# Patient Record
Sex: FEMALE | Race: WHITE
[De-identification: names, ages, dates, MRNs, and addresses within clinical notes are randomized per-mention and may not be internally consistent; named-entity substitution may affect disease eponyms.]

---

## 2017-02-27 ENCOUNTER — HOSPITAL ENCOUNTER (INPATIENT)
Dept: HOSPITAL 36 - ER | Age: 72
LOS: 3 days | Discharge: NURSING, REHAB OR LONG-TERM CARE | DRG: 872 | End: 2017-03-02
Attending: INTERNAL MEDICINE | Admitting: INTERNAL MEDICINE
Payer: MEDICARE

## 2017-02-27 DIAGNOSIS — E86.0: ICD-10-CM

## 2017-02-27 DIAGNOSIS — F03.90: ICD-10-CM

## 2017-02-27 DIAGNOSIS — F29: ICD-10-CM

## 2017-02-27 DIAGNOSIS — A41.9: Primary | ICD-10-CM

## 2017-02-27 DIAGNOSIS — N39.0: ICD-10-CM

## 2017-02-27 DIAGNOSIS — R65.20: ICD-10-CM

## 2017-02-27 DIAGNOSIS — F20.9: ICD-10-CM

## 2017-02-27 DIAGNOSIS — R31.9: ICD-10-CM

## 2017-02-27 LAB
ALBUMIN/GLOB SERPL: 1 {RATIO} (ref 1–1.8)
ALP SERPL-CCNC: 64 U/L (ref 34–104)
ALT SERPL-CCNC: 5 U/L (ref 7–52)
ANION GAP SERPL CALC-SCNC: 6.5 MMOL/L (ref 7–16)
AST SERPL-CCNC: 16 U/L (ref 13–39)
BACTERIA #/AREA URNS HPF: (no result) /HPF
BASOPHILS NFR BLD AUTO: 0.2 % (ref 0–2)
BILIRUB SERPL-MCNC: 0.2 MG/DL (ref 0.3–1)
BILIRUB UR-MCNC: NEGATIVE MG/DL
BUN SERPL-MCNC: 26 MG/DL (ref 7–25)
BUN/CREAT SERPL: 28.9
CALCIUM SERPL-MCNC: 9.9 MG/DL (ref 8.6–10.3)
CHLORIDE SERPL-SCNC: 105 MEQ/L (ref 98–107)
CO2 SERPL-SCNC: 30.8 MEQ/L (ref 21–31)
COLOR UR: YELLOW
CREAT SERPL-MCNC: 0.9 MG/DL (ref 0.6–1.2)
EOSINOPHIL NFR BLD AUTO: 1.5 % (ref 0–5)
EPI CELLS URNS QL MICRO: (no result) /LPF
ERYTHROCYTE [DISTWIDTH] IN BLOOD BY AUTOMATED COUNT: 14 % (ref 11.5–20)
GLOBULIN SER-MCNC: 3.6 GM/DL
GLUCOSE SERPL-MCNC: 103 MG/DL (ref 70–105)
GLUCOSE UR STRIP-MCNC: NEGATIVE MG/DL
HCT VFR BLD CALC: 39.4 % (ref 35–45)
HGB BLD-MCNC: 13.1 GM/DL (ref 11.7–16.1)
INR PPP: 1.08 (ref 0.5–1.4)
KETONES UR STRIP-MCNC: NEGATIVE MG/DL
LYMPHOCYTES NFR BLD AUTO: 14.5 % (ref 20–50)
MCH RBC QN AUTO: 29.9 PG (ref 27–31)
MCHC RBC AUTO-ENTMCNC: 33.3 PG (ref 28–36)
MCV RBC AUTO: 89.8 FL (ref 81–100)
MONOCYTES NFR BLD AUTO: 8.9 % (ref 2–10)
NEUTROPHILS # BLD: 6.4 TH/CMM (ref 1.8–8)
NEUTROPHILS NFR BLD AUTO: 74.9 % (ref 40–80)
PH UR STRIP: 6 [PH]
PLATELET # BLD: 205 TH/CMM (ref 150–400)
PMV BLD AUTO: 10.2 FL
POTASSIUM SERPL-SCNC: 4.3 MEQ/L (ref 3.5–5.1)
PROT UR STRIP-MCNC: (no result) MG/DL
PROTHROMBIN TIME: 10.7 SECONDS (ref 9.5–11.5)
RBC # BLD AUTO: 4.38 MIL/CMM (ref 3.8–5.2)
RBC # UR STRIP: (no result) /UL
RBC #/AREA URNS HPF: (no result) /HPF (ref 0–5)
SODIUM SERPL-SCNC: 138 MEQ/L (ref 136–145)
UROBILINOGEN UR STRIP-ACNC: 0.2 E.U./DL (ref 0.2–1)
WBC # BLD AUTO: 8.5 TH/CMM (ref 4.8–10.8)
WBC #/AREA URNS HPF: (no result) /HPF (ref 0–5)

## 2017-02-27 PROCEDURE — Z7610: HCPCS

## 2017-02-27 RX ADMIN — DEXTROSE AND SODIUM CHLORIDE SCH MLS/HR: 5; .45 INJECTION, SOLUTION INTRAVENOUS at 21:57

## 2017-02-27 NOTE — ADMIT CRITERIA FORM
Admit Criteria Forms





- Admit Criteria


Diagnosis: 





                                  URINARY COMPLICATIONS





Clinical Indications for Inpatient Care 


                                                                    (Place 'X' 

for any and all applicable criteria):





Ongoing inpatient care may be indicated for urinary complications with ANY ONE 

of the following: 


[X]I.    Urinary tract infection requiring inpatient care as indicated by ANY 

ONE of the following(8)(19)(20):


         [X]a)  Severe symptoms (eg, high fever, severe pain)


         [ ]b)  Vomiting or dehydration requiring ongoing inpatient care


         [X]c)  IV antibiotic needs that cannot be managed at lower level of 

care 


         [ ]d)  Hemodynamic instability


         [ ]e)  Obstruction of collecting system by stone or tumor


[ ]II.   Urinary retention requiring drainage or surgery (3)(4)(5)(17)(18)


[ ]III.  Renal failure (Use Renal Failure  Criteria  for further information.)


[ ]IV. Oliguria(30)


[ ]V.  Post obstructive diuresis requiring close monitoring of urine output and 

intravenous compensation for excessive fluid losses(33)











Extended stay beyond goal length of stay for primary condition may be needed 

until ALL of the following are present(3)(4)(5)(8):


[ ]a)   Renal function (creatinine) at baseline, or daily decreases in 

creatinine consistent with renal function return


[ ]b)   Voiding adequately or with urinary catheter or percutaneous suprapubic 

tube and management regimen in place 


         that is performable at lower level of care.


[ ]c)   Urine output adequate


[ ]d)   Fever absent or resolving


[ ]e)   Infection absent or treatable at next level of care











The original [facility] content created by [employer] has been revised. 


The portions of the content which have been revised are identified through the 

use of italic text or in bold, and [facility] 


has neither reviewed nor approved the modified material. All other unmodified 

content is copyright  [facility]


     


Please see references footnoted in the original [facility] edition 

2016


Admit Criteria Met?: Yes

## 2017-02-28 VITALS — SYSTOLIC BLOOD PRESSURE: 161 MMHG | DIASTOLIC BLOOD PRESSURE: 73 MMHG

## 2017-02-28 LAB
ALBUMIN/GLOB SERPL: 1 {RATIO} (ref 1–1.8)
ALP SERPL-CCNC: 53 U/L (ref 34–104)
ALT SERPL-CCNC: 4 U/L (ref 7–52)
ANION GAP SERPL CALC-SCNC: 8.8 MMOL/L (ref 7–16)
AST SERPL-CCNC: 14 U/L (ref 13–39)
BILIRUB SERPL-MCNC: 0.3 MG/DL (ref 0.3–1)
BUN SERPL-MCNC: 23 MG/DL (ref 7–25)
BUN/CREAT SERPL: 28.8
CALCIUM SERPL-MCNC: 9 MG/DL (ref 8.6–10.3)
CHLORIDE SERPL-SCNC: 105 MEQ/L (ref 98–107)
CO2 SERPL-SCNC: 30.1 MEQ/L (ref 21–31)
CREAT SERPL-MCNC: 0.8 MG/DL (ref 0.6–1.2)
EOSINOPHIL NFR BLD: 3 % (ref 0–5)
ERYTHROCYTE [DISTWIDTH] IN BLOOD BY AUTOMATED COUNT: 13.5 % (ref 11.5–20)
GLOBULIN SER-MCNC: 3.2 GM/DL
GLUCOSE SERPL-MCNC: 92 MG/DL (ref 70–105)
HCT VFR BLD CALC: 35.8 % (ref 35–45)
HGB BLD-MCNC: 12.3 GM/DL (ref 11.7–16.1)
MCH RBC QN AUTO: 30.2 PG (ref 27–31)
MCHC RBC AUTO-ENTMCNC: 34.4 PG (ref 28–36)
MCV RBC AUTO: 87.7 FL (ref 81–100)
NEUTROPHILS NFR BLD AUTO: 72 % (ref 40–80)
NEUTS BAND NFR BLD: 2 % (ref 0–10)
PLAT MORPH BLD: (no result)
PLATELET # BLD EST: ADEQUATE 10*3/UL
PLATELET # BLD: 186 TH/CMM (ref 150–400)
PMV BLD AUTO: 10.7 FL
POTASSIUM SERPL-SCNC: 3.9 MEQ/L (ref 3.5–5.1)
RBC # BLD AUTO: 4.08 MIL/CMM (ref 3.8–5.2)
RBC MORPH BLD: NORMAL
SODIUM SERPL-SCNC: 140 MEQ/L (ref 136–145)
TOTAL CELLS COUNTED BLD: 100
WBC # BLD AUTO: 7.4 TH/CMM (ref 4.8–10.8)

## 2017-02-28 RX ADMIN — SODIUM CHLORIDE SCH MLS/HR: 9 INJECTION, SOLUTION INTRAVENOUS at 17:05

## 2017-02-28 RX ADMIN — SULFAMETHOXAZOLE AND TRIMETHOPRIM SCH TAB: 800; 160 TABLET ORAL at 17:01

## 2017-02-28 RX ADMIN — DEXTROSE AND SODIUM CHLORIDE SCH MLS/HR: 5; .45 INJECTION, SOLUTION INTRAVENOUS at 11:09

## 2017-02-28 RX ADMIN — SULFAMETHOXAZOLE AND TRIMETHOPRIM SCH: 800; 160 TABLET ORAL at 14:07

## 2017-03-01 RX ADMIN — ANORECTAL OINTMENT SCH APPL: 15.7; .44; 24; 20.6 OINTMENT TOPICAL at 16:33

## 2017-03-01 RX ADMIN — SULFAMETHOXAZOLE AND TRIMETHOPRIM SCH TAB: 800; 160 TABLET ORAL at 16:33

## 2017-03-01 RX ADMIN — SULFAMETHOXAZOLE AND TRIMETHOPRIM SCH TAB: 800; 160 TABLET ORAL at 09:20

## 2017-03-01 RX ADMIN — SODIUM CHLORIDE SCH MLS/HR: 9 INJECTION, SOLUTION INTRAVENOUS at 16:39

## 2017-03-01 RX ADMIN — ANORECTAL OINTMENT SCH APPL: 15.7; .44; 24; 20.6 OINTMENT TOPICAL at 09:21

## 2017-03-01 RX ADMIN — DEXTROSE AND SODIUM CHLORIDE SCH MLS/HR: 5; .45 INJECTION, SOLUTION INTRAVENOUS at 19:51

## 2017-03-02 ENCOUNTER — HOSPITAL ENCOUNTER (INPATIENT)
Dept: HOSPITAL 36 - GERO | Age: 72
LOS: 8 days | Discharge: NURSING, REHAB OR LONG-TERM CARE | DRG: 885 | End: 2017-03-10
Attending: PSYCHIATRY & NEUROLOGY | Admitting: PSYCHIATRY & NEUROLOGY
Payer: MEDICARE

## 2017-03-02 VITALS — SYSTOLIC BLOOD PRESSURE: 128 MMHG | DIASTOLIC BLOOD PRESSURE: 70 MMHG

## 2017-03-02 DIAGNOSIS — F03.90: ICD-10-CM

## 2017-03-02 DIAGNOSIS — E44.1: ICD-10-CM

## 2017-03-02 DIAGNOSIS — I10: ICD-10-CM

## 2017-03-02 DIAGNOSIS — Z91.14: ICD-10-CM

## 2017-03-02 DIAGNOSIS — J45.909: ICD-10-CM

## 2017-03-02 DIAGNOSIS — N39.0: ICD-10-CM

## 2017-03-02 DIAGNOSIS — I25.10: ICD-10-CM

## 2017-03-02 DIAGNOSIS — M19.90: ICD-10-CM

## 2017-03-02 DIAGNOSIS — E66.9: ICD-10-CM

## 2017-03-02 DIAGNOSIS — G82.20: ICD-10-CM

## 2017-03-02 DIAGNOSIS — F29: Primary | ICD-10-CM

## 2017-03-02 DIAGNOSIS — E78.5: ICD-10-CM

## 2017-03-02 DIAGNOSIS — E03.9: ICD-10-CM

## 2017-03-02 DIAGNOSIS — F31.9: ICD-10-CM

## 2017-03-02 DIAGNOSIS — F20.9: ICD-10-CM

## 2017-03-02 LAB
ALBUMIN/GLOB SERPL: 0.9 {RATIO} (ref 1–1.8)
ALP SERPL-CCNC: 60 U/L (ref 34–104)
ALT SERPL-CCNC: 6 U/L (ref 7–52)
ANION GAP SERPL CALC-SCNC: 8.5 MMOL/L (ref 7–16)
AST SERPL-CCNC: 17 U/L (ref 13–39)
BILIRUB SERPL-MCNC: 0.3 MG/DL (ref 0.3–1)
BUN SERPL-MCNC: 14 MG/DL (ref 7–25)
BUN/CREAT SERPL: 12.7
CALCIUM SERPL-MCNC: 9.6 MG/DL (ref 8.6–10.3)
CHLORIDE SERPL-SCNC: 106 MEQ/L (ref 98–107)
CO2 SERPL-SCNC: 30.7 MEQ/L (ref 21–31)
CREAT SERPL-MCNC: 1.1 MG/DL (ref 0.6–1.2)
EOSINOPHIL NFR BLD: 3 % (ref 0–5)
ERYTHROCYTE [DISTWIDTH] IN BLOOD BY AUTOMATED COUNT: 13.3 % (ref 11.5–20)
GLOBULIN SER-MCNC: 3.7 GM/DL
GLUCOSE SERPL-MCNC: 95 MG/DL (ref 70–105)
HCT VFR BLD CALC: 38.4 % (ref 35–45)
HGB BLD-MCNC: 12.8 GM/DL (ref 11.7–16.1)
MCH RBC QN AUTO: 29.7 PG (ref 27–31)
MCHC RBC AUTO-ENTMCNC: 33.4 PG (ref 28–36)
MCV RBC AUTO: 89.1 FL (ref 81–100)
NEUTROPHILS NFR BLD AUTO: 70 % (ref 40–80)
NEUTS BAND NFR BLD: 2 % (ref 0–10)
PLAT MORPH BLD: NORMAL
PLATELET # BLD EST: ADEQUATE 10*3/UL
PLATELET # BLD: 209 TH/CMM (ref 150–400)
PMV BLD AUTO: 10.5 FL
POTASSIUM SERPL-SCNC: 4.2 MEQ/L (ref 3.5–5.1)
RBC # BLD AUTO: 4.31 MIL/CMM (ref 3.8–5.2)
RBC MORPH BLD: NORMAL
SODIUM SERPL-SCNC: 141 MEQ/L (ref 136–145)
TOTAL CELLS COUNTED BLD: 100
WBC # BLD AUTO: 7.5 TH/CMM (ref 4.8–10.8)

## 2017-03-02 PROCEDURE — Z7610: HCPCS

## 2017-03-02 PROCEDURE — X3401: HCPCS

## 2017-03-02 RX ADMIN — ALBUTEROL SULFATE SCH MG: 2 SYRUP ORAL at 18:00

## 2017-03-02 RX ADMIN — ANORECTAL OINTMENT SCH APPL: 15.7; .44; 24; 20.6 OINTMENT TOPICAL at 18:09

## 2017-03-02 RX ADMIN — SULFAMETHOXAZOLE AND TRIMETHOPRIM SCH TAB: 800; 160 TABLET ORAL at 18:01

## 2017-03-02 RX ADMIN — SULFAMETHOXAZOLE AND TRIMETHOPRIM SCH TAB: 800; 160 TABLET ORAL at 08:26

## 2017-03-02 RX ADMIN — ANORECTAL OINTMENT SCH APPL: 15.7; .44; 24; 20.6 OINTMENT TOPICAL at 08:26

## 2017-03-02 NOTE — PROGRESS NOTES
SUBJECTIVE:  Chart was reviewed and the patient interviewed.  Also, discussed

the patient's condition with the staff and reviewed records and labs.  The

patient is still anxious, but mood is not depressed ____.  The patient also is

cooperative with her treatment and compliant with taking care of medications. 

The patient also denies any intention to harm herself or others.  She still

seems to be responding to the stimuli, but no behavioral issues.



ASSESSMENT:  The patient is still psychotic, but no major behavioral issues.



TREATMENT PLAN:  Continue to monitor her behavior and her condition closely. 

Also, continue Haldol on a p.r.n. basis.  Also, continue Depakote and we will

get a Depakote blood level.  Also, Risperdal 1 mg twice a day to continue. 

Also, Depakote blood level that was done upon admission came back to be 78.8

which is within therapeutic level and we will continue same dose.  Also,

continue supportive therapy.





DD: 03/01/2017 09:44

DT: 03/02/2017 00:03

JOB# [id number]  [id number]

## 2017-03-03 RX ADMIN — ANORECTAL OINTMENT SCH APPL: 15.7; .44; 24; 20.6 OINTMENT TOPICAL at 09:22

## 2017-03-03 RX ADMIN — ALBUTEROL SULFATE SCH MG: 2 SYRUP ORAL at 09:23

## 2017-03-03 RX ADMIN — SULFAMETHOXAZOLE AND TRIMETHOPRIM SCH TAB: 800; 160 TABLET ORAL at 17:33

## 2017-03-03 RX ADMIN — ALBUTEROL SULFATE SCH MG: 2 SYRUP ORAL at 17:33

## 2017-03-03 RX ADMIN — SULFAMETHOXAZOLE AND TRIMETHOPRIM SCH TAB: 800; 160 TABLET ORAL at 09:22

## 2017-03-03 RX ADMIN — ANORECTAL OINTMENT SCH APPL: 15.7; .44; 24; 20.6 OINTMENT TOPICAL at 17:33

## 2017-03-03 NOTE — HISTORY & PHYSICAL
This is a cross coverage for Dr. Torres.  I did see the patient __3-2-17__ was 
waiting

for Dr. Torres to dictate the H and P, but apparently it was not done.



CHIEF COMPLAINT:  Increasing agitation, ALOC.



HISTORY OF PRESENT ILLNESS:  This is a 71 years old female with history of

dementia, hypothyroidism, obesity, asthma and DJD, was admitted from nursing

facility secondary to change in mental status.  The patient was evaluated by Dr. Cardoza in the ER and was diagnosed with urinary tract infection.



PAST MEDICAL HISTORY:  As mentioned in history of present illness.



PAST SURGICAL HISTORY:  No recent surgeries.



ALLERGIES:  No known drug allergies.



MEDICATIONS:  Albuterol, ascorbic acid, Plavix, digoxin, Haldol, midodrine,

____, Bactrim and ____.



FAMILY HISTORY:  Noncontributory.



SOCIAL HISTORY:  The patient lives in a nursing home.  The patient requiring

24-hour total care.



REVIEW OF SYSTEMS:  This is limited secondary to the patient's current mental

state.  We will try to obtain a more detailed review of systems ____ family

members.  There is a brother ____ Gregoria, number is [phone].  The

patient's regular physician is Dr. Royal Torres, [phone].  We also got

some information from Dr. Lopez who has followed the patient at ____ Nursing

Facility.



PHYSICAL EXAMINATION:

VITAL SIGNS:  Blood pressure 93/50, respirations 18, pulse 89 and temperature is

97.5.

GENERAL:  Elderly female, appears her stated age.

NECK:  Supple.  No mass.

LUNGS:  Equal breath sounds, few rhonchi.

HEART:  Regular rate and rhythm without appreciable murmurs.

ABDOMEN:  Soft, nontender and globular.

EXTREMITIES:  Positive excoriation.

NEUROLOGIC:  limited.



LABORATORY DATA:  WBC 7.5, hemoglobin 12.8 and platelets 209.  Sodium 141 and

potassium 4.2.  BUN 14 and creatinine 1.1.  Albumin 3.4.   wbc's.



ASSESSMENT AND PLAN:  UTI, obesity, dementia, hypothyroidism and mild

protein-calorie malnutrition.  We will continue the patient on IV antibiotic. 

Psychiatry has been consulted.  Continue IV hydration.  We will review the

patient's medication.  We will continue and follow the patient closely.





DD: 03/03/2017 16:30

DT: 03/03/2017 19:32

[facility]# [id number]  [id number]

MTDRY

## 2017-03-03 NOTE — PSYCHOSOCIAL EVALUATION
PATIENT'S AGE:  71.



SEX:  Female.



CHIEF COMPLAINT:  Agitation and irritability.



HISTORY OF PRESENT ILLNESS:  The patient is a 71-year-old female who was

transferred from Med/Surg Unit because of agitation and irritability.  The

patient has been extremely agitated and irritable and pulling IVs and tried to

hit staff with yelling and screaming episodes and has been uncooperative with

her treatment.  The patient also has been confused and has not been able to make

any decisions in regard to her treatment.  The patient was transferred from

Med/Surg Unit in order to adjust her psychotropic medications.



The patient is still agitated and irritable.  The patient also still has

episodes of yelling and screaming and she is unable to follow any directions and

she seems to be confused.  She also needs complete help with her ADLs.



PAST PSYCHIATRIC HISTORY:  The patient has history of dementia and history of

psychosis.



PAST MEDICAL HISTORY:  As per Dr. Martínez.



SOCIAL HISTORY:  No known alcohol or street drug use.



MENTAL STATUS EXAMINATION:  The patient appears older than her stated age. 

Irritable mood.  Disheveled.  Gets angry and agitated easily.  Thought processes

are with poverty of speech and episodes of yelling during interview.  The

patient did not answer questions regarding hallucinations or delusions, but

actively responding to internal stimuli.  The patient did not answer questions

regarding suicide or homicide.  The patient is alert, but seems to be

disoriented to time, place, person and situation.  Impaired immediate, recent

and remote memories and she did not know even her birth date.  Poor insight

because of her confusion.  Poor judgment and she is yelling and trying to hit

staff.  Poor attention and concentration.



ASSESSMENT:

PRIMARY DIAGNOSIS:  Unspecified psychosis.



SECONDARY DIAGNOSIS:  Dementia, moderate-to-severe, with psychotic features.



TREATMENT PLAN:  We will monitor her behavior and her condition closely.  We

will start individual as well as milieu psychotherapy.  Also, we will monitor

and adjust the psychotropic medications.



ESTIMATED LENGTH OF STAY:  7-10 days.



PATIENT's STRENGTHS AND WEAKNESSES:  The patient's strengths are not clear at

this time except that she might have supportive family.  Weaknesses are her poor

impulse control and confusion.



AFTER DISCHARGE PLAN:  The patient might need placement and outpatient treatment

and followup will continue as an outpatient.



CRITERIA FOR DISCHARGE:  The patient will not be psychotic and will stabilize on

psychotropic medications and will establish treatment plans.





DD: 03/03/2017 06:16

DT: 03/03/2017 09:03

[facility]# [id number]  [id number]

## 2017-03-03 NOTE — INTERNAL MEDICINE PROG NOTE
Internal Medicine Subjective





- Subjective


Service Date: 03/03/17 (awake, agitated nad)


Patient seen and examined:: with staff


Patient is:: awake


Per staff patient is:: no adverse event





Internal Medicine Objective





- Physical Exam


Vitals and I&O: 


 Vital Signs











Temp  97.5 F   03/03/17 06:57


 


Pulse  89   03/03/17 09:24


 


Resp  18   03/03/17 06:57


 


BP  93/53   03/03/17 06:57


 


Pulse Ox  97   03/03/17 06:57








 Intake & Output











 03/02/17 03/03/17 03/03/17





 18:59 06:59 18:59


 


Intake Total 300  


 


Output Total 100  


 


Balance 200  


 


Intake:   


 


  Oral 300  


 


Output:   


 


  Urine 100  


 


Other:   


 


  # Voids  2 











Active Medications: 


Current Medications





Albuterol Sulfate (Ventolin)  2 mg PO BID NOREEN


   Stop: 05/01/17 16:59


   Last Admin: 03/03/17 09:23 Dose:  2 mg


Ascorbic Acid (Vitamin C)  500 mg PO DAILY NOREEN


   Stop: 05/02/17 08:59


   Last Admin: 03/03/17 09:21 Dose:  500 mg


Betamethasone Dipropionate (Betamethasone Dip 0.05% Cream)  1 appl TP DAILY NOREEN


   Stop: 05/02/17 08:59


   Last Admin: 03/03/17 09:23 Dose:  1 appl


Calamine/Phenol (Calmoseptine)  1 appl TP BID NOREEN


   Stop: 05/01/17 16:59


   Last Admin: 03/03/17 09:22 Dose:  1 appl


Ciprofloxacin (Cipro)  500 mg PO BID NOREEN


   Stop: 05/01/17 16:59


   Last Admin: 03/03/17 09:23 Dose:  500 mg


Ciprofloxacin (Cipro 0.3% Ophth Soln)  1 drop EACH EYE BID NOREEN


   Stop: 05/01/17 16:59


   Last Admin: 03/03/17 09:19 Dose:  1 drop


Clopidogrel Bisulfate (Plavix)  75 mg PO DAILY NOREEN


   Stop: 05/02/17 08:59


   Last Admin: 03/03/17 09:21 Dose:  75 mg


Clotrimazole (Lotrimin 1% Cream)  1 appl TP DAILY NOREEN


   Stop: 05/02/17 08:59


   Last Admin: 03/03/17 09:23 Dose:  1 appl


Digoxin (Lanoxin)  0.125 mg PO DAILY NOREEN


   Stop: 05/02/17 08:59


   Last Admin: 03/03/17 09:24 Dose:  Not Given


Divalproex Sodium (Depakote Er)  500 mg PO BID@0900,2100 NOREEN


   PRN Reason: Protocol


   Stop: 05/01/17 20:59


   Last Admin: 03/03/17 09:22 Dose:  500 mg


Donepezil HCl (Aricept)  5 mg PO HS NOREEN


   Stop: 05/01/17 20:59


   Last Admin: 03/02/17 21:06 Dose:  5 mg


Haloperidol (Haldol)  1 mg PO BID PRN; Protocol


   PRN Reason: Agitation


   Stop: 05/01/17 15:48


   Last Admin: 03/03/17 14:47 Dose:  1 mg


Lorazepam (Ativan)  0.5 mg PO Q4H PRN


   PRN Reason: Anxiety


   Stop: 05/02/17 15:11


   Last Admin: 03/03/17 15:25 Dose:  0.5 mg


Midodrine (Proamatine)  5 mg PO Q12HR NOREEN


   Stop: 05/01/17 20:59


   Last Admin: 03/03/17 09:19 Dose:  5 mg


Multivitamins/Vitamin C (Theragran)  1 tab PO DAILY NOREEN


   Stop: 05/02/17 08:59


   Last Admin: 03/03/17 09:22 Dose:  1 tab


Promethazine HCl/Dextromethorphan (Phenergan Dm 6.25/15mg-5 Ml)  5 ml PO TID PRN


   PRN Reason: CONGESTION/COUGH


   Stop: 05/01/17 15:53


Quetiapine Fumarate (Seroquel)  12.5 mg PO TID NOREEN


   PRN Reason: Protocol


   Stop: 05/01/17 20:59


   Last Admin: 03/03/17 14:48 Dose:  12.5 mg


Quetiapine Fumarate (Seroquel)  50 mg PO HS NOREEN


   PRN Reason: Protocol


   Stop: 05/01/17 20:59


   Last Admin: 03/02/17 21:05 Dose:  50 mg


Trimethoprim/Sulfamethoxazole (Bactrim Ds)  1 tab PO BID NOREEN


   Stop: 05/01/17 16:59


   Last Admin: 03/03/17 09:22 Dose:  1 tab


Zinc Sulfate (Zinc Sulfate)  220 mg PO DAILY NOREEN


   Stop: 05/02/17 08:59


   Last Admin: 03/03/17 09:23 Dose:  220 mg








General: alert


HEENT: NC/AT, PERRLA


Neck: Supple


Lungs: CTAB


Cardiovascular: RRR, Normal S1, Normal S2, without murmur


Abdomen: soft non-tender, non-distended


Extremities: clear





- Procedures


Procedures: 


 Procedures











Procedure Code Date


 


CARDIOPULM RESUSCITA NOS 99.60 10/19/07


 


CL REDUC DISLOC-HAND/FNG 79.74 08/29/07


 


CLOSURE SKIN & SUBCUTANEOUS NEC 86.59 09/10/12


 


CONTINUOUS INVASIVE MECHANICAL VENTILATION <96 CONSEC HRS 96.71 10/19/07


 


DESTRUCT EXT EAR LES NEC 18.29 06/27/01


 


DIPHTHERIA TOXOID ADMIN 99.36 02/03/12


 


EGD BIOPSY SINGLE/MULTIPLE 33809 06/16/10


 


EGD CONTROL BLEEDING ANY 42556 09/03/06


 


ELECTROCARDIOGRAM 89.52 06/27/01


 


ELECTROCARDIOGRAM COMPLETE 94077 06/27/01


 


EMERGENCY DEPT VISIT 48815 02/23/12


 


EMERGENCY DEPT VISIT 65305 02/03/12


 


ENDOSCOP EXC OR DEST OF LESION OR TISSUE OF ESOPHA 42.33 09/03/06


 


ESOPHAGOGASTRODUODENOSCOPY [EGD] W/CLOSED BIOPSY 45.16 06/16/10


 


EXC FACE-MM B9+ALBERTO 2.1-3 CM 38405 06/27/01


 


FREEING OF BOWEL ADHESION 96804 06/19/08


 


HEART/LUNG RESUSCITATION CPR 51455 10/19/07


 


HERNIA REPAIR W/MESH 95038 02/23/12


 


IMMUNIZATION ADMIN 70126 02/03/12


 


INJECT/INFUSE NEC 99.29 04/18/13


 


INSERT EMERGENCY AIRWAY 17909 10/19/07


 


INSERT ENDOTRACHEAL TUBE 96.04 10/19/07


 


INSERT PICC CATH 05664 05/28/16


 


INSERTION OF INFUSION DEV INTO R SUBCLAV VEIN, PERC APPROACH [id number] 05/28/16


 


OP RED-INT FIX TIB/FIBUL 79.36 11/24/98


 


OTH LYSIS-PERITONEAL ADHES 54.59 02/23/12


 


OTHER C.A.T. SCAN 88.38 11/03/98


 


OTHER OPEN INCISIONAL HERNIA REPAIR WITH GRAFT OR PROSTHESIS 53.61 02/23/12


 


PART SM BOWEL RESECT NEC 45.62 06/19/08


 


PERMANENT ILEOSTOMY NEC 46.23 10/19/07


 


RADIOGRAPHIC PROCEDURE 14245 11/03/98


 


REMOVAL OF COLON 76189 10/19/07


 


REMOVAL OF SPLEEN TOTAL 80770 10/19/07


 


REPAIR BOWEL OPENING 80376 06/19/08


 


RPR S/N/AX/GEN/TRNK 2.5CM/< 46584 02/23/12


 


RPR S/N/AX/GEN/TRNK2.6-7.5CM 08991 09/10/12


 


RPR VENTRAL ALLISON INIT REDUC 03624 02/23/12


 


SM BOWEL STOMA CLOSURE 46.51 06/19/08


 


TD VACCINE > 7 IM 35680 02/03/12


 


TETANUS TOXOID ADMINIST 99.38 02/03/12


 


THER/PROPH/DIAG INJ IV PUSH 24312 04/18/13


 


TOT INTRA-ABD COLECTOMY 45.8 10/19/07


 


TOTAL SPLENECTOMY 41.5 10/19/07


 


TREAT FINGER DISLOCATION 82344 08/29/07


 


TREATMENT OF ANKLE FRACTURE 78477 11/24/98


 


ULTRASONOGRAPHY OF RIGHT SUBCLAVIAN VEIN, GUIDANCE [id number] 05/28/16


 


VENOUS CATHETERIZATION NEC 38.93 09/03/06














Internal Medicine Assmt/Plan





- Assessment


Assessment: 





paraplegia


bipolar with schizophrenia


artherosclerotic heart disease


htn


hyperlipidemia








- Plan


Plan: 





swallow eval to be done


aspiration precautions


continue snf meds


cpm

## 2017-03-04 RX ADMIN — ALBUTEROL SULFATE SCH MG: 2 SYRUP ORAL at 17:19

## 2017-03-04 RX ADMIN — SULFAMETHOXAZOLE AND TRIMETHOPRIM SCH TAB: 800; 160 TABLET ORAL at 17:19

## 2017-03-04 RX ADMIN — ALBUTEROL SULFATE SCH MG: 2 SYRUP ORAL at 10:00

## 2017-03-04 RX ADMIN — ANORECTAL OINTMENT SCH APPL: 15.7; .44; 24; 20.6 OINTMENT TOPICAL at 10:00

## 2017-03-04 RX ADMIN — SULFAMETHOXAZOLE AND TRIMETHOPRIM SCH TAB: 800; 160 TABLET ORAL at 10:00

## 2017-03-04 RX ADMIN — ANORECTAL OINTMENT SCH APPL: 15.7; .44; 24; 20.6 OINTMENT TOPICAL at 17:19

## 2017-03-04 NOTE — INTERNAL MEDICINE PROG NOTE
Internal Medicine Subjective





- Subjective


Patient seen and examined:: with staff, chart reviewed


Patient is:: awake, interactive


Per staff patient is:: no adverse event, agitated, combative, confused





Internal Medicine Objective





- Physical Exam


Vitals and I&O: 


 Vital Signs











Temp  97.9 F   03/04/17 15:51


 


Pulse  101   03/04/17 15:51


 


Resp  22   03/04/17 15:51


 


BP  113/67   03/04/17 15:51


 


Pulse Ox  98   03/04/17 15:51








 Intake & Output











 03/03/17 03/04/17 03/04/17





 18:59 06:59 18:59


 


Intake Total 1000  


 


Output Total 460  


 


Balance 540  


 


Intake:   


 


  Oral 1000  


 


Output:   


 


  Urine 460  


 


Other:   


 


  # Voids   2


 


  # Bowel Movements 2  











Active Medications: 


Current Medications





Albuterol Sulfate (Ventolin)  2 mg PO BID NOREEN


   Stop: 05/01/17 16:59


   Last Admin: 03/04/17 10:00 Dose:  2 mg


Ascorbic Acid (Vitamin C)  500 mg PO DAILY NOREEN


   Stop: 05/02/17 08:59


   Last Admin: 03/04/17 10:00 Dose:  500 mg


Betamethasone Dipropionate (Betamethasone Dip 0.05% Cream)  1 appl TP DAILY NOREEN


   Stop: 05/02/17 08:59


   Last Admin: 03/04/17 10:00 Dose:  1 appl


Calamine/Phenol (Calmoseptine)  1 appl TP BID NOREEN


   Stop: 05/01/17 16:59


   Last Admin: 03/04/17 10:00 Dose:  1 appl


Ciprofloxacin (Cipro 0.3% Ophth Soln)  1 drop EACH EYE BID NOREEN


   Stop: 05/01/17 16:59


   Last Admin: 03/04/17 10:00 Dose:  1 drop


Clopidogrel Bisulfate (Plavix)  75 mg PO DAILY NOREEN


   Stop: 05/02/17 08:59


   Last Admin: 03/04/17 10:00 Dose:  75 mg


Clotrimazole (Lotrimin 1% Cream)  1 appl TP DAILY NOREEN


   Stop: 05/02/17 08:59


   Last Admin: 03/04/17 10:00 Dose:  1 appl


Digoxin (Lanoxin)  0.125 mg PO DAILY NOREEN


   Stop: 05/02/17 08:59


   Last Admin: 03/04/17 10:13 Dose:  Not Given


Divalproex Sodium (Depakote Er)  500 mg PO BID@0900,2100 NOREEN


   PRN Reason: Protocol


   Stop: 05/01/17 20:59


   Last Admin: 03/04/17 10:00 Dose:  500 mg


Donepezil HCl (Aricept)  5 mg PO HS NOREEN


   Stop: 05/01/17 20:59


   Last Admin: 03/03/17 21:10 Dose:  5 mg


Haloperidol (Haldol)  1 mg PO BID PRN; Protocol


   PRN Reason: Agitation


   Stop: 05/01/17 15:48


   Last Admin: 03/03/17 14:47 Dose:  1 mg


Lorazepam (Ativan)  0.5 mg PO Q4H PRN


   PRN Reason: Anxiety


   Stop: 05/02/17 15:11


   Last Admin: 03/04/17 10:21 Dose:  0.5 mg


Midodrine (Proamatine)  5 mg PO Q12HR NOREEN


   Stop: 05/01/17 20:59


   Last Admin: 03/04/17 10:00 Dose:  5 mg


Multivitamins/Vitamin C (Theragran)  1 tab PO DAILY NOREEN


   Stop: 05/02/17 08:59


   Last Admin: 03/04/17 10:00 Dose:  1 tab


Promethazine HCl/Dextromethorphan (Phenergan Dm 6.25/15mg-5 Ml)  5 ml PO TID PRN


   PRN Reason: CONGESTION/COUGH


   Stop: 05/01/17 15:53


Quetiapine Fumarate (Seroquel)  12.5 mg PO TID NOREEN


   PRN Reason: Protocol


   Stop: 05/01/17 20:59


   Last Admin: 03/04/17 13:20 Dose:  12.5 mg


Quetiapine Fumarate (Seroquel)  50 mg PO HS [facility]


   PRN Reason: Protocol


   Stop: 05/01/17 20:59


   Last Admin: 03/03/17 21:11 Dose:  50 mg


Trimethoprim/Sulfamethoxazole (Bactrim Ds)  1 tab PO BID [facility]


   Stop: 05/01/17 16:59


   Last Admin: 03/04/17 10:00 Dose:  1 tab


Zinc Sulfate (Zinc Sulfate)  220 mg PO DAILY [facility]


   Stop: 05/02/17 08:59


   Last Admin: 03/04/17 10:00 Dose:  220 mg








General: lethargic, demented


HEENT: NC/AT, PERRLA


Neck: Supple, No JVD


Lungs: CTAB


Cardiovascular: RRR, Normal S1, Normal S2


Abdomen: soft non-tender


Extremities: excoriation


Neurological: no change, disorganized, unsteady, unable to follow command





- Procedures


Procedures: 


 Procedures











Procedure Code Date


 


CARDIOPULM RESUSCITA NOS 99.60 10/19/07


 


CL REDUC DISLOC-HAND/FNG 79.74 08/29/07


 


CLOSURE SKIN & SUBCUTANEOUS NEC 86.59 09/10/12


 


CONTINUOUS INVASIVE MECHANICAL VENTILATION <96 CONSEC HRS 96.71 10/19/07


 


DESTRUCT EXT EAR LES NEC 18.29 06/27/01


 


DIPHTHERIA TOXOID ADMIN 99.36 02/03/12


 


EGD BIOPSY SINGLE/MULTIPLE 82954 06/16/10


 


EGD CONTROL BLEEDING ANY 71862 09/03/06


 


ELECTROCARDIOGRAM 89.52 06/27/01


 


ELECTROCARDIOGRAM COMPLETE 65626 06/27/01


 


EMERGENCY DEPT VISIT 96881 02/23/12


 


EMERGENCY DEPT VISIT 01634 02/03/12


 


ENDOSCOP EXC OR DEST OF LESION OR TISSUE OF ESOPHA 42.33 09/03/06


 


ESOPHAGOGASTRODUODENOSCOPY [EGD] W/CLOSED BIOPSY 45.16 06/16/10


 


EXC FACE-MM B9+ALBERTO 2.1-3 CM 74748 06/27/01


 


FREEING OF BOWEL ADHESION 61340 06/19/08


 


HEART/LUNG RESUSCITATION CPR 13760 10/19/07


 


HERNIA REPAIR W/MESH 89646 02/23/12


 


IMMUNIZATION ADMIN 75979 02/03/12


 


INJECT/INFUSE NEC 99.29 04/18/13


 


INSERT EMERGENCY AIRWAY 34740 10/19/07


 


INSERT ENDOTRACHEAL TUBE 96.04 10/19/07


 


INSERT PICC CATH 65727 05/28/16


 


INSERTION OF INFUSION DEV INTO R SUBCLAV VEIN, PERC APPROACH [id number] 05/28/16


 


OP RED-INT FIX TIB/FIBUL 79.36 11/24/98


 


OTH LYSIS-PERITONEAL ADHES 54.59 02/23/12


 


OTHER C.A.T. SCAN 88.38 11/03/98


 


OTHER OPEN INCISIONAL HERNIA REPAIR WITH GRAFT OR PROSTHESIS 53.61 02/23/12


 


PART SM BOWEL RESECT NEC 45.62 06/19/08


 


PERMANENT ILEOSTOMY NEC 46.23 10/19/07


 


RADIOGRAPHIC PROCEDURE 14062 11/03/98


 


REMOVAL OF COLON 41329 10/19/07


 


REMOVAL OF SPLEEN TOTAL 42718 10/19/07


 


REPAIR BOWEL OPENING 15578 06/19/08


 


RPR S/N/AX/GEN/TRNK 2.5CM/< 98268 02/23/12


 


RPR S/N/AX/GEN/TRNK2.6-7.5CM 66052 09/10/12


 


RPR VENTRAL ALLISON INIT REDUC 55046 02/23/12


 


SM BOWEL STOMA CLOSURE 46.51 06/19/08


 


TD VACCINE > 7 IM 44446 02/03/12


 


TETANUS TOXOID ADMINIST 99.38 02/03/12


 


THER/PROPH/DIAG INJ IV PUSH 84392 04/18/13


 


TOT INTRA-ABD COLECTOMY 45.8 10/19/07


 


TOTAL SPLENECTOMY 41.5 10/19/07


 


TREAT FINGER DISLOCATION 85493 08/29/07


 


TREATMENT OF ANKLE FRACTURE 19883 11/24/98


 


ULTRASONOGRAPHY OF RIGHT SUBCLAVIAN VEIN, GUIDANCE [id number] 05/28/16


 


VENOUS CATHETERIZATION NEC 38.93 09/03/06














Internal Medicine Assmt/Plan





- Assessment


Assessment: 





paraplegia


bipolar with schizophrenia


artherosclerotic heart disease


htn


hyperlipidemia


uti





- Plan


Plan: 





cont on bp meds 


cont on po abx


aspiration precaution 


cont on low na diet 


kellie rn

## 2017-03-04 NOTE — PROGRESS NOTES
SUBJECTIVE:  The patient was seen, chart reviewed, and discussed with staff. 

The patient is currently in the hospital due to agitation and irritability. 

Trying to hit staff, uncooperative.  When I ask the patient any question, she

tells me "shut up."  She is refusing interview.  She is angry, irritable, and

appears confused, flat affect.



ASSESSMENT:  The patient is refusing interview, noted to be agitated, irritable,

poor impulse control, seems to be mumbling, history of dementia, and appears

confused and disoriented.



PLAN:  We will try to reestablish ____ at a later time.  We will continue to

monitor closely and make appropriate medication adjustments.  Given the current

symptoms and the severity of the events that brought her to the hospital, the

patient is not safe for a lower level of care.





DD: 03/04/2017 06:36

DT: 03/04/2017 20:30

JOB# [id number]  [id number]

## 2017-03-05 RX ADMIN — ANORECTAL OINTMENT SCH APPL: 15.7; .44; 24; 20.6 OINTMENT TOPICAL at 09:55

## 2017-03-05 RX ADMIN — ANORECTAL OINTMENT SCH APPL: 15.7; .44; 24; 20.6 OINTMENT TOPICAL at 17:02

## 2017-03-05 RX ADMIN — SULFAMETHOXAZOLE AND TRIMETHOPRIM SCH TAB: 800; 160 TABLET ORAL at 09:55

## 2017-03-05 RX ADMIN — SULFAMETHOXAZOLE AND TRIMETHOPRIM SCH TAB: 800; 160 TABLET ORAL at 17:02

## 2017-03-05 RX ADMIN — ALBUTEROL SULFATE SCH MG: 2 SYRUP ORAL at 09:55

## 2017-03-05 RX ADMIN — ALBUTEROL SULFATE SCH MG: 2 SYRUP ORAL at 17:02

## 2017-03-05 NOTE — PROGRESS NOTES
SUBJECTIVE:  The patient seen, chart reviewed, discussed with staff.  The

patient is currently in the hospital with agitation, irritability.  The patient

is refusing interview, not talking to me at all, anger, irritable, confused,

with flat affect, still aggressive, trying to hit staff, not safe for a lower

level of care.



ASSESSMENT:  The patient refusing interview, agitated, irritable, poor impulse

control, mumbling to herself, still aggressive.



PLAN:  We will continue to monitor and titrate medications.  The patient is not

safe for a lower level of care at this time.





DD: 03/05/2017 08:26

DT: 03/05/2017 18:42

JOB# [id number]  [id number]

## 2017-03-05 NOTE — INTERNAL MEDICINE PROG NOTE
Internal Medicine Subjective





- Subjective


Patient seen and examined:: with staff, chart reviewed


Patient is:: verbal, non-interactive


Per staff patient is:: agitated, noncompliant, confused





Internal Medicine Objective





- Physical Exam


Vitals and I&O: 


 Vital Signs











Temp  97.9 F   03/04/17 15:51


 


Pulse  81   03/05/17 09:55


 


Resp  22   03/04/17 15:51


 


BP  113/67   03/04/17 15:51


 


Pulse Ox  98   03/04/17 15:51








 Intake & Output











 03/04/17 03/05/17 03/05/17





 18:59 06:59 18:59


 


Intake Total 800  


 


Output Total 600  


 


Balance 200  


 


Intake:   


 


  Oral 800  


 


Output:   


 


  Urine 600  


 


Other:   


 


  # Voids 2  


 


  # Bowel Movements 2  


 


  Stool Characteristics  Soft 





  Liquid 





  Brown 











Active Medications: 


Current Medications





Albuterol Sulfate (Ventolin)  2 mg PO BID NOREEN


   Stop: 05/01/17 16:59


   Last Admin: 03/05/17 09:55 Dose:  2 mg


Ascorbic Acid (Vitamin C)  500 mg PO DAILY NOREEN


   Stop: 05/02/17 08:59


   Last Admin: 03/05/17 09:55 Dose:  500 mg


Betamethasone Dipropionate (Betamethasone Dip 0.05% Cream)  1 appl TP DAILY NOREEN


   Stop: 05/02/17 08:59


   Last Admin: 03/05/17 09:55 Dose:  1 appl


Calamine/Phenol (Calmoseptine)  1 appl TP BID NOREEN


   Stop: 05/01/17 16:59


   Last Admin: 03/05/17 09:55 Dose:  1 appl


Ciprofloxacin (Cipro 0.3% Ophth Soln)  1 drop EACH EYE BID NOREEN


   Stop: 05/01/17 16:59


   Last Admin: 03/05/17 09:55 Dose:  1 drop


Clopidogrel Bisulfate (Plavix)  75 mg PO DAILY NOREEN


   Stop: 05/02/17 08:59


   Last Admin: 03/05/17 09:55 Dose:  75 mg


Clotrimazole (Lotrimin 1% Cream)  1 appl TP DAILY NOREEN


   Stop: 05/02/17 08:59


   Last Admin: 03/05/17 09:55 Dose:  1 appl


Digoxin (Lanoxin)  0.125 mg PO DAILY NOREEN


   Stop: 05/02/17 08:59


   Last Admin: 03/05/17 09:55 Dose:  0.125 mg


Divalproex Sodium (Depakote Er)  500 mg PO BID@0900,2100 NOREEN


   PRN Reason: Protocol


   Stop: 05/01/17 20:59


   Last Admin: 03/05/17 09:55 Dose:  500 mg


Donepezil HCl (Aricept)  5 mg PO HS NOREEN


   Stop: 05/01/17 20:59


   Last Admin: 03/04/17 21:11 Dose:  5 mg


Haloperidol (Haldol)  1 mg PO BID PRN; Protocol


   PRN Reason: Agitation


   Stop: 05/01/17 15:48


   Last Admin: 03/03/17 14:47 Dose:  1 mg


Lorazepam (Ativan)  0.5 mg PO Q4H PRN


   PRN Reason: Anxiety


   Stop: 05/02/17 15:11


   Last Admin: 03/05/17 09:55 Dose:  0.5 mg


Midodrine (Proamatine)  5 mg PO Q12HR NOREEN


   Stop: 05/01/17 20:59


   Last Admin: 03/05/17 09:55 Dose:  5 mg


Multivitamins/Vitamin C (Theragran)  1 tab PO DAILY NOREEN


   Stop: 05/02/17 08:59


   Last Admin: 03/05/17 09:55 Dose:  1 tab


Promethazine HCl/Dextromethorphan (Phenergan Dm 6.25/15mg-5 Ml)  5 ml PO TID PRN


   PRN Reason: CONGESTION/COUGH


   Stop: 05/01/17 15:53


Quetiapine Fumarate (Seroquel)  12.5 mg PO TID NOREEN


   PRN Reason: Protocol


   Stop: 05/01/17 20:59


   Last Admin: 03/05/17 09:55 Dose:  12.5 mg


Quetiapine Fumarate (Seroquel)  50 mg PO HS NOREEN


   PRN Reason: Protocol


   Stop: 05/01/17 20:59


   Last Admin: 03/04/17 21:11 Dose:  50 mg


Trimethoprim/Sulfamethoxazole (Bactrim Ds)  1 tab PO BID [facility]


   Stop: 05/01/17 16:59


   Last Admin: 03/05/17 09:55 Dose:  1 tab


Zinc Sulfate (Zinc Sulfate)  220 mg PO DAILY NOREEN


   Stop: 05/02/17 08:59


   Last Admin: 03/05/17 09:55 Dose:  220 mg








General: demented


HEENT: NC/AT, PERRLA


Neck: Supple, No JVD


Lungs: CTAB


Cardiovascular: RRR, Normal S1, Normal S2


Abdomen: soft non-tender, globular, positive bowel sound


Extremities: excoriation


Neurological: no change, disorganized, unsteady





- Procedures


Procedures: 


 Procedures











Procedure Code Date


 


CARDIOPULM RESUSCITA NOS 99.60 10/19/07


 


CL REDUC DISLOC-HAND/FNG 79.74 08/29/07


 


CLOSURE SKIN & SUBCUTANEOUS NEC 86.59 09/10/12


 


CONTINUOUS INVASIVE MECHANICAL VENTILATION <96 CONSEC HRS 96.71 10/19/07


 


DESTRUCT EXT EAR LES NEC 18.29 06/27/01


 


DIPHTHERIA TOXOID ADMIN 99.36 02/03/12


 


EGD BIOPSY SINGLE/MULTIPLE 34046 06/16/10


 


EGD CONTROL BLEEDING ANY 19250 09/03/06


 


ELECTROCARDIOGRAM 89.52 06/27/01


 


ELECTROCARDIOGRAM COMPLETE 38879 06/27/01


 


EMERGENCY DEPT VISIT 10414 02/23/12


 


EMERGENCY DEPT VISIT 15559 02/03/12


 


ENDOSCOP EXC OR DEST OF LESION OR TISSUE OF ESOPHA 42.33 09/03/06


 


ESOPHAGOGASTRODUODENOSCOPY [EGD] W/CLOSED BIOPSY 45.16 06/16/10


 


EXC FACE-MM B9+ALBERTO 2.1-3 CM 48851 06/27/01


 


FREEING OF BOWEL ADHESION 61968 06/19/08


 


HEART/LUNG RESUSCITATION CPR 05287 10/19/07


 


HERNIA REPAIR W/MESH 67479 02/23/12


 


IMMUNIZATION ADMIN 19364 02/03/12


 


INJECT/INFUSE NEC 99.29 04/18/13


 


INSERT EMERGENCY AIRWAY 29745 10/19/07


 


INSERT ENDOTRACHEAL TUBE 96.04 10/19/07


 


INSERT PICC CATH 43071 05/28/16


 


INSERTION OF INFUSION DEV INTO R SUBCLAV VEIN, PERC APPROACH [id number] 05/28/16


 


OP RED-INT FIX TIB/FIBUL 79.36 11/24/98


 


OTH LYSIS-PERITONEAL ADHES 54.59 02/23/12


 


OTHER C.A.T. SCAN 88.38 11/03/98


 


OTHER OPEN INCISIONAL HERNIA REPAIR WITH GRAFT OR PROSTHESIS 53.61 02/23/12


 


PART SM BOWEL RESECT NEC 45.62 06/19/08


 


PERMANENT ILEOSTOMY NEC 46.23 10/19/07


 


RADIOGRAPHIC PROCEDURE 92974 11/03/98


 


REMOVAL OF COLON 39825 10/19/07


 


REMOVAL OF SPLEEN TOTAL 57604 10/19/07


 


REPAIR BOWEL OPENING 79768 06/19/08


 


RPR S/N/AX/GEN/TRNK 2.5CM/< 24123 02/23/12


 


RPR S/N/AX/GEN/TRNK2.6-7.5CM 82707 09/10/12


 


RPR VENTRAL ALLISON INIT REDUC 79813 02/23/12


 


SM BOWEL STOMA CLOSURE 46.51 06/19/08


 


TD VACCINE > 7 IM 06217 02/03/12


 


TETANUS TOXOID ADMINIST 99.38 02/03/12


 


THER/PROPH/DIAG INJ IV PUSH 91404 04/18/13


 


TOT INTRA-ABD COLECTOMY 45.8 10/19/07


 


TOTAL SPLENECTOMY 41.5 10/19/07


 


TREAT FINGER DISLOCATION 09121 08/29/07


 


TREATMENT OF ANKLE FRACTURE 37972 11/24/98


 


ULTRASONOGRAPHY OF RIGHT SUBCLAVIAN VEIN, GUIDANCE [id number] 05/28/16


 


VENOUS CATHETERIZATION NEC 38.93 09/03/06














Internal Medicine Assmt/Plan





- Assessment


Assessment: 





paraplegia


bipolar with schizophrenia


artherosclerotic heart disease


htn


hyperlipidemia


uti





- Plan


Plan: 





cont on bp meds 


cont on po abx


aspiration precaution 


cont on low na diet 


kellie rn

## 2017-03-06 RX ADMIN — ALBUTEROL SULFATE SCH MG: 2 SYRUP ORAL at 17:57

## 2017-03-06 RX ADMIN — ANORECTAL OINTMENT SCH APPL: 15.7; .44; 24; 20.6 OINTMENT TOPICAL at 08:35

## 2017-03-06 RX ADMIN — ALBUTEROL SULFATE SCH: 2 SYRUP ORAL at 13:43

## 2017-03-06 RX ADMIN — ANORECTAL OINTMENT SCH APPL: 15.7; .44; 24; 20.6 OINTMENT TOPICAL at 17:57

## 2017-03-06 RX ADMIN — SULFAMETHOXAZOLE AND TRIMETHOPRIM SCH TAB: 800; 160 TABLET ORAL at 08:35

## 2017-03-06 RX ADMIN — SULFAMETHOXAZOLE AND TRIMETHOPRIM SCH TAB: 800; 160 TABLET ORAL at 17:57

## 2017-03-06 NOTE — DISCHARGE SUMMARY
ADMITTING DIAGNOSES:  Urosepsis with possible early pneumonia.



HISTORY OF PRESENT ILLNESS:  This 71-year-old female of [place] extraction was

admitted from a [facility] facility because of her respiratory distress,

frequency and fever.  Evaluation at the Emergency Room at the [facility] revealed that she was early uroseptic and possible early

congestive heart failure, Cardiopulmonary and Infectious Disease consults had

been obtained.



PAST MEDICAL HISTORY:

1. Advanced dementia with bipolar and schizophrenia.

2. Atherosclerotic and hypertensive cardiovascular heart disease.

3. Chronic obstructive pulmonary disease.

4. Generalized degenerative joint disease with osteoarthritis and osteoporosis. 

The patient is wheelchair bound. 



FAMILY HISTORY:  ____ diseases.  She has had the usual childhood diseases, has

had her immunizations at the time they were needed.



ALLERGIES:  No known allergies.



SOCIAL HISTORY:  No history of smoking, alcohol or drug habituation.  No

medication list available.



REVIEW OF SYSTEMS:  Some confusion, disorientation, febrile, quite combative. 

Needs to be restrained.  She is alert, but not in touch with reality.



PHYSICAL EXAMINATION:

GENERAL:  The patient appears well nourished for her age.

VITAL SIGNS:  She is about 5 feet 8 inches, weighs about 185 pounds, pulse 80

and irregular, respirations 20-24, temperature 100, blood pressure is 150/90.

NEUROLOGIC:  Skull symmetrical.  No alopecia, no mass, no pulsations or bruit. 

SKIN:  With no dermatosis, no petechiae, ecchymosis, no jaundice or

discoloration.

EYES, EARS, NOSE AND THROAT:  The sclerae are all normal with atherosclerotic

change of the fundi.

NECK:  Supple.  Thyroid is not enlarged.  Trache is midline.  Bilateral palpable

carotids, no bruits.

CHEST:  Symmetrical.  Bilateral breasts are atrophic, but no mass, discoloration

or any areolar discharge.

LUNGS:  Show inspiratory and expiratory rhonchi and some wheezing and a few

basal rales.

HEART:  Regular heart rate and rhythm.  Cardiac ____ area of dullness, not

enlarged.  No murmur, gallops or arrhythmias.

ABDOMEN:  Benign, physiologic.  No organomegaly.  No rebound, guarding or

tenderness.  Very difficult to do an external genitalia, pelvic and rectal exam.

EXTREMITIES:  Shows 2+ pedal edema.  Diminished pulses.  Negative Homans sign.

NEUROLOGIC:  Essentially intact.  The patient is wheelchair bound.



PLAN:  Admit the patient with Cardiopulmonary and Infectious Disease consults

and I will follow them accordingly.





DD: 03/05/2017 23:53

DT: 03/06/2017 03:09

JOB# [id number]  [id number]

## 2017-03-06 NOTE — PROGRESS NOTES
SUBJECTIVE:  Chart reviewed and the patient interviewed.  Also discussed the

patient's condition with the staff and reviewed records and labs.  The patient

is still confused and she is still withdrawn.  The patient also is still

suspicious and paranoid.  The patient also still has episodes of anger and

irritability and easily agitated.  She is also resisting care and tries to hit

staff when they try to help her with her ADLs.  The patient also is having

episodes of yelling and screaming.  Otherwise, the patient is compliant with

taking her medications with no side effects of medications.



ASSESSMENT:  The patient is still psychotic and can be dangerous to self and

others.



TREATMENT PLAN:  We will continue monitoring her behavior and her condition

closely.  Also, continue to work on her poor impulse control and adjusting

psychotropic medications.  Also, we will monitor any side effects of medications

especially that the patient has coarse tremors in her hands.





DD: 03/06/2017 11:11

DT: 03/06/2017 23:42

JOB# [id number]  [id number]

## 2017-03-06 NOTE — HISTORY & PHYSICAL
ADMITTING DIAGNOSIS:  Confusion, disorientation, and fever.



HISTORY OF PRESENT ILLNESS:  This is a female of [place] origin who was

admitted through the Emergency Room at the [facility] on 02/27/2017 with fever, chills, and apparent urosepsis with confusion

and some respiratory distress.  The patient was admitted in a monitored unit and

followed by cardiopulmonary consultation.  Initial IV antibiotics with pharmacy

protocol were followed.  Blood, urine, and sputum cultures were ordered before

initiating the IV antibiotics.



PAST MEDICAL HISTORY:  The patient has had the following conditions in the past:

1. Advanced mental deterioration with advanced dementia with bipolar and

schizophrenia with aggressive behavior.

2. Urosepsis.

3. Chronic bronchitis with early asthma.

4. Hypertension.

5. Generalized degenerative joint disease with osteoarthritis and osteoporosis.



FAMILY HISTORY:  Devoid of any hereditary diseases.  She has had her usual

childhood diseases. _____



IMMUNIZATIONS:  Has had her immunization at the time they were needed.



ALLERGIES:  No known allergies.



SOCIAL HISTORY:  No history of smoking, alcohol or drug habituation. 



_____, no list of medication at the present time.



REVIEW OF SYSTEMS:  Confusion, disorientation, fever, weakness, debility, some

respiratory distress, and cough.  No abdominal discomfort.  She is both bowel

and urine incontinent.  Alert and not in touch with reality.



PHYSICAL EXAMINATION:

GENERAL:  She appears well developed and nourished for her age.  She is about 5

feet 6 inches, [place] in origin.  Pulse is 80 and respirations about 26.

VITAL SIGNS:  Temperature 100, blood pressure is ____/90.

HEENT:  Skull symmetrical.  No alopecia.  No mass.  No abnormal pulsations or

bruits.

SKIN:  Dry with no dermatosis.  No particular ecchymosis, no jaundice or

discoloration, just warm to touch because of her fever.  The eyes, ears, nose,

and throat basically normal except for atherosclerotic changes to the fundi.

NECK:  Supple.  Thyroid is not enlarged.  Trachea is midline.  Bilateral

palpable carotids.  No bruits.

CHEST:  Symmetrical.  Bilateral breasts are atrophic with no mass,

discoloration, or any areolar discharge.

LUNGS:  Showed inspiratory and expiratory rhonchi and a few basilar rales.

CARDIOVASCULAR:  Regular rate and rhythm.  Cardiac ____ is not enlarged.  No

murmurs, gallops, or arrhythmias.

ABDOMEN:  Benign, physiologic.  No organomegaly.  No rebound, guarding, or

tenderness.  No ascites or shifting dullness.  Peristalsis audible on

auscultation.  Very difficult because of extreme poor cooperation for erectile

or an external genitalia examination.

EXTREMITIES:  Shows weak, but palpable pulses with 1+ pedal edema.  Diminished

Homans sign, but there is no restriction of any active or passive range of

motion of the upper and lower extremities with a basically normal neurological

examination.



ADMITTING DIAGNOSES:

1.  Urosepsis.

2.  Atherosclerotic and hypertensive cardiovascular heart disease with early

congestive heart failure.

3.  Dementia with behavioral disorder.



PLAN:  To admit the patient in the hospital.  Cardiopulmonary and Infectious

Disease consults have been obtained.  She has been started on Rocephin 1 g IV

q.24h. and a psych consult with Dr. Watson.  I will follow her accordingly.





DD: 03/05/2017 23:49

DT: 03/06/2017 01:03

JOB# [id number]  [id number]

## 2017-03-06 NOTE — DISCHARGE SUMMARY
ADMITTING DIAGNOSES:  Urosepsis, resolving, atherosclerotic and hypertensive

cardiovascular heart disease with early congestive heart failure, resolved;

dementia, stable; schizophrenia, bipolar with aggressive behavior, out of

control.



CONDITION ON DISCHARGE:  Stable.



HOSPITAL COURSE:  The patient's behavior was quite alarming and combative.  The

patient was evaluated by Psychiatry [facility] Lopez for control of the behavior.  In the meantime cardiopulmonary wise she

is fine.  She will continue to have Levaquin 100 mg p.o. daily on an outpatient

basis [facility].  We will monitor the CBC

and urinalysis.  Also, in the meantime, I will follow her with Dr. Watson.





DD: 03/05/2017 23:55

DT: 03/06/2017 03:24

JOB# [id number]  [id number]

## 2017-03-07 RX ADMIN — ALBUTEROL SULFATE SCH MG: 2 SYRUP ORAL at 10:25

## 2017-03-07 RX ADMIN — SULFAMETHOXAZOLE AND TRIMETHOPRIM SCH TAB: 800; 160 TABLET ORAL at 17:07

## 2017-03-07 RX ADMIN — ANORECTAL OINTMENT SCH APPL: 15.7; .44; 24; 20.6 OINTMENT TOPICAL at 17:08

## 2017-03-07 RX ADMIN — ANORECTAL OINTMENT SCH APPL: 15.7; .44; 24; 20.6 OINTMENT TOPICAL at 10:27

## 2017-03-07 RX ADMIN — SULFAMETHOXAZOLE AND TRIMETHOPRIM SCH TAB: 800; 160 TABLET ORAL at 10:23

## 2017-03-07 RX ADMIN — ALBUTEROL SULFATE SCH MG: 2 SYRUP ORAL at 17:07

## 2017-03-07 NOTE — INTERNAL MEDICINE PROG NOTE
Internal Medicine Subjective





- Subjective


Service Date: 03/07/17


Patient seen and examined:: with staff


Patient is:: awake


Per staff patient is:: no adverse event





Internal Medicine Objective





- Physical Exam


Vitals and I&O: 


 Vital Signs











Temp  97.9 F   03/06/17 20:47


 


Pulse  61   03/07/17 10:31


 


Resp  30   03/06/17 20:47


 


BP  103/61   03/06/17 20:47


 


Pulse Ox  98   03/06/17 20:47








 Intake & Output











 03/06/17 03/07/17 03/07/17





 18:59 06:59 18:59


 


Intake Total 2400 120 


 


Output Total 800 850 


 


Balance 1600 -730 


 


Weight (lbs)   149 lb 14.4 oz


 


Intake:   


 


  Oral 2400 120 


 


Output:   


 


  Urine 800 850 


 


Other:   


 


  # Voids 3  


 


  # Bowel Movements 1  











Active Medications: 


Current Medications





Albuterol Sulfate (Ventolin)  2 mg PO BID NOREEN


   Stop: 05/01/17 16:59


   Last Admin: 03/07/17 10:25 Dose:  2 mg


Ascorbic Acid (Vitamin C)  500 mg PO DAILY NOREEN


   Stop: 05/02/17 08:59


   Last Admin: 03/07/17 10:27 Dose:  500 mg


Betamethasone Dipropionate (Betamethasone Dip 0.05% Cream)  1 appl TP DAILY NOREEN


   Stop: 05/02/17 08:59


   Last Admin: 03/07/17 10:28 Dose:  1 appl


Calamine/Phenol (Calmoseptine)  1 appl TP BID NOREEN


   Stop: 05/01/17 16:59


   Last Admin: 03/07/17 10:27 Dose:  1 appl


Ciprofloxacin (Cipro 0.3% Ophth Soln)  1 drop EACH EYE BID NOREEN


   Stop: 05/01/17 16:59


   Last Admin: 03/07/17 10:28 Dose:  1 drop


Clopidogrel Bisulfate (Plavix)  75 mg PO DAILY NOREEN


   Stop: 05/02/17 08:59


   Last Admin: 03/07/17 10:29 Dose:  75 mg


Clotrimazole (Lotrimin 1% Cream)  1 appl TP DAILY NOREEN


   Stop: 05/02/17 08:59


   Last Admin: 03/07/17 10:30 Dose:  1 appl


Digoxin (Lanoxin)  0.125 mg PO DAILY NOREEN


   Stop: 05/02/17 08:59


   Last Admin: 03/07/17 10:31 Dose:  Not Given


Divalproex Sodium (Depakote Er)  500 mg PO BID@0900,2100 [facility], [facility]


   PRN Reason: Protocol


   Stop: 05/01/17 20:59


   Last Admin: 03/07/17 10:23 Dose:  500 mg


Donepezil HCl (Aricept)  5 mg PO HS NOREEN


   Stop: 05/01/17 20:59


   Last Admin: 03/06/17 20:47 Dose:  5 mg


Haloperidol (Haldol)  1 mg PO BID PRN; Protocol


   PRN Reason: Agitation


   Stop: 05/01/17 15:48


   Last Admin: 03/03/17 14:47 Dose:  1 mg


Lorazepam (Ativan)  0.5 mg PO Q4H PRN


   PRN Reason: Anxiety


   Stop: 05/02/17 15:11


   Last Admin: 03/05/17 15:35 Dose:  0.5 mg


Midodrine (Proamatine)  5 mg PO Q12HR NOREEN


   Stop: 05/01/17 20:59


   Last Admin: 03/07/17 10:23 Dose:  5 mg


Multivitamins/Vitamin C (Theragran)  1 tab PO DAILY [facility]


   Stop: 05/02/17 08:59


   Last Admin: 03/07/17 10:24 Dose:  1 tab


Promethazine HCl/Dextromethorphan (Phenergan Dm 6.25/15mg-5 Ml)  5 ml PO TID PRN


   PRN Reason: CONGESTION/COUGH


   Stop: 05/01/17 15:53


Quetiapine Fumarate (Seroquel)  50 mg PO [facility]


   PRN Reason: Protocol


   Stop: 05/01/17 20:59


   Last Admin: 03/06/17 20:48 Dose:  50 mg


Quetiapine Fumarate (Seroquel)  12.5 mg PO BID NOREEN


   PRN Reason: Protocol


   Stop: 05/06/17 16:59


Trimethoprim/Sulfamethoxazole (Bactrim Ds)  1 tab PO BID NOREEN


   Stop: 05/01/17 16:59


   Last Admin: 03/07/17 10:23 Dose:  1 tab


Zinc Sulfate (Zinc Sulfate)  220 mg PO DAILY [facility]


   Stop: 05/02/17 08:59


   Last Admin: 03/07/17 10:22 Dose:  220 mg








General: alert


HEENT: NC/AT, PERRLA


Neck: Supple


Lungs: CTAB


Cardiovascular: RRR, Normal S1, Normal S2, without murmur


Abdomen: soft non-tender, non-distended


Extremities: clear





- Procedures


Procedures: 


 Procedures











Procedure Code Date


 


CARDIOPULM RESUSCITA NOS 99.60 10/19/07


 


CL REDUC DISLOC-HAND/FNG 79.74 08/29/07


 


CLOSURE SKIN & SUBCUTANEOUS NEC 86.59 09/10/12


 


CONTINUOUS INVASIVE MECHANICAL VENTILATION <96 CONSEC HRS 96.71 10/19/07


 


DESTRUCT EXT EAR LES NEC 18.29 06/27/01


 


DIPHTHERIA TOXOID ADMIN 99.36 02/03/12


 


EGD BIOPSY SINGLE/MULTIPLE 34315 06/16/10


 


EGD CONTROL BLEEDING ANY 75285 09/03/06


 


ELECTROCARDIOGRAM 89.52 06/27/01


 


ELECTROCARDIOGRAM COMPLETE 25966 06/27/01


 


EMERGENCY DEPT VISIT 36273 02/23/12


 


EMERGENCY DEPT VISIT 37730 02/03/12


 


ENDOSCOP EXC OR DEST OF LESION OR TISSUE OF ESOPHA 42.33 09/03/06


 


ESOPHAGOGASTRODUODENOSCOPY [EGD] W/CLOSED BIOPSY 45.16 06/16/10


 


EXC FACE-MM B9+ALBERTO 2.1-3 CM 41037 06/27/01


 


FREEING OF BOWEL ADHESION 51916 06/19/08


 


HEART/LUNG RESUSCITATION CPR 30227 10/19/07


 


HERNIA REPAIR W/MESH 33133 02/23/12


 


IMMUNIZATION ADMIN 94131 02/03/12


 


INJECT/INFUSE NEC 99.29 04/18/13


 


INSERT EMERGENCY AIRWAY 72012 10/19/07


 


INSERT ENDOTRACHEAL TUBE 96.04 10/19/07


 


INSERT PICC CATH 09194 05/28/16


 


INSERTION OF INFUSION DEV INTO R SUBCLAV VEIN, PERC APPROACH [id number] 05/28/16


 


OP RED-INT FIX TIB/FIBUL 79.36 11/24/98


 


OTH LYSIS-PERITONEAL ADHES 54.59 02/23/12


 


OTHER C.A.T. SCAN 88.38 11/03/98


 


OTHER OPEN INCISIONAL HERNIA REPAIR WITH GRAFT OR PROSTHESIS 53.61 02/23/12


 


PART SM BOWEL RESECT NEC 45.62 06/19/08


 


PERMANENT ILEOSTOMY NEC 46.23 10/19/07


 


RADIOGRAPHIC PROCEDURE 23584 11/03/98


 


REMOVAL OF COLON 57866 10/19/07


 


REMOVAL OF SPLEEN TOTAL 12877 10/19/07


 


REPAIR BOWEL OPENING 02315 06/19/08


 


RPR S/N/AX/GEN/TRNK 2.5CM/< 37594 02/23/12


 


RPR S/N/AX/GEN/TRNK2.6-7.5CM 18076 09/10/12


 


RPR VENTRAL ALLISON INIT REDUC 90525 02/23/12


 


SM BOWEL STOMA CLOSURE 46.51 06/19/08


 


TD VACCINE > 7 IM 75160 02/03/12


 


TETANUS TOXOID ADMINIST 99.38 02/03/12


 


THER/PROPH/DIAG INJ IV PUSH 93686 04/18/13


 


TOT INTRA-ABD COLECTOMY 45.8 10/19/07


 


TOTAL SPLENECTOMY 41.5 10/19/07


 


TREAT FINGER DISLOCATION 96499 08/29/07


 


TREATMENT OF ANKLE FRACTURE 95893 11/24/98


 


ULTRASONOGRAPHY OF RIGHT SUBCLAVIAN VEIN, GUIDANCE [id number] 05/28/16


 


VENOUS CATHETERIZATION NEC 38.93 09/03/06














Internal Medicine Assmt/Plan





- Assessment


Assessment: 





paraplegia


bipolar with schizophrenia


artherosclerotic heart disease


htn


hyperlipidemia








- Plan


Plan: 





swallow eval to be done


aspiration precautions


continue snf meds


cpm








Nutritional Asmnt/Malnutr-PDOC





- Dietary Evaluation


Malnutrition Findings (Please click <Entered> for more info): 








Nutritional Asmnt/Malnutrition                             Start:  03/07/17 11:

03


Text:                                                      Status: Complete    

  


Freq:                                                                          

  


 Document     03/07/17 11:19  GSUN  (Rec: 03/07/17 11:21  GSUN  [id number])


 Nutritional Asmnt/Malnutrition


     Patient General Information


      Nutritional Screening                      Moderate Risk Screening


      Diagnosis                                  Psychosis


      Pertinent Medical Hx/Surgical Hx           Dementia, hypothyroidism,


                                                 obesity, asthma, DJD


      Subjective Information                     71 year old female from SNF,


                                                 transfered from [facility]. Seen


                                                 pt in bed today, pt made eye


                                                 contact with RD, but remained


                                                 silent, did not answer to any


                                                 RD's questions. Pt is


                                                 edentulous. 3/3 dinner RN


                                                 ordered pureed diet. 3/4 pt


                                                 passed swallow eval for mech


                                                 ground with thin liquids. 3/5


                                                 charge nurse ordered nectar


                                                 thickened. Per nursing staff,


                                                 pt yells nonsensically at


                                                 times, otherwise tolerating


                                                 meals well. Avg PO intake 92%


                                                 x past 4 days, meeting


                                                 nutritional needs.


      Current Diet Order/ Nutrition Support      Pureed with nectar thickened


      Pertinent Medications                      Vitamin C, Haldol, Seroquel,


                                                 Zinc Sulfate


      Pertinent Labs                             Reviewed.


     Nutritional Hx/Data


      Height                                     5 ft 7 in


      Height (Calculated Centimeters)            170.2


      Current Weight (lbs)                       149 lb 14.4 oz


      Weight (Calculated Kilograms)              68.0


      Weight (Calculated Grams)                  91302.5


      Ideal Body Weight                          135lb


      Weight Status                              Approriate


     GI Symptoms


      Difficult in:                              Chewing


      Food Allergies                             No


      Cultural/Ethnic/Pentecostalism Belief           Unknown.


      Usual diet at home                         Unknown.


      Skin Integrity/Comment:                    Jourdan 17. Skin intact.


      Current %PO                                Good (%)


     Estimated Nutritional Goals


      Calories/Kcals/Kg                          IBW 61kg, 25-30kcal/kg


      Kcals Calculated                           1525-1830kcal


      Protein g/kg:                              IBW 1g/kg


      Protein Calculated                         61g


      Fluid: ml                                  1525-1830ml (1ml/kcal)


     Nutritional Problem


      1. Problem


       Problem                                   Chewing difficulty related to


       Etiology                                  edentulous, possibly cognition


                                                 aeb


       Signs/Symptoms:                           on pureed diet


     Intervention/Recommendation


      Comments                                   1. Continue with pureed nectar


                                                 thickened diet as pt


                                                 tolerates. PO intake is


                                                 adequate.


                                                 2. Monitor weight.


                                                 Questionable 15lb weight


                                                 difference between 3/1/17


                                                 [facility] bedscale and 3/7/17


                                                 [facility] bedscale.


     Expected Outcomes/Goals


      Expected Outcomes/Goals                    1. PO intake continue to meet


                                                 at least 75% of estimated


                                                 nutritional needs with


                                                 tolerance.

## 2017-03-07 NOTE — PROGRESS NOTES
SUBJECTIVE:  Chart reviewed and the patient interviewed.  Also discussed the

patient's condition with the staff and reviewed records and labs.  The patient

is still having episodes of yelling and screaming for no apparent reason.  The

patient also is still restless and she is still in a confused state.  The

patient also is still rambling and she is still unable to provide any safe plan

for self-care.  On the other hand, the patient at times seems to be sleepy.



ASSESSMENT:  The patient is still considered to be gravely disabled and

psychotic.



TREATMENT PLAN:  We will decrease Seroquel to 12.5 mg twice a day and 50 mg at

bedtime because the patient is slightly sedated.  Also, we will check Depakote

blood level tomorrow.  Depakote blood level that was done on 02/27/2017 came

back to be 78.8.  Also, continue to work on behavioral modifications and

continue to follow up.





DD: 03/07/2017 10:27

DT: 03/07/2017 21:26

JOB# [id number]  [id number]

## 2017-03-08 RX ADMIN — ALBUTEROL SULFATE SCH: 2 SYRUP ORAL at 18:36

## 2017-03-08 RX ADMIN — ANORECTAL OINTMENT SCH: 15.7; .44; 24; 20.6 OINTMENT TOPICAL at 17:43

## 2017-03-08 RX ADMIN — SULFAMETHOXAZOLE AND TRIMETHOPRIM SCH TAB: 800; 160 TABLET ORAL at 18:36

## 2017-03-08 RX ADMIN — ALBUTEROL SULFATE SCH MG: 2 SYRUP ORAL at 18:53

## 2017-03-08 RX ADMIN — SULFAMETHOXAZOLE AND TRIMETHOPRIM SCH: 800; 160 TABLET ORAL at 10:34

## 2017-03-08 RX ADMIN — ANORECTAL OINTMENT SCH: 15.7; .44; 24; 20.6 OINTMENT TOPICAL at 10:34

## 2017-03-08 RX ADMIN — ALBUTEROL SULFATE SCH: 2 SYRUP ORAL at 10:33

## 2017-03-08 NOTE — INTERNAL MEDICINE PROG NOTE
Internal Medicine Subjective





- Subjective


Service Date: 03/08/17


Patient seen and examined:: with staff


Patient is:: awake


Per staff patient is:: no adverse event





Internal Medicine Objective





- Results


Recent Labs: 


 Laboratory Last Values











Valproic Acid  46.5 ug/mL (50.0-100.0)  L  03/08/17  07:50    














- Physical Exam


Vitals and I&O: 


 Vital Signs











Temp  97.6 F   03/07/17 14:00


 


Pulse  71   03/07/17 14:00


 


Resp  20   03/07/17 20:00


 


BP  104/77   03/07/17 14:00


 


Pulse Ox  96   03/07/17 14:00








 Intake & Output











 03/07/17 03/08/17 03/08/17





 18:59 06:59 18:59


 


Intake Total 2800  


 


Output Total 1200 500 


 


Balance 1600 -500 


 


Weight (lbs) 149 lb 14.4 oz  


 


Intake:   


 


  Oral 2800  


 


Output:   


 


  Urine 1200 500 


 


Other:   


 


  # Voids  1 


 


  # Bowel Movements 1 1 


 


  Stool Characteristics Formed  











Active Medications: 


Current Medications





Albuterol Sulfate (Ventolin)  2 mg PO BID NOREEN


   Stop: 05/01/17 16:59


   Last Admin: 03/08/17 10:33 Dose:  Not Given


Ascorbic Acid (Vitamin C)  500 mg PO DAILY NOREEN


   Stop: 05/02/17 08:59


   Last Admin: 03/08/17 10:33 Dose:  Not Given


Betamethasone Dipropionate (Betamethasone Dip 0.05% Cream)  1 appl TP DAILY NOREEN


   Stop: 05/02/17 08:59


   Last Admin: 03/08/17 10:33 Dose:  Not Given


Calamine/Phenol (Calmoseptine)  1 appl TP BID NOREEN


   Stop: 05/01/17 16:59


   Last Admin: 03/08/17 10:34 Dose:  Not Given


Ciprofloxacin (Cipro 0.3% Ophth Soln)  1 drop EACH EYE BID NOREEN


   Stop: 05/01/17 16:59


   Last Admin: 03/08/17 10:33 Dose:  Not Given


Clopidogrel Bisulfate (Plavix)  75 mg PO DAILY NOREEN


   Stop: 05/02/17 08:59


   Last Admin: 03/08/17 10:33 Dose:  Not Given


Clotrimazole (Lotrimin 1% Cream)  1 appl TP DAILY NOREEN


   Stop: 05/02/17 08:59


   Last Admin: 03/08/17 10:33 Dose:  Not Given


Digoxin (Lanoxin)  0.125 mg PO DAILY NOREEN


   Stop: 05/02/17 08:59


   Last Admin: 03/08/17 10:34 Dose:  Not Given


Divalproex Sodium (Depakote Er)  500 mg PO BID@0900,2100 NOREEN


   PRN Reason: Protocol


   Stop: 05/01/17 20:59


   Last Admin: 03/08/17 10:34 Dose:  Not Given


Donepezil HCl (Aricept)  5 mg PO HS ONREEN


   Stop: 05/01/17 20:59


   Last Admin: 03/07/17 20:35 Dose:  5 mg


Haloperidol (Haldol)  1 mg PO BID PRN; Protocol


   PRN Reason: Agitation


   Stop: 05/01/17 15:48


   Last Admin: 03/07/17 13:10 Dose:  1 mg


Lorazepam (Ativan)  0.5 mg PO Q4H PRN


   PRN Reason: Anxiety


   Stop: 05/02/17 15:11


   Last Admin: 03/07/17 13:10 Dose:  0.5 mg


Midodrine (Proamatine)  5 mg PO Q12HR NOREEN


   Stop: 05/01/17 20:59


   Last Admin: 03/08/17 10:34 Dose:  Not Given


Multivitamins/Vitamin C (Theragran)  1 tab PO DAILY NOREEN


   Stop: 05/02/17 08:59


   Last Admin: 03/08/17 10:34 Dose:  Not Given


Promethazine HCl/Dextromethorphan (Phenergan Dm 6.25/15mg-5 Ml)  5 ml PO TID PRN


   PRN Reason: CONGESTION/COUGH


   Stop: 05/01/17 15:53


Quetiapine Fumarate (Seroquel)  50 mg PO HS NOREEN


   PRN Reason: Protocol


   Stop: 05/01/17 20:59


   Last Admin: 03/07/17 20:35 Dose:  50 mg


Quetiapine Fumarate (Seroquel)  12.5 mg PO BID NOREEN


   PRN Reason: Protocol


   Stop: 05/06/17 16:59


   Last Admin: 03/08/17 10:34 Dose:  Not Given


Trimethoprim/Sulfamethoxazole (Bactrim Ds)  1 tab PO BID NOREEN


   Stop: 05/01/17 16:59


   Last Admin: 03/08/17 10:34 Dose:  Not Given


Zinc Sulfate (Zinc Sulfate)  220 mg PO DAILY NOREEN


   Stop: 05/02/17 08:59


   Last Admin: 03/08/17 10:35 Dose:  Not Given








General: alert


HEENT: NC/AT, PERRLA


Neck: Supple


Lungs: CTAB


Cardiovascular: RRR, Normal S1, Normal S2, without murmur


Abdomen: soft non-tender, non-distended, positive bowel sound


Extremities: clear


Neurological: no change





- Procedures


Procedures: 


 Procedures











Procedure Code Date


 


CARDIOPULM RESUSCITA NOS 99.60 10/19/07


 


CL REDUC DISLOC-HAND/FNG 79.74 08/29/07


 


CLOSURE SKIN & SUBCUTANEOUS NEC 86.59 09/10/12


 


CONTINUOUS INVASIVE MECHANICAL VENTILATION <96 CONSEC HRS 96.71 10/19/07


 


DESTRUCT EXT EAR LES NEC 18.29 06/27/01


 


DIPHTHERIA TOXOID ADMIN 99.36 02/03/12


 


EGD BIOPSY SINGLE/MULTIPLE 03733 06/16/10


 


EGD CONTROL BLEEDING ANY 41169 09/03/06


 


ELECTROCARDIOGRAM 89.52 06/27/01


 


ELECTROCARDIOGRAM COMPLETE 68094 06/27/01


 


EMERGENCY DEPT VISIT 29848 02/23/12


 


EMERGENCY DEPT VISIT 59367 02/03/12


 


ENDOSCOP EXC OR DEST OF LESION OR TISSUE OF ESOPHA 42.33 09/03/06


 


ESOPHAGOGASTRODUODENOSCOPY [EGD] W/CLOSED BIOPSY 45.16 06/16/10


 


EXC FACE-MM B9+ALBERTO 2.1-3 CM 59917 06/27/01


 


FREEING OF BOWEL ADHESION 50372 06/19/08


 


HEART/LUNG RESUSCITATION CPR 79345 10/19/07


 


HERNIA REPAIR W/MESH 35630 02/23/12


 


IMMUNIZATION ADMIN 72121 02/03/12


 


INJECT/INFUSE NEC 99.29 04/18/13


 


INSERT EMERGENCY AIRWAY 99536 10/19/07


 


INSERT ENDOTRACHEAL TUBE 96.04 10/19/07


 


INSERT PICC CATH 61695 05/28/16


 


INSERTION OF INFUSION DEV INTO R SUBCLAV VEIN, PERC APPROACH [id number] 05/28/16


 


OP RED-INT FIX TIB/FIBUL 79.36 11/24/98


 


OTH LYSIS-PERITONEAL ADHES 54.59 02/23/12


 


OTHER C.A.T. SCAN 88.38 11/03/98


 


OTHER OPEN INCISIONAL HERNIA REPAIR WITH GRAFT OR PROSTHESIS 53.61 02/23/12


 


PART SM BOWEL RESECT NEC 45.62 06/19/08


 


PERMANENT ILEOSTOMY NEC 46.23 10/19/07


 


RADIOGRAPHIC PROCEDURE 38260 11/03/98


 


REMOVAL OF COLON 46271 10/19/07


 


REMOVAL OF SPLEEN TOTAL 06124 10/19/07


 


REPAIR BOWEL OPENING 21675 06/19/08


 


RPR S/N/AX/GEN/TRNK 2.5CM/< 43436 02/23/12


 


RPR S/N/AX/GEN/TRNK2.6-7.5CM 68832 09/10/12


 


RPR VENTRAL ALLISON INIT REDUC 97287 02/23/12


 


SM BOWEL STOMA CLOSURE 46.51 06/19/08


 


TD VACCINE > 7 IM 37830 02/03/12


 


TETANUS TOXOID ADMINIST 99.38 02/03/12


 


THER/PROPH/DIAG INJ IV PUSH 90587 04/18/13


 


TOT INTRA-ABD COLECTOMY 45.8 10/19/07


 


TOTAL SPLENECTOMY 41.5 10/19/07


 


TREAT FINGER DISLOCATION 07373 08/29/07


 


TREATMENT OF ANKLE FRACTURE 80668 11/24/98


 


ULTRASONOGRAPHY OF RIGHT SUBCLAVIAN VEIN, GUIDANCE [id number] 05/28/16


 


VENOUS CATHETERIZATION NEC 38.93 09/03/06














Internal Medicine Assmt/Plan





- Assessment


Assessment: 





paraplegia


bipolar with schizophrenia


artherosclerotic heart disease


htn


hyperlipidemia








- Plan


Plan: 





aspiration precautions


continue snf meds


cpm








Nutritional Asmnt/Malnutr-PDOC





- Dietary Evaluation


Malnutrition Findings (Please click <Entered> for more info): 








Nutritional Asmnt/Malnutrition                             Start:  03/07/17 11:

03


Text:                                                      Status: Complete    

  


Freq:                                                                          

  


 Document     03/07/17 11:19  GSUN  (Rec: 03/07/17 11:21  [facility]


 Nutritional Asmnt/Malnutrition


     Patient General Information


      Nutritional Screening                      Moderate Risk Screening


      Diagnosis                                  Psychosis


      Pertinent Medical Hx/Surgical Hx           Dementia, hypothyroidism,


                                                 obesity, asthma, DJD


      Subjective Information                     71 year old female from SNF,


                                                 transfered from [facility]. Seen


                                                 pt in bed today, pt made eye


                                                 contact with RD, but remained


                                                 silent, did not answer to any


                                                 RD's questions. Pt is


                                                 edentulous. 3/3 dinner RN


                                                 ordered pureed diet. 3/4 pt


                                                 passed swallow eval for mech


                                                 ground with thin liquids. 3/5


                                                 charge nurse ordered nectar


                                                 thickened. Per nursing staff,


                                                 pt yells nonsensically at


                                                 times, otherwise tolerating


                                                 meals well. Avg PO intake 92%


                                                 x past 4 days, meeting


                                                 nutritional needs.


      Current Diet Order/ Nutrition Support      Pureed with nectar thickened


      Pertinent Medications                      Vitamin C, Haldol, Seroquel,


                                                 Zinc Sulfate


      Pertinent Labs                             Reviewed.


     Nutritional Hx/Data


      Height                                     5 ft 7 in


      Height (Calculated Centimeters)            170.2


      Current Weight (lbs)                       149 lb 14.4 oz


      Weight (Calculated Kilograms)              68.0


      Weight (Calculated Grams)                  39162.5


      Ideal Body Weight                          135lb


      Weight Status                              Approriate


     GI Symptoms


      Difficult in:                              Chewing


      Food Allergies                             No


      Cultural/Ethnic/Christianity Belief           Unknown.


      Usual diet at home                         Unknown.


      Skin Integrity/Comment:                    Jourdan 17. Skin intact.


      Current %PO                                Good (%)


     Estimated Nutritional Goals


      Calories/Kcals/Kg                          IBW 61kg, 25-30kcal/kg


      Kcals Calculated                           1525-1830kcal


      Protein g/kg:                              IBW 1g/kg


      Protein Calculated                         61g


      Fluid: ml                                  1525-1830ml (1ml/kcal)


     Nutritional Problem


      1. Problem


       Problem                                   Chewing difficulty related to


       Etiology                                  edentulous, possibly cognition


                                                 aeb


       Signs/Symptoms:                           on pureed diet


     Intervention/Recommendation


      Comments                                   1. Continue with pureed nectar


                                                 thickened diet as pt


                                                 tolerates. PO intake is


                                                 adequate.


                                                 2. Monitor weight.


                                                 Questionable 15lb weight


                                                 difference between 3/1/17


                                                 [facility] bedscale and 3/7/17


                                                 [facility] bedscale.


     Expected Outcomes/Goals


      Expected Outcomes/Goals                    1. PO intake continue to meet


                                                 at least 75% of estimated


                                                 nutritional needs with


                                                 tolerance.

## 2017-03-08 NOTE — PROGRESS NOTES
SUBJECTIVE:  Chart reviewed and the patient interviewed.  Also discussed the

patient's condition with the staff and reviewed records and labs.  The patient

still has periods of irritability and agitation with yelling and screaming.  The

patient also still seems to be suspicious and paranoid.  The patient also still

wants to be left alone and interacting minimally with others.  I decreased

Seroquel yesterday to 12.5 mg twice a day and the patient seems to be less

sedated, but at the same time, she is more agitated with yelling and screaming

episodes.



ASSESSMENT:  The patient is still psychotic.



TREATMENT PLAN:  Continue monitoring her behavior and her condition closely. 

Also, continue adjusting psychotropic medications and followup.





DD: 03/08/2017 10:16

DT: 03/08/2017 20:21

[facility]# [id number]  [id number]

## 2017-03-09 RX ADMIN — ANORECTAL OINTMENT SCH APPL: 15.7; .44; 24; 20.6 OINTMENT TOPICAL at 09:51

## 2017-03-09 RX ADMIN — SULFAMETHOXAZOLE AND TRIMETHOPRIM SCH TAB: 800; 160 TABLET ORAL at 09:50

## 2017-03-09 RX ADMIN — ALBUTEROL SULFATE SCH MG: 2 SYRUP ORAL at 09:49

## 2017-03-09 RX ADMIN — ANORECTAL OINTMENT SCH APPL: 15.7; .44; 24; 20.6 OINTMENT TOPICAL at 17:48

## 2017-03-09 RX ADMIN — ALBUTEROL SULFATE SCH MG: 2 SYRUP ORAL at 17:47

## 2017-03-09 NOTE — INTERNAL MEDICINE PROG NOTE
Internal Medicine Subjective





- Subjective


Service Date: 03/09/17


Patient seen and examined:: with staff


Patient is:: awake


Per staff patient is:: no adverse event





Internal Medicine Objective





- Results


Recent Labs: 


 Laboratory Last Values











Valproic Acid  46.5 ug/mL (50.0-100.0)  L  03/08/17  07:50    














- Physical Exam


Vitals and I&O: 


 Vital Signs











Temp  97.6 F   03/07/17 14:00


 


Pulse  71   03/07/17 14:00


 


Resp  20   03/08/17 20:00


 


BP  104/77   03/07/17 14:00


 


Pulse Ox  96   03/07/17 14:00








 Intake & Output











 03/08/17 03/09/17 03/09/17





 18:59 06:59 18:59


 


Intake Total 1000  


 


Output Total 500  


 


Balance 500  


 


Intake:   


 


  Oral 1000  


 


Output:   


 


  Urine 500  


 


Other:   


 


  # Bowel Movements 0  











Active Medications: 


Current Medications





Albuterol Sulfate (Ventolin)  2 mg PO BID NOREEN


   Stop: 05/01/17 16:59


   Last Admin: 03/09/17 09:49 Dose:  2 mg


Ascorbic Acid (Vitamin C)  500 mg PO DAILY NOREEN


   Stop: 05/02/17 08:59


   Last Admin: 03/09/17 09:50 Dose:  500 mg


Betamethasone Dipropionate (Betamethasone Dip 0.05% Cream)  1 appl TP DAILY NOREEN


   Stop: 05/02/17 08:59


   Last Admin: 03/09/17 09:51 Dose:  1 appl


Calamine/Phenol (Calmoseptine)  1 appl TP BID NOREEN


   Stop: 05/01/17 16:59


   Last Admin: 03/09/17 09:51 Dose:  1 appl


Ciprofloxacin (Cipro 0.3% Ophth Soln)  1 drop EACH EYE BID NOREEN


   Stop: 05/01/17 16:59


   Last Admin: 03/09/17 09:52 Dose:  1 drop


Clopidogrel Bisulfate (Plavix)  75 mg PO DAILY NOREEN


   Stop: 05/02/17 08:59


   Last Admin: 03/09/17 09:49 Dose:  75 mg


Clotrimazole (Lotrimin 1% Cream)  1 appl TP DAILY NOREEN


   Stop: 05/02/17 08:59


   Last Admin: 03/09/17 09:51 Dose:  1 appl


Digoxin (Lanoxin)  0.125 mg PO DAILY NOREEN


   Stop: 05/02/17 08:59


   Last Admin: 03/08/17 10:34 Dose:  Not Given


Divalproex Sodium (Depakote Er)  500 mg PO BID@0900,2100 NOREEN


   PRN Reason: Protocol


   Stop: 05/01/17 20:59


   Last Admin: 03/09/17 09:50 Dose:  500 mg


Donepezil HCl (Aricept)  5 mg PO HS NOREEN


   Stop: 05/01/17 20:59


   Last Admin: 03/08/17 20:49 Dose:  5 mg


Haloperidol (Haldol)  1 mg PO BID PRN; Protocol


   PRN Reason: Agitation


   Stop: 05/01/17 15:48


   Last Admin: 03/08/17 15:13 Dose:  1 mg


Lorazepam (Ativan)  0.5 mg PO Q4H PRN


   PRN Reason: Anxiety


   Stop: 05/02/17 15:11


   Last Admin: 03/08/17 15:05 Dose:  0.5 mg


Midodrine (Proamatine)  5 mg PO Q12HR NOREEN


   Stop: 05/01/17 20:59


   Last Admin: 03/09/17 09:51 Dose:  5 mg


Multivitamins/Vitamin C (Theragran)  1 tab PO DAILY NOREEN


   Stop: 05/02/17 08:59


   Last Admin: 03/09/17 09:50 Dose:  1 tab


Promethazine HCl/Dextromethorphan (Phenergan Dm 6.25/15mg-5 Ml)  5 ml PO TID PRN


   PRN Reason: CONGESTION/COUGH


   Stop: 05/01/17 15:53


Quetiapine Fumarate (Seroquel)  50 mg PO HS NOREEN


   PRN Reason: Protocol


   Stop: 05/01/17 20:59


   Last Admin: 03/08/17 20:49 Dose:  50 mg


Quetiapine Fumarate (Seroquel)  12.5 mg PO TID NOREEN


   PRN Reason: Protocol


   Stop: 05/08/17 13:59


Trimethoprim/Sulfamethoxazole (Bactrim Ds)  1 tab PO BID NOREEN


   Stop: 05/01/17 16:59


   Last Admin: 03/09/17 09:50 Dose:  1 tab


Zinc Sulfate (Zinc Sulfate)  220 mg PO DAILY NOREEN


   Stop: 05/02/17 08:59


   Last Admin: 03/09/17 09:49 Dose:  220 mg








General: alert


HEENT: NC/AT, PERRLA


Neck: Supple


Lungs: CTAB


Cardiovascular: RRR, Normal S1, Normal S2, without murmur


Abdomen: soft non-tender, non-distended, positive bowel sound


Neurological: no change





- Procedures


Procedures: 


 Procedures











Procedure Code Date


 


CARDIOPULM RESUSCITA NOS 99.60 10/19/07


 


CL REDUC DISLOC-HAND/FNG 79.74 08/29/07


 


CLOSURE SKIN & SUBCUTANEOUS NEC 86.59 09/10/12


 


CONTINUOUS INVASIVE MECHANICAL VENTILATION <96 CONSEC HRS 96.71 10/19/07


 


DESTRUCT EXT EAR LES NEC 18.29 06/27/01


 


DIPHTHERIA TOXOID ADMIN 99.36 02/03/12


 


EGD BIOPSY SINGLE/MULTIPLE 88525 06/16/10


 


EGD CONTROL BLEEDING ANY 63907 09/03/06


 


ELECTROCARDIOGRAM 89.52 06/27/01


 


ELECTROCARDIOGRAM COMPLETE 33304 06/27/01


 


EMERGENCY DEPT VISIT 49048 02/23/12


 


EMERGENCY DEPT VISIT 16586 02/03/12


 


ENDOSCOP EXC OR DEST OF LESION OR TISSUE OF ESOPHA 42.33 09/03/06


 


ESOPHAGOGASTRODUODENOSCOPY [EGD] W/CLOSED BIOPSY 45.16 06/16/10


 


EXC FACE-MM B9+ALBERTO 2.1-3 CM 84085 06/27/01


 


FREEING OF BOWEL ADHESION 38116 06/19/08


 


HEART/LUNG RESUSCITATION CPR 71960 10/19/07


 


HERNIA REPAIR W/MESH 23722 02/23/12


 


IMMUNIZATION ADMIN 73012 02/03/12


 


INJECT/INFUSE NEC 99.29 04/18/13


 


INSERT EMERGENCY AIRWAY 64548 10/19/07


 


INSERT ENDOTRACHEAL TUBE 96.04 10/19/07


 


INSERT PICC CATH 14635 05/28/16


 


INSERTION OF INFUSION DEV INTO R SUBCLAV VEIN, PERC APPROACH [id number] 05/28/16


 


OP RED-INT FIX TIB/FIBUL 79.36 11/24/98


 


OTH LYSIS-PERITONEAL ADHES 54.59 02/23/12


 


OTHER C.A.T. SCAN 88.38 11/03/98


 


OTHER OPEN INCISIONAL HERNIA REPAIR WITH GRAFT OR PROSTHESIS 53.61 02/23/12


 


PART SM BOWEL RESECT NEC 45.62 06/19/08


 


PERMANENT ILEOSTOMY NEC 46.23 10/19/07


 


RADIOGRAPHIC PROCEDURE 93910 11/03/98


 


REMOVAL OF COLON 93876 10/19/07


 


REMOVAL OF SPLEEN TOTAL 96712 10/19/07


 


REPAIR BOWEL OPENING 23363 06/19/08


 


RPR S/N/AX/GEN/TRNK 2.5CM/< 48819 02/23/12


 


RPR S/N/AX/GEN/TRNK2.6-7.5CM 59946 09/10/12


 


RPR VENTRAL ALLISON INIT REDUC 42919 02/23/12


 


SM BOWEL STOMA CLOSURE 46.51 06/19/08


 


TD VACCINE > 7 IM 99760 02/03/12


 


TETANUS TOXOID ADMINIST 99.38 02/03/12


 


THER/PROPH/DIAG INJ IV PUSH 15220 04/18/13


 


TOT INTRA-ABD COLECTOMY 45.8 10/19/07


 


TOTAL SPLENECTOMY 41.5 10/19/07


 


TREAT FINGER DISLOCATION 63080 08/29/07


 


TREATMENT OF ANKLE FRACTURE 92616 11/24/98


 


ULTRASONOGRAPHY OF RIGHT SUBCLAVIAN VEIN, GUIDANCE [id number] 05/28/16


 


VENOUS CATHETERIZATION NEC 38.93 09/03/06














Internal Medicine Assmt/Plan





- Assessment


Assessment: 





paraplegia


bipolar with schizophrenia


artherosclerotic heart disease


htn


hyperlipidemia








- Plan


Plan: 





aspiration precautions


continue snf meds


cpm








Nutritional Asmnt/Malnutr-PDOC





- Dietary Evaluation


Malnutrition Findings (Please click <Entered> for more info): 








Nutritional Asmnt/Malnutrition                             Start:  03/07/17 11:

03


Text:                                                      Status: Complete    

  


Freq:                                                                          

  


 Document     03/07/17 11:19  GSUN  (Rec: 03/07/17 11:21  GSUN  [id number])


 Nutritional Asmnt/Malnutrition


     Patient General Information


      Nutritional Screening                      Moderate Risk Screening


      Diagnosis                                  Psychosis


      Pertinent Medical Hx/Surgical Hx           Dementia, hypothyroidism,


                                                 obesity, asthma, DJD


      Subjective Information                     71 year old female from SNF,


                                                 transfered from [facility]. Seen


                                                 pt in bed today, pt made eye


                                                 contact with RD, but remained


                                                 silent, did not answer to any


                                                 RD's questions. Pt is


                                                 edentulous. 3/3 dinner RN


                                                 ordered pureed diet. 3/4 pt


                                                 passed swallow eval for mech


                                                 ground with thin liquids. 3/5


                                                 charge nurse ordered nectar


                                                 thickened. Per nursing staff,


                                                 pt yells nonsensically at


                                                 times, otherwise tolerating


                                                 meals well. Avg PO intake 92%


                                                 x past 4 days, meeting


                                                 nutritional needs.


      Current Diet Order/ Nutrition Support      Pureed with nectar thickened


      Pertinent Medications                      Vitamin C, Haldol, Seroquel,


                                                 Zinc Sulfate


      Pertinent Labs                             Reviewed.


     Nutritional Hx/Data


      Height                                     5 ft 7 in


      Height (Calculated Centimeters)            170.2


      Current Weight (lbs)                       149 lb 14.4 oz


      Weight (Calculated Kilograms)              68.0


      Weight (Calculated Grams)                  12898.5


      Ideal Body Weight                          135lb


      Weight Status                              Approriate


     GI Symptoms


      Difficult in:                              Chewing


      Food Allergies                             No


      Cultural/Ethnic/M[employer]onism Belief           Unknown.


      Usual diet at home                         Unknown.


      Skin Integrity/Comment:                    Jourdan 17. Skin intact.


      Current %PO                                Good (%)


     Estimated Nutritional Goals


      Calories/Kcals/Kg                          IBW 61kg, 25-30kcal/kg


      Kcals Calculated                           1525-1830kcal


      Protein g/kg:                              IBW 1g/kg


      Protein Calculated                         61g


      Fluid: ml                                  1525-1830ml (1ml/kcal)


     Nutritional Problem


      1. Problem


       Problem                                   Chewing difficulty related to


       Etiology                                  edentulous, possibly cognition


                                                 aeb


       Signs/Symptoms:                           on pureed diet


     Intervention/Recommendation


      Comments                                   1. Continue with pureed nectar


                                                 thickened diet as pt


                                                 tolerates. PO intake is


                                                 adequate.


                                                 2. Monitor weight.


                                                 Questionable 15lb weight


                                                 difference between 3/1/17


                                                 [facility] bedscale and 3/7/17


                                                 [facility] bedscale.


     Expected Outcomes/Goals


      Expected Outcomes/Goals                    1. PO intake continue to meet


                                                 at least 75% of estimated


                                                 nutritional needs with


                                                 tolerance.

## 2017-03-10 RX ADMIN — ALBUTEROL SULFATE SCH: 2 SYRUP ORAL at 13:38

## 2017-03-10 RX ADMIN — ANORECTAL OINTMENT SCH APPL: 15.7; .44; 24; 20.6 OINTMENT TOPICAL at 09:15

## 2017-03-10 NOTE — INTERNAL MEDICINE PROG NOTE
Internal Medicine Subjective





- Subjective


Patient seen and examined:: with staff, chart reviewed


Patient is:: awake, non-interactive


Per staff patient is:: no episodes of fall, poor appetite, confused





Internal Medicine Objective





- Results


Recent Labs: 


 Laboratory Last Values











Valproic Acid  46.5 ug/mL (50.0-100.0)  L  03/08/17  07:50    














- Physical Exam


Vitals and I&O: 


 Vital Signs











Temp  97.6 F   03/09/17 20:53


 


Pulse  62   03/10/17 08:52


 


Resp  18   03/10/17 08:00


 


BP  127/91   03/09/17 20:53


 


Pulse Ox  97   03/09/17 20:53








 Intake & Output











 03/09/17 03/10/17 03/10/17





 18:59 06:59 18:59


 


Intake Total 1000  


 


Output Total 450  


 


Balance 550  


 


Intake:   


 


  Oral 1000  


 


Output:   


 


  Urine 450  


 


Other:   


 


  # Voids  2 


 


  # Bowel Movements 0  











Active Medications: 


Current Medications





Albuterol Sulfate (Ventolin)  2 mg PO BID NOREEN


   Stop: 05/01/17 16:59


   Last Admin: 03/09/17 17:47 Dose:  2 mg


Ascorbic Acid (Vitamin C)  500 mg PO DAILY NOREEN


   Stop: 05/02/17 08:59


   Last Admin: 03/10/17 08:51 Dose:  500 mg


Betamethasone Dipropionate (Betamethasone Dip 0.05% Cream)  1 appl TP DAILY NOREEN


   Stop: 05/02/17 08:59


   Last Admin: 03/10/17 12:28 Dose:  1 appl


Calamine/Phenol (Calmoseptine)  1 appl TP BID NOREEN


   Stop: 05/01/17 16:59


   Last Admin: 03/10/17 09:15 Dose:  1 appl


Ciprofloxacin (Cipro 0.3% Ophth Soln)  1 drop EACH EYE BID NOREEN


   Stop: 05/01/17 16:59


   Last Admin: 03/10/17 08:50 Dose:  1 drop


Clopidogrel Bisulfate (Plavix)  75 mg PO DAILY NOREEN


   Stop: 05/02/17 08:59


   Last Admin: 03/10/17 08:51 Dose:  75 mg


Clotrimazole (Lotrimin 1% Cream)  1 appl TP DAILY NOREEN


   Stop: 05/02/17 08:59


   Last Admin: 03/10/17 12:28 Dose:  1 appl


Digoxin (Lanoxin)  0.125 mg PO DAILY NOREEN


   Stop: 05/02/17 08:59


   Last Admin: 03/10/17 08:52 Dose:  0.125 mg


Divalproex Sodium (Depakote Er)  500 mg PO BID@0900,2100 NOREEN


   PRN Reason: Protocol


   Stop: 05/01/17 20:59


   Last Admin: 03/10/17 08:51 Dose:  500 mg


Donepezil HCl (Aricept)  5 mg PO HS NOREEN


   Stop: 05/01/17 20:59


   Last Admin: 03/09/17 20:38 Dose:  5 mg


Haloperidol (Haldol)  1 mg PO BID PRN; Protocol


   PRN Reason: Agitation


   Stop: 05/01/17 15:48


   Last Admin: 03/08/17 15:13 Dose:  1 mg


Lorazepam (Ativan)  0.5 mg PO Q4H PRN


   PRN Reason: Anxiety


   Stop: 05/02/17 15:11


   Last Admin: 03/08/17 15:05 Dose:  0.5 mg


Midodrine (Proamatine)  5 mg PO Q12HR NOREEN


   Stop: 05/01/17 20:59


   Last Admin: 03/10/17 08:51 Dose:  5 mg


Multivitamins/Vitamin C (Theragran)  1 tab PO DAILY NOREEN


   Stop: 05/02/17 08:59


   Last Admin: 03/10/17 08:52 Dose:  1 tab


Promethazine HCl/Dextromethorphan (Phenergan Dm 6.25/15mg-5 Ml)  5 ml PO TID PRN


   PRN Reason: CONGESTION/COUGH


   Stop: 05/01/17 15:53


   Last Admin: 03/10/17 09:15 Dose:  5 ml


Quetiapine Fumarate (Seroquel)  50 mg PO HS NOREEN


   PRN Reason: Protocol


   Stop: 05/01/17 20:59


   Last Admin: 03/09/17 20:37 Dose:  50 mg


Quetiapine Fumarate (Seroquel)  12.5 mg PO TID NOREEN


   PRN Reason: Protocol


   Stop: 05/08/17 13:59


   Last Admin: 03/10/17 08:51 Dose:  12.5 mg


Zinc Sulfate (Zinc Sulfate)  220 mg PO DAILY NOREEN


   Stop: 05/02/17 08:59


   Last Admin: 03/10/17 08:52 Dose:  220 mg








General: demented


HEENT: NC/AT, PERRLA


Neck: Supple, No JVD


Lungs: CTAB


Cardiovascular: RRR, Normal S1, Normal S2


Abdomen: soft non-tender, globular, positive bowel sound


Extremities: excoriation, contracture


Neurological: disorganized, unable to follow command





- Procedures


Procedures: 


 Procedures











Procedure Code Date


 


CARDIOPULM RESUSCITA NOS 99.60 10/19/07


 


CL REDUC DISLOC-HAND/FNG 79.74 08/29/07


 


CLOSURE SKIN & SUBCUTANEOUS NEC 86.59 09/10/12


 


CONTINUOUS INVASIVE MECHANICAL VENTILATION <96 CONSEC HRS 96.71 10/19/07


 


DESTRUCT EXT EAR LES NEC 18.29 06/27/01


 


DIPHTHERIA TOXOID ADMIN 99.36 02/03/12


 


EGD BIOPSY SINGLE/MULTIPLE 24717 06/16/10


 


EGD CONTROL BLEEDING ANY 89706 09/03/06


 


ELECTROCARDIOGRAM 89.52 06/27/01


 


ELECTROCARDIOGRAM COMPLETE 06921 06/27/01


 


EMERGENCY DEPT VISIT 58463 02/23/12


 


EMERGENCY DEPT VISIT 88230 02/03/12


 


ENDOSCOP EXC OR DEST OF LESION OR TISSUE OF ESOPHA 42.33 09/03/06


 


ESOPHAGOGASTRODUODENOSCOPY [EGD] W/CLOSED BIOPSY 45.16 06/16/10


 


EXC FACE-MM B9+ALBERTO 2.1-3 CM 44810 06/27/01


 


FREEING OF BOWEL ADHESION 01260 06/19/08


 


HEART/LUNG RESUSCITATION CPR 21003 10/19/07


 


HERNIA REPAIR W/MESH 79818 02/23/12


 


IMMUNIZATION ADMIN 00880 02/03/12


 


INJECT/INFUSE NEC 99.29 04/18/13


 


INSERT EMERGENCY AIRWAY 87411 10/19/07


 


INSERT ENDOTRACHEAL TUBE 96.04 10/19/07


 


INSERT PICC CATH 38892 05/28/16


 


INSERTION OF INFUSION DEV INTO R SUBCLAV VEIN, PERC APPROACH [id number] 05/28/16


 


OP RED-INT FIX TIB/FIBUL 79.36 11/24/98


 


OTH LYSIS-PERITONEAL ADHES 54.59 02/23/12


 


OTHER C.A.T. SCAN 88.38 11/03/98


 


OTHER OPEN INCISIONAL HERNIA REPAIR WITH GRAFT OR PROSTHESIS 53.61 02/23/12


 


PART SM BOWEL RESECT NEC 45.62 06/19/08


 


PERMANENT ILEOSTOMY NEC 46.23 10/19/07


 


RADIOGRAPHIC PROCEDURE 97779 11/03/98


 


REMOVAL OF COLON 52161 10/19/07


 


REMOVAL OF SPLEEN TOTAL 17091 10/19/07


 


REPAIR BOWEL OPENING 40445 06/19/08


 


RPR S/N/AX/GEN/TRNK 2.5CM/< 27430 02/23/12


 


RPR S/N/AX/GEN/TRNK2.6-7.5CM 73872 09/10/12


 


RPR VENTRAL ALLISON INIT REDUC 64096 02/23/12


 


SM BOWEL STOMA CLOSURE 46.51 06/19/08


 


TD VACCINE > 7 IM 92743 02/03/12


 


TETANUS TOXOID ADMINIST 99.38 02/03/12


 


THER/PROPH/DIAG INJ IV PUSH 79541 04/18/13


 


TOT INTRA-ABD COLECTOMY 45.8 10/19/07


 


TOTAL SPLENECTOMY 41.5 10/19/07


 


TREAT FINGER DISLOCATION 02659 08/29/07


 


TREATMENT OF ANKLE FRACTURE 83534 11/24/98


 


ULTRASONOGRAPHY OF RIGHT SUBCLAVIAN VEIN, GUIDANCE [id number] 05/28/16


 


VENOUS CATHETERIZATION NEC 38.93 09/03/06














Internal Medicine Assmt/Plan





- Assessment


Assessment: 





paraplegia


bipolar with schizophrenia


artherosclerotic heart disease


htn


hyperlipidemia


uti





- Plan


Plan: 





cont on bp meds 


cont on po abx


aspiration precaution 


cont on low na diet 


dw rn





Nutritional Asmnt/Malnutr-PDOC





- Dietary Evaluation


Malnutrition Findings (Please click <Entered> for more info): 








Nutritional Asmnt/Malnutrition                             Start:  03/07/17 11:

03


Text:                                                      Status: Complete    

  


Freq:                                                                          

  


 Document     03/07/17 11:19  GSUN  (Rec: 03/07/17 11:21  GSUN  [id number])


 Nutritional Asmnt/Malnutrition


     Patient General Information


      Nutritional Screening                      Moderate Risk Screening


      Diagnosis                                  Psychosis


      Pertinent Medical Hx/Surgical Hx           Dementia, hypothyroidism,


                                                 obesity, asthma, DJD


      Subjective Information                     71 year old female from SNF,


                                                 transfered from [facility]. Seen


                                                 pt in bed today, pt made eye


                                                 contact with RD, but remained


                                                 silent, did not answer to any


                                                 RD's questions. Pt is


                                                 edentulous. 3/3 dinner RN


                                                 ordered pureed diet. 3/4 pt


                                                 passed swallow eval for mech


                                                 ground with thin liquids. 3/5


                                                 charge nurse ordered nectar


                                                 thickened. Per nursing staff,


                                                 pt yells nonsensically at


                                                 times, otherwise tolerating


                                                 meals well. Avg PO intake 92%


                                                 x past 4 days, meeting


                                                 nutritional needs.


      Current Diet Order/ Nutrition Support      Pureed with nectar thickened


      Pertinent Medications                      Vitamin C, Haldol, Seroquel,


                                                 Zinc Sulfate


      Pertinent Labs                             Reviewed.


     Nutritional Hx/Data


      Height                                     1.7 m


      Height (Calculated Centimeters)            170.2


      Current Weight (lbs)                       67.993 kg


      Weight (Calculated Kilograms)              68.0


      Weight (Calculated Grams)                  83553.5


      Ideal Body Weight                          135lb


      Weight Status                              Approriate


     GI Symptoms


      Difficult in:                              Chewing


      Food Allergies                             No


      Cultural/Ethnic/Caodaism Belief           Unknown.


      Usual diet at home                         Unknown.


      Skin Integrity/Comment:                    Jourdan 17. Skin intact.


      Current %PO                                Good (%)


     Estimated Nutritional Goals


      Calories/Kcals/Kg                          IBW 61kg, 25-30kcal/kg


      Kcals Calculated                           1525-1830kcal


      Protein g/kg:                              IBW 1g/kg


      Protein Calculated                         61g


      Fluid: ml                                  1525-1830ml (1ml/kcal)


     Nutritional Problem


      1. Problem


       Problem                                   Chewing difficulty related to


       Etiology                                  edentulous, possibly cognition


                                                 aeb


       Signs/Symptoms:                           on pureed diet


     Intervention/Recommendation


      Comments                                   1. Continue with pureed nectar


                                                 thickened diet as pt


                                                 tolerates. PO intake is


                                                 adequate.


                                                 2. Monitor weight.


                                                 Questionable 15lb weight


                                                 difference between 3/1/17


                                                 [facility] bedscale and 3/7/17


                                                 [facility] bedscale.


     Expected Outcomes/Goals


      Expected Outcomes/Goals                    1. PO intake continue to meet


                                                 at least 75% of estimated


                                                 nutritional needs with


                                                 tolerance.

## 2017-03-10 NOTE — PROGRESS NOTES
SUBJECTIVE:  Chart reviewed and the patient interviewed.  Also discussed the

patient's condition with the staff and reviewed records and labs.  The patient

is still yelling and screaming constantly and still needs lots of redirections. 

The patient also still has difficulty following any of staff directions. 

Situation similar to what was happening upon admission.  The patient also seems

to be sensitive to medications and adjusting her medications needs a lot of

caring.  She is compliant with taking her medications with no side effects of

medications.



During interview, patient has flat affect and she is still unable to answer any

of the questions ____, but still having episodes of yelling and screaming and

anger and difficulty following directions.



ASSESSMENT:  The patient is still psychotic and agitated.



TREATMENT PLAN:  We will increase Seroquel to 12.5 mg 3 times a day and 50 mg

____.  Also, we will continue with Depakote same dose.  Also, continue to work

on her agitation and irritability and adjusting psychotropic medications.





DD: 03/09/2017 11:47

DT: 03/10/2017 01:57

JOB# [id number]  [id number]

## 2017-03-10 NOTE — DISCHARGE SUMMARY
PATIENT'S AGE:  71/



SEX:  Female.



FINAL DIAGNOSES/PRIMARY DIAGNOSIS:  Unspecified psychosis.



REASON FOR HOSPITALIZATION:  The patient was admitted to the hospital because of

increased yelling and screaming and agitation and unable to follow any of staff

directions.



HOSPITAL COURSE:  The patient continued to be agitated and in irritable mood. 

The patient was started on Seroquel and the dose adjusted to 12.5 mg 3 times a

day and 50 mg at bedtime.  The patient continued to be confused and sedated and

the dose of Seroquel was decreased and then increased.  Finally, the patient was

calmer and it was easier to redirect her.  Also, decreased agitation and

decreased yelling and screaming and the patient was discharged from the

hospital.



The patient had no major medical problems while in the Psychiatric Unit.



AFTER DISCHARGE PLANS:  The patient discharged from the hospital and returned to

the nursing home with plan to continue her treatment as an outpatient.



EXPECTED OUTCOME AFTER DISCHARGE:  Guarded, unless the patient's medications

continued to be monitored and adjusted as per ____ as well as continue to work

on her behavior modification.





DD: 03/10/2017 05:33

DT: 03/10/2017 06:37

JOB# [id number]  [id number]

## 2019-06-12 ENCOUNTER — HOSPITAL ENCOUNTER (INPATIENT)
Dept: HOSPITAL 36 - ER | Age: 74
LOS: 3 days | Discharge: PSYCHIATRIC OR SUBSTANCE USE FACILITY | DRG: 689 | End: 2019-06-15
Attending: INTERNAL MEDICINE | Admitting: INTERNAL MEDICINE
Payer: MEDICARE

## 2019-06-12 VITALS — SYSTOLIC BLOOD PRESSURE: 116 MMHG | DIASTOLIC BLOOD PRESSURE: 64 MMHG

## 2019-06-12 DIAGNOSIS — J45.909: ICD-10-CM

## 2019-06-12 DIAGNOSIS — K56.41: ICD-10-CM

## 2019-06-12 DIAGNOSIS — F03.90: ICD-10-CM

## 2019-06-12 DIAGNOSIS — F25.9: ICD-10-CM

## 2019-06-12 DIAGNOSIS — D72.829: ICD-10-CM

## 2019-06-12 DIAGNOSIS — M19.90: ICD-10-CM

## 2019-06-12 DIAGNOSIS — N39.0: Primary | ICD-10-CM

## 2019-06-12 DIAGNOSIS — E03.9: ICD-10-CM

## 2019-06-12 DIAGNOSIS — E88.09: ICD-10-CM

## 2019-06-12 DIAGNOSIS — R53.2: ICD-10-CM

## 2019-06-12 DIAGNOSIS — F29: ICD-10-CM

## 2019-06-12 LAB
ALBUMIN SERPL-MCNC: 3.5 GM/DL (ref 3.7–5.3)
ALBUMIN/GLOB SERPL: 1 {RATIO} (ref 1–1.8)
ALP SERPL-CCNC: 54 U/L (ref 34–104)
ALT SERPL-CCNC: 4 U/L (ref 7–52)
AMYLASE SERPL-CCNC: 42 U/L (ref 29–103)
ANION GAP SERPL CALC-SCNC: 15.8 MMOL/L (ref 7–16)
APPEARANCE UR: (no result)
AST SERPL-CCNC: 12 U/L (ref 13–39)
BACTERIA #/AREA URNS HPF: (no result) /HPF
BILIRUB SERPL-MCNC: 0.4 MG/DL (ref 0.3–1)
BILIRUB UR-MCNC: NEGATIVE MG/DL
BUN SERPL-MCNC: 20 MG/DL (ref 7–25)
CALCIUM SERPL-MCNC: 9.7 MG/DL (ref 8.6–10.3)
CHLORIDE SERPL-SCNC: 103 MEQ/L (ref 98–107)
CO2 SERPL-SCNC: 26.8 MEQ/L (ref 21–31)
COLOR UR: YELLOW
CREAT SERPL-MCNC: 0.8 MG/DL (ref 0.6–1.2)
EPI CELLS URNS QL MICRO: (no result) /LPF
ERYTHROCYTE [DISTWIDTH] IN BLOOD BY AUTOMATED COUNT: 14.1 % (ref 11.5–20)
GLOBULIN SER-MCNC: 3.5 GM/DL
GLUCOSE SERPL-MCNC: 117 MG/DL (ref 70–105)
GLUCOSE UR STRIP-MCNC: NEGATIVE MG/DL
HCT VFR BLD CALC: 37.5 % (ref 41–60)
HGB BLD-MCNC: 12.4 GM/DL (ref 12–16)
INR PPP: 1.01 (ref 0.5–1.4)
KETONES UR STRIP-MCNC: NEGATIVE MG/DL
LEUKOCYTE ESTERASE UR-ACNC: (no result)
LIPASE SERPL-CCNC: 10 U/L (ref 11–82)
LYMPHOCYTES # BLD MANUAL: 10 % (ref 20–50)
MCH RBC QN AUTO: 29.2 PG (ref 27–31)
MCHC RBC AUTO-ENTMCNC: 33.1 PG (ref 28–36)
MCV RBC AUTO: 88.3 FL (ref 81–100)
MICRO URNS: YES
MONOCYTES # BLD MANUAL: 8 % (ref 2–10)
NEUTROPHILS NFR BLD AUTO: 80 % (ref 40–80)
NEUTS BAND NFR BLD: 2 % (ref 0–10)
NITRITE UR QL STRIP: NEGATIVE
PH UR STRIP: 6 [PH] (ref 4.6–8)
PLATELET # BLD: 295 TH/CMM (ref 150–400)
POTASSIUM SERPL-SCNC: 4.6 MEQ/L (ref 3.5–5.1)
PROT UR STRIP-MCNC: >300 MG/DL
RBC # BLD AUTO: 4.25 MIL/CMM (ref 3.8–5.2)
RBC # UR STRIP: (no result) /UL
RBC #/AREA URNS HPF: (no result) /HPF (ref 0–5)
REDUCING SUBS UR QL: NEGATIVE MG/DL
SODIUM SERPL-SCNC: 141 MEQ/L (ref 136–145)
URINALYSIS COMPLETE PNL UR: (no result)
UROBILINOGEN UR STRIP-ACNC: 0.2 E.U./DL (ref 0.2–1)
WBC # BLD AUTO: 12 TH/CMM (ref 4.8–10.8)
WBC #/AREA URNS HPF: (no result) /HPF (ref 0–5)

## 2019-06-12 PROCEDURE — Z7610: HCPCS

## 2019-06-12 RX ADMIN — ALBUTEROL SULFATE SCH: 2 SYRUP ORAL at 17:58

## 2019-06-12 RX ADMIN — DEXTROSE AND SODIUM CHLORIDE SCH MLS/HR: 5; .45 INJECTION, SOLUTION INTRAVENOUS at 15:09

## 2019-06-12 NOTE — DIAGNOSTIC IMAGING REPORT
CHEST X-RAY: AP view



INDICATION: Shortness of breath



COMPARISON: 6/1/2019



FINDINGS: There is mild elevation of the right hemidiaphragm.  The prior

NG tube and right PICC line have been removed.  Mild increased right

basal lung markings are noted.  There may be small left effusion. 

Cardiomegaly is noted atherosclerosis.  Deformity and advanced

degenerative changes of left shoulder are noted.



IMPRESSION:



Right basal atelectasis versus less likely infiltrate.



Left basal density, small left effusion cannot be excluded.



Mild cardiomegaly with atherosclerosis.

## 2019-06-12 NOTE — HISTORY & PHYSICAL
ADMIT DATE:  06/12/2019



INTERNAL MEDICINE HISTORY AND PHYSICAL



CHIEF COMPLAINT:  Vomiting.



HISTORY OF PRESENT ILLNESS:  This is a 73-year-old female with history of

dementia, hypothyroidism, asthma, DJD, psych disorder, admitted from a facility

secondary to vomiting ____.  The patient is agitated, screaming, yelling

constantly.  The patient while in the ER with possible UTI and pneumonia.  The

patient is admitted for further management.



PAST MEDICAL HISTORY:  As mentioned in the history of present illness.



PAST SURGICAL HISTORY:  Unable to obtain from the patient.



ALLERGIES:  No known drug allergies.



MEDICATIONS:  The patient is on multiple medications, including Plavix, Cipro,

lorazepam, Bactrim, albuterol, vitamin C, betamethasone, Plavix, digoxin,

Depakote, Haldol, midodrine, Promethazine, Seroquel.



FAMILY HISTORY:  Noncontributory.



SOCIAL HISTORY:  The patient is a nursing home patient, requiring a 24-hour

total care.



REVIEW OF SYSTEMS:  This is limited secondary to the patient's current mental

state.  We will try to obtain more detailed review of systems at a later date by

talking to family members.  There is a brother, ____.  We will also try to get

information from nursing staff at [facility] and [facility] #____ as well as from

Dr. Torres who normally follows the patient.



PHYSICAL EXAMINATION:

VITAL SIGNS:  Blood pressure 178/61, respirations 16, pulse 77, and temperature

98.2.

GENERAL:  Elderly female, appears chronically ill.

NECK:  Supple.  No mass.

LUNGS:  Equal breath sounds with a few rhonchi.

HEART:  Regular rate and rhythm.  Systolic ejection murmur.

ABDOMEN:  Soft, globular.

EXTREMITIES:  Positive excoriation.

NEUROLOGIC:  Limited positive contractures.



LABORATORY DATA:  WBC 12, hemoglobin 12, platelets 295.  INR 1.1.  Sodium 141,

potassium 4.6, BUN 24, creatinine 0.8, blood sugar 117, AST and ALT 12 and 4,

albumin 3.5.  UA - 10 wbc's, positive bacteria.



ASSESSMENT AND PLAN:  Urinary tract infection, possible pneumonia, fecal

impaction, low albumin, schizoaffective disorder, leukocytosis, dementia,

hypothyroidism, asthma, degenerative joint disease.  We will continue the

patient on oxygen and bronchodilator treatments.  We will start the patient on

gentle IV hydration as well as IV antibiotic.  We will review the patient's

chest x-ray and will review the patient's urine culture.  We will refer the

patient to Psychiatry as well, adjust the patient's psychotropic medication.  We

will continue monitoring the patient closely.  We will admit the patient to

med-surg.





DD: 06/12/2019 14:24

DT: 06/12/2019 15:31

[facility]# [id number]  [id number]

## 2019-06-12 NOTE — DIAGNOSTIC IMAGING REPORT
KUB single view



HISTORY: emesis



COMPARISON: None



FINDINGS: There is marked distal fecal impaction.

Gas-filled loops of bowel are noted.  Degenerative changes of the spine

are noted.



IMPRESSION:



Marked distal fecal impaction noted.  There is generalized gaseous

distended loops of bowel may be secondary to ileus or constipation from

patient's distal fecal impaction.  Clinical correlation and follow-up is

needed.

## 2019-06-12 NOTE — ED PHYSICIAN CHART
ED Chief Complaint/HPI





- Patient Information


Date Seen:: 06/12/19


Time Seen:: 09:12


Chief Complaint:: Vomiting


History of Present Illness:: 


72 yo female who is a assisted living resident with history dementia, 

schizophrenia with aggressive paranoid, was brought to ER for evaluation of 

emesis 3 times within 1 hour this morning. Pt appeared to be agitated, 

screaming and yelling constantly. 


 


Allergies:: 


 Allergies











Allergy/AdvReac Type Severity Reaction Status Date / Time


 


No Known Allergies Allergy   Unverified 06/01/16 10:16











Vitals:: 


 Vital Signs - 8 hr











  06/12/19





  09:02


 


Temp 98.2 F


 





 


RR 16


 


/65


 


O2 Sat % 95














ED Review of Systems





- Review of Systems


General/Constitutional: No fever, No chills


Skin: No rash


Head: No headache


Eyes: No pain


ENT: No nasal drainage


Neck: No neck pain


Cardio Vascular: No chest pain


Pulmonary: No SOB


GI: Nausea, Vomiting


G/U: Frequency


Musculoskeletal: No bone or joint pain


Psychiatric: Anxiety


Neurological: No focal symptoms





ED Past Medical History





- Past Medical History


Past Medical History: Other (Dementia)


Social History: Non Smoker, No Alcohol, No Drug Use


Psychiatricy History: Schizophrenia (aggressive paranoid)





Family Medical History





- Family Member


  ** Mother


History Unknown: Yes


Ethnicity: Non-


Living Status: Unknown





ED Physical Exam





- Physical Examination


General/Constitutional: Awake, Alert


Other Gen/Cons comments:: 


Agitated


Head: Atraumatic


Eyes: PERRL, EOMI


Skin: No skin lesions


ENMT: Nasal exam nl


Neck: No nuchal rigidity


Cardio Vascular: RRR, No murmur, gallop, rubs, NL S1 S2


Other Extremities comments:: 


Resting tremor of bilateral upper extremity


Neuro/Psych: Alert/oriented (to name only)





ED Labs/Radiology/EKG Results





- Lab Results


Results: 





 Laboratory Last Values











WBC  12.0 Th/cmm (4.8-10.8)  H  06/12/19  09:40    


 


RBC  4.25 Mil/cmm (3.80-5.20)   06/12/19  09:40    


 


Hgb  12.4 gm/dL (12-16)   06/12/19  09:40    


 


Hct  37.5 % (41.0-60)  L  06/12/19  09:40    


 


MCV  88.3 fl ()   06/12/19  09:40    


 


MCH  29.2 pg (27.0-31.0)   06/12/19  09:40    


 


MCHC Differential  33.1 pg (28.0-36.0)   06/12/19  09:40    


 


RDW  14.1 % (11.5-20.0)   06/12/19  09:40    


 


Plt Count  295 Th/cmm (150-400)   06/12/19  09:40    


 


MPV  10.1 fl  06/12/19  09:40    


 


Add Manual Diff  YES   06/12/19  09:40    


 


Band Neutrophils %  2 % (0-10)   06/12/19  09:40    


 


Neutrophils (Manual)  80 % (40-80)   06/12/19  09:40    


 


Lymphocytes  10 % (20-50)  L  06/12/19  09:40    


 


Monocytes  8 % (2-10)   06/12/19  09:40    


 


PT  10.5 SECONDS (9.5-11.5)   06/12/19  09:40    


 


INR  1.01  (0.5-1.4)   06/12/19  09:40    


 


PTT (Actin FS)  25.3 SECONDS (26.0-38.0)  L  06/12/19  09:40    


 


Sodium  141 mEq/L (136-145)   06/12/19  09:40    


 


Potassium  4.6 mEq/L (3.5-5.1)   06/12/19  09:40    


 


Chloride  103 mEq/L ()   06/12/19  09:40    


 


Carbon Dioxide  26.8 mEq/L (21.0-31.0)   06/12/19  09:40    


 


Anion Gap  15.8  (7.0-16.0)   06/12/19  09:40    


 


BUN  20 mg/dL (7-25)   06/12/19  09:40    


 


Creatinine  0.8 mg/dL (0.6-1.2)   06/12/19  09:40    


 


Est GFR ( Amer)  TNP   06/12/19  09:40    


 


Est GFR (Non-Af Amer)  TNP   06/12/19  09:40    


 


BUN/Creatinine Ratio  25.0   06/12/19  09:40    


 


Glucose  117 mg/dL ()  H  06/12/19  09:40    


 


Calcium  9.7 mg/dL (8.6-10.3)   06/12/19  09:40    


 


Total Bilirubin  0.4 mg/dL (0.3-1.0)   06/12/19  09:40    


 


AST  12 U/L (13-39)  L  06/12/19  09:40    


 


ALT  4 U/L (7-52)  L  06/12/19  09:40    


 


Alkaline Phosphatase  54 U/L ()   06/12/19  09:40    


 


Troponin I  0.01 ng/mL (0.01-0.05)   06/12/19  09:40    


 


B-Natriuretic Peptide  140.0 pg/mL (5.0-100.0)  H  06/12/19  09:40    


 


Total Protein  7.0 gm/dL (6.0-8.3)   06/12/19  09:40    


 


Albumin  3.5 gm/dL (3.7-5.3)  L  06/12/19  09:40    


 


Globulin  3.5 gm/dL  06/12/19  09:40    


 


Albumin/Globulin Ratio  1.0  (1.0-1.8)   06/12/19  09:40    


 


Amylase  42 U/L ()   06/12/19  09:40    


 


Lipase  10 U/L (11-82)  L  06/12/19  09:40    


 


Urine Source  CATH   06/12/19  11:30    


 


Urine Color  YELLOW   06/12/19  11:30    


 


Urine Clarity  SLIGHT HAZY  (CLEAR)   06/12/19  11:30    


 


Urine pH  6.0  (4.6 - 8.0)   06/12/19  11:30    


 


Ur Specific Gravity  1.025  (1.005-1.030)   06/12/19  11:30    


 


Urine Protein  >300 mg/dL (NEGATIVE)  H  06/12/19  11:30    


 


Urine Glucose (UA)  NEGATIVE mg/dL (NEGATIVE)   06/12/19  11:30    


 


Urine Clinitest  NEGATIVE mg/dL (NEGATIVE)   06/12/19  11:30    


 


Urine Ketones  NEGATIVE mg/dL (NEGATIVE)   06/12/19  11:30    


 


Urine Blood  LARGE  (NEGATIVE)  H  06/12/19  11:30    


 


Urine Nitrate  NEGATIVE  (NEGATIVE)   06/12/19  11:30    


 


Urine Bilirubin  NEGATIVE  (NEGATIVE)   06/12/19  11:30    


 


Urine Urobilinogen  0.2 E.U./dL (0.2 - 1.0)   06/12/19  11:30    


 


Ur Leukocyte Esterase  TRACE  (NEGATIVE)  H  06/12/19  11:30    


 


Urine RBC  5-10 /hpf (0-5)  H  06/12/19  11:30    


 


Urine WBC  2-5 /hpf (0-5)   06/12/19  11:30    


 


Ur Epithelial Cells  OCCASIONAL /lpf (FEW)   06/12/19  11:30    


 


Urine Bacteria  FEW /hpf (NONE SEEN)   06/12/19  11:30    














- Radiology Results


Results: 


CXR: right basal atelectasis, left basal small effusion


KUB: marked distal fecal impaction, generalized gaseous distended bowel loops 

secondary to ileus vs constipation.





- EKG Interpretations


EKG Time:: 21:35


Rate & Rhythm: 90 bpm, Sinus rhythm


Intervals: , QTc 469


Comments:: 


Nonspecific ST changes





ED Assessment





- Assessment


General Assessment: 


Leukocytosis


Urinary tract infection


Macroalbuminuria


Dementia


Schizophrenia aggressive paranoid 


Parkinson's disease?





Assessment/Comments:: 


CBC, CMP, UA


CXR, KUB


NS 1L IV


Ceftriaxone IV


Ativan 1mg IV


                                                                               

                                                               





ED Septic Shock





- .


Is Septic Shock (SBP<90, OR Lactate>4 mmol\L) present?: No





- <6hrs of presentation:


Vital Signs: 


 Vital Signs - 8 hr











  06/12/19





  09:02


 


Temp 98.2 F


 





 


RR 16


 


/65


 


O2 Sat % 95














ED Reassessment (Disposition)





- Reassessment


Reassessment Condition:: Improved





- Patient Disposition


Discharge/Transfer:: Acute Care w/in this hosp


Admitting Medical Physician:: Barrett Martínez

## 2019-06-13 LAB — HBA1C BLD-MCNC: 5.2 % (ref 4.8–5.6)

## 2019-06-13 RX ADMIN — ANORECTAL OINTMENT SCH APPL: 15.7; .44; 24; 20.6 OINTMENT TOPICAL at 17:19

## 2019-06-13 RX ADMIN — ANORECTAL OINTMENT SCH APPL: 15.7; .44; 24; 20.6 OINTMENT TOPICAL at 10:36

## 2019-06-13 RX ADMIN — DEXTROSE AND SODIUM CHLORIDE SCH MLS/HR: 5; .45 INJECTION, SOLUTION INTRAVENOUS at 07:36

## 2019-06-13 RX ADMIN — ALBUTEROL SULFATE SCH: 2 SYRUP ORAL at 11:11

## 2019-06-13 RX ADMIN — POLYETHYLENE GLYCOL 3350 SCH GM: 17 POWDER, FOR SOLUTION ORAL at 17:15

## 2019-06-13 RX ADMIN — ANORECTAL OINTMENT SCH APPL: 15.7; .44; 24; 20.6 OINTMENT TOPICAL at 17:20

## 2019-06-13 RX ADMIN — ALBUTEROL SULFATE SCH: 2 SYRUP ORAL at 17:59

## 2019-06-13 NOTE — INTERNAL MEDICINE PROG NOTE
Internal Medicine Subjective





- Subjective


Patient seen and examined:: with staff, chart reviewed


Patient is:: asleep, non-verbal, non-interactive, eyes closed, in bed, agitated

, confused


Patient Complaints of:: constipation


Per staff patient has:: no adverse event, no episodes of fall, poor appetite, 

poor oral intake, agitated





Internal Medicine Objective





- Results


Result Diagrams: 


 06/12/19 09:40





 06/12/19 09:40


Recent Labs: 


 Laboratory Last Values











WBC  12.0 Th/cmm (4.8-10.8)  H  06/12/19  09:40    


 


RBC  4.25 Mil/cmm (3.80-5.20)   06/12/19  09:40    


 


Hgb  12.4 gm/dL (12-16)   06/12/19  09:40    


 


Hct  37.5 % (41.0-60)  L  06/12/19  09:40    


 


MCV  88.3 fl ()   06/12/19  09:40    


 


MCH  29.2 pg (27.0-31.0)   06/12/19  09:40    


 


MCHC Differential  33.1 pg (28.0-36.0)   06/12/19  09:40    


 


RDW  14.1 % (11.5-20.0)   06/12/19  09:40    


 


Plt Count  295 Th/cmm (150-400)   06/12/19  09:40    


 


MPV  10.1 fl  06/12/19  09:40    


 


Add Manual Diff  YES   06/12/19  09:40    


 


Band Neutrophils %  2 % (0-10)   06/12/19  09:40    


 


Neutrophils (Manual)  80 % (40-80)   06/12/19  09:40    


 


Lymphocytes  10 % (20-50)  L  06/12/19  09:40    


 


Monocytes  8 % (2-10)   06/12/19  09:40    


 


PT  10.5 SECONDS (9.5-11.5)   06/12/19  09:40    


 


INR  1.01  (0.5-1.4)   06/12/19  09:40    


 


PTT (Actin FS)  25.3 SECONDS (26.0-38.0)  L  06/12/19  09:40    


 


Sodium  141 mEq/L (136-145)   06/12/19  09:40    


 


Potassium  4.6 mEq/L (3.5-5.1)   06/12/19  09:40    


 


Chloride  103 mEq/L ()   06/12/19  09:40    


 


Carbon Dioxide  26.8 mEq/L (21.0-31.0)   06/12/19  09:40    


 


Anion Gap  15.8  (7.0-16.0)   06/12/19  09:40    


 


BUN  20 mg/dL (7-25)   06/12/19  09:40    


 


Creatinine  0.8 mg/dL (0.6-1.2)   06/12/19  09:40    


 


Est GFR ( Amer)  TNP   06/12/19  09:40    


 


Est GFR (Non-Af Amer)  TNP   06/12/19  09:40    


 


BUN/Creatinine Ratio  25.0   06/12/19  09:40    


 


Glucose  117 mg/dL ()  H  06/12/19  09:40    


 


POC Glucose  101 MG/DL (70 - 105)   06/12/19  14:49    


 


Calcium  9.7 mg/dL (8.6-10.3)   06/12/19  09:40    


 


Total Bilirubin  0.4 mg/dL (0.3-1.0)   06/12/19  09:40    


 


AST  12 U/L (13-39)  L  06/12/19  09:40    


 


ALT  4 U/L (7-52)  L  06/12/19  09:40    


 


Alkaline Phosphatase  54 U/L ()   06/12/19  09:40    


 


Troponin I  0.01 ng/mL (0.01-0.05)   06/12/19  09:40    


 


B-Natriuretic Peptide  140.0 pg/mL (5.0-100.0)  H  06/12/19  09:40    


 


Total Protein  7.0 gm/dL (6.0-8.3)   06/12/19  09:40    


 


Albumin  3.5 gm/dL (3.7-5.3)  L  06/12/19  09:40    


 


Globulin  3.5 gm/dL  06/12/19  09:40    


 


Albumin/Globulin Ratio  1.0  (1.0-1.8)   06/12/19  09:40    


 


Amylase  42 U/L ()   06/12/19  09:40    


 


Lipase  10 U/L (11-82)  L  06/12/19  09:40    


 


Urine Source  CATH   06/12/19  11:30    


 


Urine Color  YELLOW   06/12/19  11:30    


 


Urine Clarity  SLIGHT HAZY  (CLEAR)   06/12/19  11:30    


 


Urine pH  6.0  (4.6 - 8.0)   06/12/19  11:30    


 


Ur Specific Gravity  1.025  (1.005-1.030)   06/12/19  11:30    


 


Urine Protein  >300 mg/dL (NEGATIVE)  H  06/12/19  11:30    


 


Urine Glucose (UA)  NEGATIVE mg/dL (NEGATIVE)   06/12/19  11:30    


 


Urine Clinitest  NEGATIVE mg/dL (NEGATIVE)   06/12/19  11:30    


 


Urine Ketones  NEGATIVE mg/dL (NEGATIVE)   06/12/19  11:30    


 


Urine Blood  LARGE  (NEGATIVE)  H  06/12/19  11:30    


 


Urine Nitrate  NEGATIVE  (NEGATIVE)   06/12/19  11:30    


 


Urine Bilirubin  NEGATIVE  (NEGATIVE)   06/12/19  11:30    


 


Urine Urobilinogen  0.2 E.U./dL (0.2 - 1.0)   06/12/19  11:30    


 


Ur Leukocyte Esterase  TRACE  (NEGATIVE)  H  06/12/19  11:30    


 


Urine RBC  5-10 /hpf (0-5)  H  06/12/19  11:30    


 


Urine WBC  2-5 /hpf (0-5)   06/12/19  11:30    


 


Ur Epithelial Cells  OCCASIONAL /lpf (FEW)   06/12/19  11:30    


 


Urine Bacteria  FEW /hpf (NONE SEEN)   06/12/19  11:30    














- Physical Exam


Vitals and I&O: 


 Vital Signs











Temp  96.7 F   06/13/19 11:47


 


Pulse  61   06/13/19 11:47


 


Resp  18   06/13/19 11:47


 


BP  106/59   06/13/19 11:47


 


Pulse Ox  100   06/13/19 11:47








 Intake & Output











 06/12/19 06/13/19 06/13/19





 18:59 06:59 18:59


 


Intake Total 50 50 987


 


Output Total  3 


 


Balance 50 47 987


 


Weight (lbs) 62.596 kg 62.596 kg 


 


Intake:   


 


  Intake, IV Amount 50 50 987


 


    Cefepime 1 gm In Dextrose 50 50 





    5% 50 ml @ 100 mls/hr IV   





    Q12HR NOREEN Rx#:[id number]   


 


    D5-0.45NS 1,000 ml @ 60   987





    mls/hr IV .[id number] NOREEN Rx   





    #:[id number]   


 


Output:   


 


  Urine  3 


 


Other:   


 


  # Voids 2  


 


  # Bowel Movements 1 0 


 


  Weight Source Bedscale Bedscale 











Active Medications: 


Current Medications





Acetaminophen (Tylenol)  650 mg PO Q4H PRN


   PRN Reason: Pain Or Fever above 101


   Stop: 08/11/19 14:17


Albuterol Sulfate (Ventolin)  2 mg PO BID NOREEN


   Stop: 08/11/19 16:59


   Last Admin: 06/13/19 11:11 Dose:  Not Given


Ascorbic Acid (Vitamin C)  500 mg PO DAILY NOREEN


   Stop: 08/12/19 08:59


   Last Admin: 06/13/19 09:09 Dose:  500 mg


Betamethasone Dipropionate (Betamethasone Dip 0.05% Cream)  1 appl TP DAILY NOREEN


   Stop: 08/12/19 08:59


   Last Admin: 06/13/19 10:36 Dose:  1 appl


Calamine/Phenol (Calmoseptine)  1 appl TP BID NOREEN


   Stop: 08/11/19 16:59


   Last Admin: 06/13/19 10:36 Dose:  1 appl


Clopidogrel Bisulfate (Plavix)  75 mg PO DAILY NOREEN


   Stop: 08/12/19 08:59


   Last Admin: 06/13/19 09:09 Dose:  75 mg


Clotrimazole (Lotrimin 1% Cream)  1 appl TP DAILY NOREEN


   Stop: 08/12/19 08:59


   Last Admin: 06/13/19 10:36 Dose:  1 appl


Digoxin (Lanoxin)  0.125 mg PO DAILY NOREEN


   Stop: 08/12/19 08:59


   Last Admin: 06/13/19 09:07 Dose:  Not Given


Divalproex Sodium (Depakote Er)  500 mg PO BID@0900,2100 [facility]; Protocol


   Stop: 08/11/19 20:59


   Last Admin: 06/13/19 09:07 Dose:  500 mg


Donepezil HCl (Aricept)  5 mg PO HS [facility]


   Stop: 08/11/19 20:59


   Last Admin: 06/12/19 21:59 Dose:  5 mg


Haloperidol (Haldol)  1 mg PO BID PRN; Protocol


   PRN Reason: Agitation


   Stop: 08/11/19 14:14


Cefepime HCl 1 gm/ Dextrose  50 mls @ 100 mls/hr IV Q12HR NOREEN


   Stop: 08/11/19 14:29


   Last Admin: 06/13/19 09:07 Dose:  100 mls/hr


Dextrose/Sodium Chloride (D5-0.45ns)  1,000 mls @ 60 mls/hr IV .[id number] NOREEN


   Stop: 08/11/19 14:29


   Last Admin: 06/13/19 07:36 Dose:  60 mls/hr


Lorazepam (Ativan)  1 mg IVP Q4HR PRN; Protocol


   PRN Reason: Agitation


   Stop: 08/11/19 14:14


Midodrine (Proamatine)  5 mg PO Q12HR NOREEN


   Stop: 08/11/19 20:59


   Last Admin: 06/13/19 09:09 Dose:  5 mg


Multivitamins/Vitamin C (Theragran)  1 tab PO DAILY NOREEN


   Stop: 08/12/19 08:59


   Last Admin: 06/13/19 09:07 Dose:  1 tab


Ondansetron HCl (Zofran)  4 mg IV Q8H PRN


   PRN Reason: Nausea / Vomiting


   Stop: 08/11/19 14:17


Polyethylene Glycol (Miralax)  17 gm PO BID [facility]


   Stop: 08/12/19 16:59


Promethazine HCl/Dextromethorphan (Phenergan Dm 6.25/15mg-5 Ml)  5 ml PO TID PRN


   PRN Reason: CONGESTION/COUGH


   Stop: 08/11/19 14:14


Quetiapine Fumarate (Seroquel)  50 mg PO HS [facility]; Protocol


   Stop: 08/11/19 20:59


   Last Admin: 06/12/19 21:59 Dose:  50 mg


Quetiapine Fumarate (Seroquel)  12.5 mg PO TIDWM [facility]; Protocol


   Stop: 08/11/19 20:59


   Last Admin: 06/13/19 12:25 Dose:  12.5 mg


Zinc Sulfate (Zinc Sulfate)  220 mg PO DAILY NOREEN


   Stop: 08/12/19 08:59


   Last Admin: 06/13/19 09:09 Dose:  220 mg








General: demented


HEENT: NC/AT, PERRLA, EOMI


Neck: Supple, No JVD


Lungs: rales


Cardiovascular: RRR, Normal S1, Normal S2, with murmur


Abdomen: soft, thin, non-distended, positive bowel sound


Extremities: excoriation, contracture


Neurological: no change





- Procedures


Procedures: 


 Procedures











Procedure Code Date


 


CARDIOPULM RESUSCITA NOS 99.60 10/19/07


 


CL REDUC DISLOC-HAND/FNG 79.74 08/29/07


 


CLOSURE SKIN & SUBCUTANEOUS NEC 86.59 09/10/12


 


CONTINUOUS INVASIVE MECHANICAL VENTILATION <96 CONSEC HRS 96.71 10/19/07


 


DESTRUCT EXT EAR LES NEC 18.29 06/27/01


 


DIPHTHERIA TOXOID ADMIN 99.36 02/03/12


 


EGD BIOPSY SINGLE/MULTIPLE 52288 06/16/10


 


EGD CONTROL BLEEDING ANY 66370 09/03/06


 


ELECTROCARDIOGRAM 89.52 06/27/01


 


ELECTROCARDIOGRAM COMPLETE 49393 06/27/01


 


EMERGENCY DEPT VISIT 57881 02/23/12


 


EMERGENCY DEPT VISIT 79151 02/03/12


 


ENDOSCOP EXC OR DEST OF LESION OR TISSUE OF ESOPHA 42.33 09/03/06


 


ESOPHAGOGASTRODUODENOSCOPY [EGD] W/CLOSED BIOPSY 45.16 06/16/10


 


EXC FACE-MM B9+ALBERTO 2.1-3 CM 88141 06/27/01


 


FREEING OF BOWEL ADHESION 96996 06/19/08


 


HEART/LUNG RESUSCITATION CPR 69755 10/19/07


 


HERNIA REPAIR W/MESH 02408 02/23/12


 


IMMUNIZATION ADMIN 87315 02/03/12


 


INJECT/INFUSE NEC 99.29 04/18/13


 


INSERT EMERGENCY AIRWAY 75821 10/19/07


 


INSERT ENDOTRACHEAL TUBE 96.04 10/19/07


 


INSERTION OF INFUSION DEV INTO R SUBCLAV VEIN, PERC APPROACH [id number] 05/28/16


 


INSJ PICC 5 YR+ W/O IMAGING 88933 05/28/16


 


OP RED-INT FIX TIB/FIBUL 79.36 11/24/98


 


OTH LYSIS-PERITONEAL ADHES 54.59 02/23/12


 


OTHER C.A.T. SCAN 88.38 11/03/98


 


OTHER OPEN INCISIONAL HERNIA REPAIR WITH GRAFT OR PROSTHESIS 53.61 02/23/12


 


PART SM BOWEL RESECT NEC 45.62 06/19/08


 


PERMANENT ILEOSTOMY NEC 46.23 10/19/07


 


RADIOGRAPHIC PROCEDURE 30386 11/03/98


 


REMOVAL OF COLON 22340 10/19/07


 


REMOVAL OF SPLEEN TOTAL 71677 10/19/07


 


REPAIR BOWEL OPENING 76611 06/19/08


 


RPR S/N/AX/GEN/TRNK 2.5CM/< 26531 02/23/12


 


RPR S/N/AX/GEN/TRNK2.6-7.5CM 07623 09/10/12


 


RPR VENTRAL ALLISON INIT REDUC 59819 02/23/12


 


SM BOWEL STOMA CLOSURE 46.51 06/19/08


 


TD VACCINE > 7 IM 94501 02/03/12


 


TETANUS TOXOID ADMINIST 99.38 02/03/12


 


THER/PROPH/DIAG INJ IV PUSH 87657 04/18/13


 


TOT INTRA-ABD COLECTOMY 45.8 10/19/07


 


TOTAL SPLENECTOMY 41.5 10/19/07


 


TREAT FINGER DISLOCATION 57433 08/29/07


 


TREATMENT OF ANKLE FRACTURE 77800 11/24/98


 


ULTRASONOGRAPHY OF RIGHT SUBCLAVIAN VEIN, GUIDANCE [id number] 05/28/16


 


VENOUS CATHETERIZATION NEC 38.93 09/03/06














Internal Medicine Assmt/Plan





- Assessment


Assessment: 





ASSESSMENT AND PLAN:  Urinary tract infection, possible pneumonia, fecal


impaction, low albumin, schizoaffective disorder, leukocytosis, dementia,


hypothyroidism, asthma, degenerative joint disease.  





- Plan


Plan: 





PLAN:   We will continue the


patient on oxygen and bronchodilator treatments.  We will start the patient on


gentle IV hydration as well as IV antibiotic.  We will review the patient's


chest x-ray and will review the patient's urine culture.  We will refer the


patient to Psychiatry as well, adjust the patient's psychotropic medication.  We


will continue monitoring the patient closely.  We will admit the patient to


med-surg.

## 2019-06-14 LAB
AMMONIA BLD-SCNC: 60 UMOL/L (ref 16–53)
BASOPHILS # BLD AUTO: 0 TH/CUMM (ref 0–0.2)
BASOPHILS NFR BLD: 0 % (ref 0–3)
EOSINOPHIL # BLD AUTO: 0.3 TH/CMM (ref 0.1–0.4)
EOSINOPHIL NFR BLD: 5 % (ref 0–5)
ERYTHROCYTE [DISTWIDTH] IN BLOOD BY AUTOMATED COUNT: 14.2 % (ref 11.5–20)
HCT VFR BLD CALC: 32.9 % (ref 41–60)
HGB BLD-MCNC: 10.8 GM/DL (ref 12–16)
LYMPHOCYTE AB SER FC-ACNC: 1.3 TH/CMM (ref 1.5–3)
LYMPHOCYTES # BLD MANUAL: 24 % (ref 20–50)
MCH RBC QN AUTO: 28.9 PG (ref 27–31)
MCHC RBC AUTO-ENTMCNC: 32.9 PG (ref 28–36)
MCV RBC AUTO: 88 FL (ref 81–100)
MONOCYTES # BLD AUTO: 1.2 TH/CMM (ref 0.3–1)
MONOCYTES # BLD MANUAL: 16 % (ref 2–10)
NEUTROPHILS # BLD: 2.9 TH/CMM (ref 1.8–8)
NEUTROPHILS NFR BLD AUTO: 55 % (ref 40–80)
NEUTS BAND NFR BLD: 0 % (ref 0–10)
PLATELET # BLD: 246 TH/CMM (ref 150–400)
RBC # BLD AUTO: 3.74 MIL/CMM (ref 3.8–5.2)
WBC # BLD AUTO: 5.7 TH/CMM (ref 4.8–10.8)

## 2019-06-14 RX ADMIN — ALBUTEROL SULFATE SCH MG: 2 SYRUP ORAL at 16:04

## 2019-06-14 RX ADMIN — ANORECTAL OINTMENT SCH APPL: 15.7; .44; 24; 20.6 OINTMENT TOPICAL at 08:41

## 2019-06-14 RX ADMIN — LACTULOSE SCH GM: 20 SOLUTION ORAL at 16:04

## 2019-06-14 RX ADMIN — POLYETHYLENE GLYCOL 3350 SCH GM: 17 POWDER, FOR SOLUTION ORAL at 16:03

## 2019-06-14 RX ADMIN — ALBUTEROL SULFATE SCH MG: 2 SYRUP ORAL at 08:41

## 2019-06-14 RX ADMIN — ANORECTAL OINTMENT SCH APPL: 15.7; .44; 24; 20.6 OINTMENT TOPICAL at 16:03

## 2019-06-14 RX ADMIN — POLYETHYLENE GLYCOL 3350 SCH GM: 17 POWDER, FOR SOLUTION ORAL at 08:30

## 2019-06-14 NOTE — INTERNAL MEDICINE PROG NOTE
Internal Medicine Subjective





- Subjective


Patient seen and examined:: with staff, chart reviewed


Patient is:: asleep, non-verbal, non-interactive, eyes closed, in bed, agitated

, confused


Patient Complaints of:: constipation


Per staff patient has:: no adverse event, no episodes of fall, poor appetite, 

poor oral intake, agitated





Internal Medicine Objective





- Results


Result Diagrams: 


 06/14/19 05:55





 06/12/19 09:40


Recent Labs: 


 Laboratory Last Values











WBC  5.7 Th/cmm (4.8-10.8)   06/14/19  05:55    


 


RBC  3.74 Mil/cmm (3.80-5.20)  L  06/14/19  05:55    


 


Hgb  10.8 gm/dL (12-16)  L  06/14/19  05:55    


 


Hct  32.9 % (41.0-60)  L  06/14/19  05:55    


 


MCV  88.0 fl ()   06/14/19  05:55    


 


MCH  28.9 pg (27.0-31.0)   06/14/19  05:55    


 


MCHC Differential  32.9 pg (28.0-36.0)   06/14/19  05:55    


 


RDW  14.2 % (11.5-20.0)   06/14/19  05:55    


 


Plt Count  246 Th/cmm (150-400)   06/14/19  05:55    


 


MPV  9.9 fl  06/14/19  05:55    


 


Add Manual Diff  YES   06/14/19  05:55    


 


Band Neutrophils %  0 % (0-10)   06/14/19  05:55    


 


Neutrophils (Manual)  55 % (40-80)   06/14/19  05:55    


 


Lymphocytes  24 % (20-50)   06/14/19  05:55    


 


Monocytes  16 % (2-10)  H  06/14/19  05:55    


 


Eosinophils  5 % (0-5)   06/14/19  05:55    


 


Basophils  0 % (0-3)   06/14/19  05:55    


 


PT  10.5 SECONDS (9.5-11.5)   06/12/19  09:40    


 


INR  1.01  (0.5-1.4)   06/12/19  09:40    


 


PTT (Actin FS)  25.3 SECONDS (26.0-38.0)  L  06/12/19  09:40    


 


Sodium  141 mEq/L (136-145)   06/12/19  09:40    


 


Potassium  4.6 mEq/L (3.5-5.1)   06/12/19  09:40    


 


Chloride  103 mEq/L ()   06/12/19  09:40    


 


Carbon Dioxide  26.8 mEq/L (21.0-31.0)   06/12/19  09:40    


 


Anion Gap  15.8  (7.0-16.0)   06/12/19  09:40    


 


BUN  20 mg/dL (7-25)   06/12/19  09:40    


 


Creatinine  0.8 mg/dL (0.6-1.2)   06/12/19  09:40    


 


Est GFR ( Amer)  TNP   06/12/19  09:40    


 


Est GFR (Non-Af Amer)  TNP   06/12/19  09:40    


 


BUN/Creatinine Ratio  25.0   06/12/19  09:40    


 


Glucose  117 mg/dL ()  H  06/12/19  09:40    


 


POC Glucose  101 MG/DL (70 - 105)   06/12/19  14:49    


 


Calcium  9.7 mg/dL (8.6-10.3)   06/12/19  09:40    


 


Total Bilirubin  0.4 mg/dL (0.3-1.0)   06/12/19  09:40    


 


AST  12 U/L (13-39)  L  06/12/19  09:40    


 


ALT  4 U/L (7-52)  L  06/12/19  09:40    


 


Alkaline Phosphatase  54 U/L ()   06/12/19  09:40    


 


Ammonia  60 umol/L (16-53)  H  06/14/19  05:55    


 


Troponin I  0.01 ng/mL (0.01-0.05)   06/12/19  09:40    


 


B-Natriuretic Peptide  165.0 pg/mL (5.0-100.0)  H  06/14/19  05:55    


 


Total Protein  7.0 gm/dL (6.0-8.3)   06/12/19  09:40    


 


Albumin  3.5 gm/dL (3.7-5.3)  L  06/12/19  09:40    


 


Globulin  3.5 gm/dL  06/12/19  09:40    


 


Albumin/Globulin Ratio  1.0  (1.0-1.8)   06/12/19  09:40    


 


Amylase  42 U/L ()   06/12/19  09:40    


 


Lipase  10 U/L (11-82)  L  06/12/19  09:40    


 


TSH  8.71 uIU/ml (0.34-5.60)  H  06/14/19  05:55    


 


Urine Source  CATH   06/12/19  11:30    


 


Urine Color  YELLOW   06/12/19  11:30    


 


Urine Clarity  SLIGHT HAZY  (CLEAR)   06/12/19  11:30    


 


Urine pH  6.0  (4.6 - 8.0)   06/12/19  11:30    


 


Ur Specific Gravity  1.025  (1.005-1.030)   06/12/19  11:30    


 


Urine Protein  >300 mg/dL (NEGATIVE)  H  06/12/19  11:30    


 


Urine Glucose (UA)  NEGATIVE mg/dL (NEGATIVE)   06/12/19  11:30    


 


Urine Clinitest  NEGATIVE mg/dL (NEGATIVE)   06/12/19  11:30    


 


Urine Ketones  NEGATIVE mg/dL (NEGATIVE)   06/12/19  11:30    


 


Urine Blood  LARGE  (NEGATIVE)  H  06/12/19  11:30    


 


Urine Nitrate  NEGATIVE  (NEGATIVE)   06/12/19  11:30    


 


Urine Bilirubin  NEGATIVE  (NEGATIVE)   06/12/19  11:30    


 


Urine Urobilinogen  0.2 E.U./dL (0.2 - 1.0)   06/12/19  11:30    


 


Ur Leukocyte Esterase  TRACE  (NEGATIVE)  H  06/12/19  11:30    


 


Urine RBC  5-10 /hpf (0-5)  H  06/12/19  11:30    


 


Urine WBC  2-5 /hpf (0-5)   06/12/19  11:30    


 


Ur Epithelial Cells  OCCASIONAL /lpf (FEW)   06/12/19  11:30    


 


Urine Bacteria  FEW /hpf (NONE SEEN)   06/12/19  11:30    














- Physical Exam


Vitals and I&O: 


 Vital Signs











Temp  97.3 F   06/14/19 12:00


 


Pulse  62   06/14/19 12:00


 


Resp  18   06/14/19 12:00


 


BP  106/51   06/14/19 12:00


 


Pulse Ox  98   06/14/19 12:00








 Intake & Output











 06/13/19 06/14/19 06/14/19





 18:59 06:59 18:59


 


Intake Total 1757 50 


 


Balance 1757 50 


 


Weight (lbs) 62.596 kg  


 


Intake:   


 


  Intake, IV Amount 1037 50 


 


    Cefepime 1 gm In Dextrose 50 50 





    5% 50 ml @ 100 mls/hr IV   





    Q12HR [facility] Rx#:[id number]   


 


    D5-0.45NS 1,000 ml @ 60 987  





    mls/hr IV .[id number] [facility] Rx   





    #:[id number]   


 


  Oral 720  


 


Other:   


 


  # Voids 2  


 


  # Bowel Movements 1  


 


  Weight Source Bedscale  











Active Medications: 


Current Medications





Acetaminophen (Tylenol)  650 mg PO Q4H PRN


   PRN Reason: Pain Or Fever above 101


   Stop: 08/11/19 14:17


Albuterol Sulfate (Ventolin)  2 mg PO BID [facility]


   Stop: 08/11/19 16:59


   Last Admin: 06/14/19 08:41 Dose:  2 mg


Albuterol Sulfate (Albuterol 2.5mg/3ml Neb Ud)  2.5 mg HHN Q2HRT PRN


   PRN Reason: Shortness of Breath or Wheeze


   Stop: 08/12/19 12:46


Ascorbic Acid (Vitamin C)  500 mg PO DAILY [facility]


   Stop: 08/12/19 08:59


   Last Admin: 06/14/19 08:28 Dose:  500 mg


Betamethasone Dipropionate (Betamethasone Dip 0.05% Cream)  1 appl TP DAILY [facility]


   Stop: 08/12/19 08:59


   Last Admin: 06/14/19 08:41 Dose:  1 appl


Calamine/Phenol (Calmoseptine)  1 appl TP BID [facility]


   Stop: 08/11/19 16:59


   Last Admin: 06/14/19 08:41 Dose:  1 appl


Clopidogrel Bisulfate (Plavix)  75 mg PO DAILY [facility]


   Stop: 08/12/19 08:59


   Last Admin: 06/14/19 08:28 Dose:  75 mg


Clotrimazole (Lotrimin 1% Cream)  1 appl TP DAILY [facility]


   Stop: 08/12/19 08:59


   Last Admin: 06/14/19 08:41 Dose:  1 appl


Digoxin (Lanoxin)  0.125 mg PO DAILY [facility]


   Stop: 08/12/19 08:59


   Last Admin: 06/14/19 08:27 Dose:  Not Given


Divalproex Sodium (Depakote Er)  500 mg PO BID@0900,2100 [facility]; Protocol


   Stop: 08/11/19 20:59


   Last Admin: 06/14/19 08:28 Dose:  500 mg


Donepezil HCl (Aricept)  5 mg PO HS [facility]


   Stop: 08/11/19 20:59


   Last Admin: 06/13/19 21:25 Dose:  5 mg


Haloperidol (Haldol)  1 mg PO BID [facility]; Protocol


   PRN Reason: Agitation


   Stop: 08/11/19 14:14


Cefepime HCl 1 gm/ Dextrose  50 mls @ 100 mls/hr IV Q12HR [facility]


   Stop: 08/11/19 14:29


   Last Admin: 06/14/19 08:29 Dose:  100 mls/hr


Dextrose/Sodium Chloride (D5-0.45ns)  1,000 mls @ 60 mls/hr IV .[id number] [facility]


   Stop: 08/11/19 14:29


   Last Admin: 06/13/19 07:36 Dose:  60 mls/hr


Ipratropium Bromide (Atrovent Neb 0.5mg/2.5ml)  0.5 mg HHN Q2HRT PRN


   PRN Reason: Shortness of Breath or Wheeze


   Stop: 08/12/19 12:46


Lorazepam (Ativan)  1 mg IVP Q4HR PRN; Protocol


   PRN Reason: Agitation


   Stop: 08/11/19 14:14


   Last Admin: 06/13/19 18:31 Dose:  1 mg


Midodrine (Proamatine)  5 mg PO Q12HR [facility]


   Stop: 08/11/19 20:59


   Last Admin: 06/14/19 08:28 Dose:  5 mg


Multivitamins/Vitamin C (Theragran)  1 tab PO DAILY [facility]


   Stop: 08/12/19 08:59


   Last Admin: 06/14/19 08:28 Dose:  1 tab


Ondansetron HCl (Zofran)  4 mg IV Q8H PRN


   PRN Reason: Nausea / Vomiting


   Stop: 08/11/19 14:17


Polyethylene Glycol (Miralax)  17 gm PO BID [facility]


   Stop: 08/12/19 16:59


   Last Admin: 06/14/19 08:30 Dose:  17 gm


Promethazine HCl/Dextromethorphan (Phenergan Dm 6.25/15mg-5 Ml)  5 ml PO TID PRN


   PRN Reason: CONGESTION/COUGH


   Stop: 08/11/19 14:14


Quetiapine Fumarate (Seroquel)  50 mg PO HS [facility]; Protocol


   Stop: 08/11/19 20:59


   Last Admin: 06/13/19 21:25 Dose:  50 mg


Quetiapine Fumarate (Seroquel)  12.5 mg PO TIDWM [facility]; Protocol


   Stop: 08/11/19 20:59


   Last Admin: 06/14/19 11:33 Dose:  12.5 mg


Zinc Sulfate (Zinc Sulfate)  220 mg PO DAILY [facility]


   Stop: 08/12/19 08:59


   Last Admin: 06/14/19 08:27 Dose:  220 mg








General: demented


HEENT: NC/AT, PERRLA, EOMI


Neck: Supple, No JVD


Lungs: rales


Cardiovascular: RRR, Normal S1, Normal S2, with murmur


Abdomen: soft, thin, non-distended, positive bowel sound


Extremities: excoriation, contracture


Neurological: no change





- Procedures


Procedures: 


 Procedures











Procedure Code Date


 


CARDIOPULM RESUSCITA NOS 99.60 10/19/07


 


CL REDUC DISLOC-HAND/FNG 79.74 08/29/07


 


CLOSURE SKIN & SUBCUTANEOUS NEC 86.59 09/10/12


 


CONTINUOUS INVASIVE MECHANICAL VENTILATION <96 CONSEC HRS 96.71 10/19/07


 


DESTRUCT EXT EAR LES NEC 18.29 06/27/01


 


DIPHTHERIA TOXOID ADMIN 99.36 02/03/12


 


EGD BIOPSY SINGLE/MULTIPLE 67997 06/16/10


 


EGD CONTROL BLEEDING ANY 13771 09/03/06


 


ELECTROCARDIOGRAM 89.52 06/27/01


 


ELECTROCARDIOGRAM COMPLETE 29665 06/27/01


 


EMERGENCY DEPT VISIT 54143 02/23/12


 


EMERGENCY DEPT VISIT 75895 02/03/12


 


ENDOSCOP EXC OR DEST OF LESION OR TISSUE OF ESOPHA 42.33 09/03/06


 


ESOPHAGOGASTRODUODENOSCOPY [EGD] W/CLOSED BIOPSY 45.16 06/16/10


 


EXC FACE-MM B9+ALBERTO 2.1-3 CM 20652 06/27/01


 


FREEING OF BOWEL ADHESION 37979 06/19/08


 


HEART/LUNG RESUSCITATION CPR 53615 10/19/07


 


HERNIA REPAIR W/MESH 66406 02/23/12


 


IMMUNIZATION ADMIN 75533 02/03/12


 


INJECT/INFUSE NEC 99.29 04/18/13


 


INSERT EMERGENCY AIRWAY 22545 10/19/07


 


INSERT ENDOTRACHEAL TUBE 96.04 10/19/07


 


INSERTION OF INFUSION DEV INTO R SUBCLAV VEIN, PERC APPROACH [id number] 05/28/16


 


INSJ PICC 5 YR+ W/O IMAGING 55945 05/28/16


 


OP RED-INT FIX TIB/FIBUL 79.36 11/24/98


 


OT LYSIS-PERITONEAL ADHES 54.59 02/23/12


 


OTHER C.A.T. SCAN 88.38 11/03/98


 


OTHER OPEN INCISIONAL HERNIA REPAIR WITH GRAFT OR PROSTHESIS 53.61 02/23/12


 


PART SM BOWEL RESECT NEC 45.62 06/19/08


 


PERMANENT ILEOSTOMY NEC 46.23 10/19/07


 


RADIOGRAPHIC PROCEDURE 66791 11/03/98


 


REMOVAL OF COLON 12428 10/19/07


 


REMOVAL OF SPLEEN TOTAL 00153 10/19/07


 


REPAIR BOWEL OPENING 00728 06/19/08


 


RPR S/N/AX/GEN/TRNK 2.5CM/< 29582 02/23/12


 


RPR S/N/AX/GEN/TRNK2.6-7.5CM 51132 09/10/12


 


RPR VENTRAL ALLISON INIT REDUC 03746 02/23/12


 


SM BOWEL STOMA CLOSURE 46.51 06/19/08


 


TD VACCINE > 7 IM 53550 02/03/12


 


TETANUS TOXOID ADMINIST 99.38 02/03/12


 


THER/PROPH/DIAG INJ IV PUSH 05401 04/18/13


 


TOT INTRA-ABD COLECTOMY 45.8 10/19/07


 


TOTAL SPLENECTOMY 41.5 10/19/07


 


TREAT FINGER DISLOCATION 69583 08/29/07


 


TREATMENT OF ANKLE FRACTURE 73104 11/24/98


 


ULTRASONOGRAPHY OF RIGHT SUBCLAVIAN VEIN, GUIDANCE [id number] 05/28/16


 


VENOUS CATHETERIZATION NEC 38.93 09/03/06














Internal Medicine Assmt/Plan





- Assessment


Assessment: 





ASSESSMENT AND PLAN:  Urinary tract infection, possible pneumonia, fecal


impaction, low albumin, schizoaffective disorder, leukocytosis, dementia,


hypothyroidism, asthma, degenerative joint disease.  


elevated ammonia





- Plan


Plan: 





PLAN:   We will continue the


patient on oxygen and bronchodilator treatments.  We will start the patient on


gentle IV hydration as well as IV antibiotic.  We will review the patient's


chest x-ray and will review the patient's urine culture.  We will refer the


patient to Psychiatry as well, adjust the patient's psychotropic medication.  We


will continue monitoring the patient closely.  We will admit the patient to


med-surg.


add lactulose

## 2019-06-14 NOTE — CONSULTATION
DATE OF CONSULTATION:  06/12/2019



AGE:  73.



SEX:  Female.



PHYSICIAN:  Dr. Martínez.



CONSULTANT:  Dr. Lopez.



TYPE OF THE REPORT:  Psychiatric Consult.



REASON FOR THE CONSULT:  Agitation.



HISTORY OF PRESENT ILLNESS:  The patient is a 73-year-old female who was

admitted to the hospital because of fecal impaction as well as because of

urinary tract infection.  The patient has been agitated and irritable and Dr. Martínez asked me to evaluate the patient.



Chart reviewed and the patient interviewed.  Also discussed the patient's

condition with the staff and reviewed the records and labs.  The patient is

agitated and she has her hands placed on mittens because of her agitation.  The

patient also has been restless and has been in irritable mood.  She also has not

been able to follow any directions.  The patient also has been easily angry, but

at the same time able to follow directions most of the time.



PAST PSYCHIATRIC HISTORY:  The patient has history of dementia.  The patient has

been taking Aricept 5 mg every day and also Haldol on a p.r.n. basis.  She also

has been taking Seroquel 12.5 mg 3 times a day and 50 mg at bedtime.



PAST MEDICAL HISTORY:  Admitted because of urinary tract infection and fecal

impaction.



SOCIAL HISTORY:  The patient lives in a nursing home.  No known alcohol or drug

use.



MENTAL STATUS EXAM:  The patient appears slightly older than her stated age. 

Irritable mood.  Restless.  Confused.  Angry.  Thought processes are

disorganized and unable to answer any of the questions coherently.  The patient

is staring at me and tries to answer questions, but not quite.  Easier to follow

directions during my interview.  The patient is alert and oriented to the

situation, but not to place, person or date.  Impaired immediate, recent and

remote memories and she was not able to tell me her birth date.  Poor insight

and judgment.



ASSESSMENT:

PRIMARY DIAGNOSIS:  Unspecified psychosis.



SECONDARY DIAGNOSIS:  Dementia, severe, with psychotic features.



TREATMENT PLAN:  We will monitor the patient's behavior closely.  We will start

individual as well as milieu psychotherapy.  We will monitor psychotropic

medications.



We will continue Seroquel and we will monitor any possible side effects.



Thanks Dr. Martínez and we will follow with you.





DD: 06/14/2019 07:35

DT: 06/14/2019 08:14

JOB# [id number]  [id number]

## 2019-06-15 ENCOUNTER — HOSPITAL ENCOUNTER (INPATIENT)
Dept: HOSPITAL 36 - GERO | Age: 74
LOS: 17 days | Discharge: NURSING, REHAB OR LONG-TERM CARE | DRG: 885 | End: 2019-07-02
Attending: PSYCHIATRY & NEUROLOGY | Admitting: PSYCHIATRY & NEUROLOGY
Payer: MEDICARE

## 2019-06-15 VITALS — SYSTOLIC BLOOD PRESSURE: 101 MMHG | DIASTOLIC BLOOD PRESSURE: 62 MMHG

## 2019-06-15 DIAGNOSIS — E03.9: ICD-10-CM

## 2019-06-15 DIAGNOSIS — J45.909: ICD-10-CM

## 2019-06-15 DIAGNOSIS — D72.829: ICD-10-CM

## 2019-06-15 DIAGNOSIS — K56.41: ICD-10-CM

## 2019-06-15 DIAGNOSIS — F29: ICD-10-CM

## 2019-06-15 DIAGNOSIS — F25.9: Primary | ICD-10-CM

## 2019-06-15 DIAGNOSIS — F03.91: ICD-10-CM

## 2019-06-15 DIAGNOSIS — M19.90: ICD-10-CM

## 2019-06-15 PROCEDURE — Z7610: HCPCS

## 2019-06-15 RX ADMIN — LACTULOSE SCH: 20 SOLUTION ORAL at 16:23

## 2019-06-15 RX ADMIN — ANORECTAL OINTMENT SCH APPL: 15.7; .44; 24; 20.6 OINTMENT TOPICAL at 08:51

## 2019-06-15 RX ADMIN — LACTULOSE SCH GM: 20 SOLUTION ORAL at 08:47

## 2019-06-15 RX ADMIN — ALBUTEROL SULFATE SCH: 2 SYRUP ORAL at 16:23

## 2019-06-15 RX ADMIN — POLYETHYLENE GLYCOL 3350 SCH GM: 17 POWDER, FOR SOLUTION ORAL at 08:50

## 2019-06-15 RX ADMIN — ALBUTEROL SULFATE SCH MG: 2 SYRUP ORAL at 08:48

## 2019-06-15 RX ADMIN — ANORECTAL OINTMENT SCH: 15.7; .44; 24; 20.6 OINTMENT TOPICAL at 16:23

## 2019-06-15 RX ADMIN — POLYETHYLENE GLYCOL 3350 SCH: 17 POWDER, FOR SOLUTION ORAL at 16:23

## 2019-06-15 NOTE — PROGRESS NOTES
DATE:  06/15/2019



SUBJECTIVE:  Chart was reviewed and the patient interviewed.  Also discussed the

patient's condition with the staff and reviewed records and labs.  The patient

is calm and cooperative and less agitated.  The patient also is compliant with

taking her medications with no side effects of her medications.  She still seems

to be in a depressed mood.  No major behavioral problems.



ASSESSMENT:  The patient is less agitated and less irritable.



TREATMENT PLAN:  Continue current medications.  Also, continue adjusting

psychotropic medications and working on behavioral modifications.





DD: 06/15/2019 08:54

DT: 06/15/2019 09:27

[facility]# [id number]  [id number]

## 2019-06-15 NOTE — DISCHARGE SUMMARY
DATE OF DISCHARGE:  06/15/2019



CHIEF COMPLAINT:  Vomiting.



FINAL DIAGNOSES:  Urinary tract infection, fecal impaction ____ worsening, psych

disorder, leukocytosis, dementia, hypothyroidism, asthma and DJD.



HISTORY:  This is a 73-year-old female with history of dementia.  There is some

asthma, DJD, psych disorder, admitted from nursing facility secondary to

vomiting.  The patient was agitated, diagnosed with UTI and admitted for

management.



PHYSICAL EXAMINATION:

VITAL SIGNS:  Blood pressure 144/74, respiration 19, pulse 81, and temperature

96.3.

GENERAL:  Elderly female, appears her stated age.

NECK:  Supple.  No mass.

LUNGS:  Equal breath sounds, few rhonchi.

HEART:  Regular rate and rhythm.

ABDOMEN:  Soft, nontender.

EXTREMITIES:  No clubbing, cyanosis or edema.



HOSPITAL COURSE:  The patient was admitted to medical floor, continue oxygen

treatment and IV hydration and IV antibiotic.  Urine culture did not show any

growth.  The patient was seen by Dr. Lopez of Psychiatry.  The patient remains

agitated, will be transferred to [facility].



CONDITION AT DISCHARGE:  Fair.



DISCHARGE INSTRUCTIONS:  The patient to be transferred to [facility].





DD: 06/15/2019 14:23

DT: 06/15/2019 14:56

JOB# [id number]  [id number]

## 2019-06-16 LAB
CHOLEST SERPL-MCNC: 158 MG/DL (ref ?–200)
HDLC SERPL-MCNC: 38 MG/DL (ref 23–92)
TRIGL SERPL-MCNC: 86 MG/DL (ref ?–150)

## 2019-06-16 RX ADMIN — ALBUTEROL SULFATE SCH: 2 SYRUP ORAL at 16:55

## 2019-06-16 RX ADMIN — LACTULOSE SCH GM: 20 SOLUTION ORAL at 09:42

## 2019-06-16 RX ADMIN — ALBUTEROL SULFATE SCH: 2 SYRUP ORAL at 16:54

## 2019-06-16 RX ADMIN — DIGOXIN SCH: 0.05 SOLUTION ORAL at 09:42

## 2019-06-16 RX ADMIN — LACTULOSE SCH: 20 SOLUTION ORAL at 16:55

## 2019-06-16 NOTE — INTERNAL MEDICINE PROG NOTE
Internal Medicine Subjective





- Subjective


Patient seen and examined:: with staff, chart reviewed


Patient is:: awake, verbal, non-interactive, agitated, confused


Per staff patient has:: no adverse event, no episodes of fall, poor appetite, 

agitated, combative





Internal Medicine Objective





- Results


Recent Labs: 


 Laboratory Last Values











Triglycerides  86 mg/dL (<150)   06/16/19  06:45    


 


Cholesterol  158 mg/dL (<200)   06/16/19  06:45    


 


LDL Cholesterol Direct  100 mg/dL ()   06/16/19  06:45    


 


HDL Cholesterol  38 mg/dL (23-92)   06/16/19  06:45    














- Physical Exam


Vitals and I&O: 


 Vital Signs











Temp  97.6 F   06/16/19 05:40


 


Pulse  68   06/16/19 05:40


 


Resp  16   06/16/19 05:40


 


BP  108/66   06/16/19 05:40


 


Pulse Ox  97   06/16/19 05:40








 Intake & Output











 06/15/19 06/16/19 06/16/19





 18:59 06:59 18:59


 


Other:   


 


  Stool Characteristics   Soft


 


  Weight Source Estimated  











Active Medications: 


Current Medications





Acetaminophen (Tylenol 650mg Supp)  650 mg RC Q4H PRN


   PRN Reason: Mild Pain/Headache/T above 101


   Stop: 08/14/19 20:14


Al Hydrox/Mg Hydrox/Simethicone (Maalox)  30 ml PO Q6H PRN


   PRN Reason: Dyspepsia


   Stop: 08/14/19 20:14


Albuterol Sulfate (Ventolin)  2 mg PO BID [facility]


   Stop: 08/15/19 08:59


Ascorbic Acid (Vitamin C)  500 mg PO DAILY [facility]


   Stop: 08/15/19 08:59


   Last Admin: 06/16/19 09:36 Dose:  500 mg


Betamethasone Dipropionate (Betamethasone Dip 0.05% Cream)  1 appl TP DAILY [facility]


   Stop: 08/15/19 08:59


Clopidogrel Bisulfate (Plavix)  75 mg PO DAILY [facility]


   Stop: 08/15/19 08:59


   Last Admin: 06/16/19 09:37 Dose:  75 mg


Digoxin (Lanoxin)  0.125 mg PO DAILY [facility]


   Stop: 08/15/19 08:59


   Last Admin: 06/16/19 09:42 Dose:  Not Given


Divalproex Sodium (Depakote Er)  500 mg PO Q12HR [facility]; Protocol


   Stop: 08/15/19 08:59


   Last Admin: 06/16/19 09:42 Dose:  500 mg


Donepezil HCl (Aricept)  5 mg PO HS [facility]


   Stop: 08/15/19 20:59


Haloperidol (Haldol)  1 mg PO BID PRN; Protocol


   PRN Reason: Agitation


   Stop: 08/15/19 08:59


Lactulose (Cephulac)  30 gm PO BID [facility]


   Stop: 08/15/19 08:59


   Last Admin: 06/16/19 09:42 Dose:  30 gm


Lorazepam (Ativan)  0.5 mg PO Q4HR PRN; Protocol


   PRN Reason: Anxiety/Agitation


   Stop: 07/15/19 20:14


   Last Admin: 06/16/19 11:22 Dose:  0.5 mg


Magnesium Hydroxide (Milk Of Magnesia)  30 ml PO HS PRN


   PRN Reason: Constipation


   Stop: 08/14/19 20:14


Midodrine (Proamatine)  5 mg PO Q12H [facility]


   Stop: 08/15/19 08:59


   Last Admin: 06/16/19 09:43 Dose:  5 mg


Multivitamins/Vitamin C (Theragran)  1 tab PO DAILY [facility]


   Stop: 08/15/19 08:59


   Last Admin: 06/16/19 09:44 Dose:  1 tab


Promethazine HCl/Dextromethorphan (Phenergan Dm 6.25/15mg-5 Ml)  5 ml PO TID PRN


   PRN Reason: Cough/ CONGESTION


   Stop: 08/15/19 01:44


Quetiapine Fumarate (Seroquel)  50 mg PO HS [facility]; Protocol


   Stop: 08/15/19 20:59


Quetiapine Fumarate (Seroquel)  25 mg PO TID [facility]; Protocol


   Stop: 08/15/19 08:59


   Last Admin: 06/16/19 09:44 Dose:  25 mg


Zinc Sulfate (Zinc Sulfate)  220 mg PO DAILY [facility]


   Stop: 08/15/19 08:59


   Last Admin: 06/16/19 09:44 Dose:  220 mg


Zolpidem Tartrate (Ambien)  5 mg PO HS PRN


   PRN Reason: Insomnia


   Stop: 08/14/19 20:14








General: demented


HEENT: NC/AT, PERRLA, EOMI


Neck: Supple, No JVD


Lungs: rales


Cardiovascular: RRR, Normal S1, Normal S2


Abdomen: soft, non-tender, non-distended, positive bowel sound


Extremities: excoriation


Neurological: no change





- Procedures


Procedures: 


 Procedures











Procedure Code Date


 


CARDIOPULM RESUSCITA NOS 99.60 10/19/07


 


CL REDUC DISLOC-HAND/FNG 79.74 08/29/07


 


CLOSURE SKIN & SUBCUTANEOUS NEC 86.59 09/10/12


 


CONTINUOUS INVASIVE MECHANICAL VENTILATION <96 CONSEC HRS 96.71 10/19/07


 


DESTRUCT EXT EAR LES NEC 18.29 06/27/01


 


DIPHTHERIA TOXOID ADMIN 99.36 02/03/12


 


EGD BIOPSY SINGLE/MULTIPLE 63243 06/16/10


 


EGD CONTROL BLEEDING ANY 12234 09/03/06


 


ELECTROCARDIOGRAM 89.52 06/27/01


 


ELECTROCARDIOGRAM COMPLETE 24802 06/27/01


 


EMERGENCY DEPT VISIT 72429 02/23/12


 


EMERGENCY DEPT VISIT 76257 02/03/12


 


ENDOSCOP EXC OR DEST OF LESION OR TISSUE OF ESOPHA 42.33 09/03/06


 


ESOPHAGOGASTRODUODENOSCOPY [EGD] W/CLOSED BIOPSY 45.16 06/16/10


 


EXC FACE-MM B9+ALBERTO 2.1-3 CM 84166 06/27/01


 


FREEING OF BOWEL ADHESION 64835 06/19/08


 


HEART/LUNG RESUSCITATION CPR 38409 10/19/07


 


HERNIA REPAIR W/MESH 67677 02/23/12


 


IMMUNIZATION ADMIN 15222 02/03/12


 


INJECT/INFUSE NEC 99.29 04/18/13


 


INSERT EMERGENCY AIRWAY 72597 10/19/07


 


INSERT ENDOTRACHEAL TUBE 96.04 10/19/07


 


INSERTION OF INFUSION DEV INTO R SUBCLAV VEIN, PERC APPROACH 51V147Y 05/28/16


 


INS PICC 5 YR+ W/O IMAGING 35403 05/28/16


 


OP RED-INT FIX TIB/FIBUL 79.36 11/24/98


 


OTH LYSIS-PERITONEAL ADHES 54.59 02/23/12


 


OTHER C.A.T. SCAN 88.38 11/03/98


 


OTHER OPEN INCISIONAL HERNIA REPAIR WITH GRAFT OR PROSTHESIS 53.61 02/23/12


 


PART SM BOWEL RESECT NEC 45.62 06/19/08


 


PERMANENT ILEOSTOMY NEC 46.23 10/19/07


 


RADIOGRAPHIC PROCEDURE 20731 11/03/98


 


REMOVAL OF COLON 35804 10/19/07


 


REMOVAL OF SPLEEN TOTAL 41258 10/19/07


 


REPAIR BOWEL OPENING 96609 06/19/08


 


RPR S/N/AX/GEN/TRNK 2.5CM/< 57317 02/23/12


 


RPR S/N/AX/GEN/TRNK2.6-7.5CM 03029 09/10/12


 


RPR VENTRAL ALLISON INIT REDUC 70211 02/23/12


 


SM BOWEL STOMA CLOSURE 46.51 06/19/08


 


TD VACCINE > 7 IM 64381 02/03/12


 


TETANUS TOXOID ADMINIST 99.38 02/03/12


 


THER/PROPH/DIAG INJ IV PUSH 76105 04/18/13


 


TOT INTRA-ABD COLECTOMY 45.8 10/19/07


 


TOTAL SPLENECTOMY 41.5 10/19/07


 


TREAT FINGER DISLOCATION 32747 08/29/07


 


TREATMENT OF ANKLE FRACTURE 43773 11/24/98


 


ULTRASONOGRAPHY OF RIGHT SUBCLAVIAN VEIN, GUIDANCE [id number] 05/28/16


 


VENOUS CATHETERIZATION NEC 38.93 09/03/06














Internal Medicine Assmt/Plan





- Assessment


Assessment: 





ASSESSMENT AND PLAN: agitationfecal


impaction, low albumin, schizoaffective disorder, leukocytosis, dementia,


hypothyroidism, asthma, degenerative joint disease.  


elevated ammonia











- Plan


Plan: 








- Plan


Plan: 





PLAN:   We will continue the


patient on oxygen and bronchodilator treatments.    We will review the patient's


chest x-ray and will review the patient's urine culture.  We will refer the


patient to Psychiatry as well, adjust the patient's psychotropic medication.  We


will continue monitoring the patient closely.  


add lactulose

## 2019-06-17 LAB — HBA1C BLD-MCNC: 5.1 % (ref 4.8–5.6)

## 2019-06-17 RX ADMIN — ALBUTEROL SULFATE SCH MG: 2 SYRUP ORAL at 16:07

## 2019-06-17 RX ADMIN — LACTULOSE SCH GM: 20 SOLUTION ORAL at 16:07

## 2019-06-17 RX ADMIN — DIGOXIN SCH MG: 0.05 SOLUTION ORAL at 08:28

## 2019-06-17 RX ADMIN — HALOPERIDOL LACTATE PRN MG: 2 SOLUTION, CONCENTRATE ORAL at 16:56

## 2019-06-17 RX ADMIN — LACTULOSE SCH GM: 20 SOLUTION ORAL at 08:28

## 2019-06-17 RX ADMIN — ALBUTEROL SULFATE SCH MG: 2 SYRUP ORAL at 09:00

## 2019-06-17 NOTE — PSYCHIATRIC EVALUATION
DATE OF SERVICE:  06/15/2019



[facility]



PATIENT'S AGE: 73



SEX:  Female.



PHYSICIAN:  Dr. Lopez.



CHIEF COMPLAINT:  Yelling and screaming and agitation.



HISTORY OF PRESENT ILLNESS:  The patient is a 73-year-old female who was

transferred from [facility] to the [facility] because of agitation,

yelling and screaming and inability to follow directions.  The patient was in

[facility] for treatment and the patient was basically trying to pull IVs

and easily agitated and yelling and screaming and uncooperative with the staff. 

The patient was medically cleared and [facility]  The patient has been agitated and irritable since her

admission.  She also has been in angry mood.  The patient also has difficulty

following any of staff directions and despite that the patient has been taking

Seroquel 12.5 mg 3 times a day.



PAST PSYCHIATRIC HISTORY:  The patient has a long history of dementia and

psychosis.



PAST MEDICAL HISTORY:  As per Dr. Martínez.



MENTAL STATUS EXAMINATION:  The patient appears her stated age.  Anxious. 

Irritable mood.  Thought processes are with poverty of speech and when she

answered, this is with anger.  The patient did not answer question regarding

hallucinations or delusions or regarding suicide or homicide.  The patient is

alert and oriented to situation, but not to place or person or date.  Impaired

immediate, recent and remote memories.  Poor insight and poor judgment.



ASSESSMENT:

PRIMARY DIAGNOSIS:  Unspecified psychosis.



SECONDARY DIAGNOSIS:  Dementia, moderate to severe, with psychosis and

behavioral disturbances.



TREATMENT PLAN:  Continue to monitor her behavior and her condition closely. 

Also, continue to work on her poor impulse control and her ineffective coping

and continue adjusting psychotropic medications.



ESTIMATED LENGTH OF STAY:  5-7 days.



THE PATIENT'S STRENGTHS AND WEAKNESSES:  The patient's strength is not clear at

this time.  Weaknesses, her poor impulse control and ineffective coping.



AFTER DISCHARGE PLAN:  Outpatient treatment and followup.  We will continue as

an outpatient.



CRITERIA FOR DISCHARGE:  The patient will not be as agitated or psychotic and

will stabilize psychotropic medications and will establish outpatient treatment

plans.





DD: 06/16/2019 17:42

DT: 06/17/2019 00:28

[facility]# [id number]  [id number]

## 2019-06-17 NOTE — INTERNAL MEDICINE PROG NOTE
Internal Medicine Subjective





- Subjective


Patient seen and examined:: with staff, chart reviewed


Patient is:: awake, verbal, non-interactive, agitated, confused


Per staff patient has:: no adverse event, no episodes of fall, poor appetite, 

agitated, combative





Internal Medicine Objective





- Results


Recent Labs: 


 Laboratory Last Values











Triglycerides  86 mg/dL (<150)   06/16/19  06:45    


 


Cholesterol  158 mg/dL (<200)   06/16/19  06:45    


 


LDL Cholesterol Direct  100 mg/dL ()   06/16/19  06:45    


 


HDL Cholesterol  38 mg/dL (23-92)   06/16/19  06:45    














- Physical Exam


Vitals and I&O: 


 Vital Signs











Temp  997.8 F   06/17/19 06:27


 


Pulse  69   06/17/19 08:28


 


Resp  16   06/17/19 08:00


 


BP  122/59   06/17/19 06:27


 


Pulse Ox  94   06/17/19 06:27








 Intake & Output











 06/16/19 06/17/19 06/17/19





 18:59 06:59 18:59


 


Intake Total  120 


 


Balance  120 


 


Intake:   


 


  Oral  120 


 


Other:   


 


  # Voids  3 


 


  # Bowel Movements  0 


 


  Stool Characteristics Soft Soft Soft











Active Medications: 


Current Medications





Acetaminophen (Tylenol 650mg Supp)  650 mg RC Q4H PRN


   PRN Reason: Mild Pain/Headache/T above 101


   Stop: 08/14/19 20:14


Al Hydrox/Mg Hydrox/Simethicone (Maalox)  30 ml PO Q6H PRN


   PRN Reason: Dyspepsia


   Stop: 08/14/19 20:14


Albuterol Sulfate (Ventolin)  2 mg PO BID [facility]


   Stop: 08/15/19 08:59


   Last Admin: 06/16/19 16:55 Dose:  Not Given


Ascorbic Acid (Vitamin C)  500 mg PO DAILY [facility]


   Stop: 08/15/19 08:59


   Last Admin: 06/16/19 09:36 Dose:  500 mg


Betamethasone Dipropionate (Betamethasone Dip 0.05% Cream)  1 appl TP DAILY [facility]


   Stop: 08/15/19 08:59


   Last Admin: 06/17/19 08:25 Dose:  1 appl


Clopidogrel Bisulfate (Plavix)  75 mg PO DAILY [facility]


   Stop: 08/15/19 08:59


   Last Admin: 06/17/19 08:25 Dose:  75 mg


Digoxin (Lanoxin)  0.125 mg PO DAILY [facility]


   Stop: 08/15/19 08:59


   Last Admin: 06/17/19 08:28 Dose:  0.125 mg


Divalproex Sodium (Depakote Er)  500 mg PO Q12HR [facility]; Protocol


   Stop: 08/15/19 08:59


   Last Admin: 06/17/19 08:28 Dose:  500 mg


Donepezil HCl (Aricept)  5 mg PO HS [facility]


   Stop: 08/15/19 20:59


   Last Admin: 06/16/19 21:15 Dose:  5 mg


Haloperidol Lactate (Haldol)  1 mg PO BID [facility]; Protocol


   PRN Reason: Agitation


   Stop: 08/15/19 08:59


Lactulose (Cephulac)  30 gm PO BID [facility]


   Stop: 08/15/19 08:59


   Last Admin: 06/17/19 08:28 Dose:  30 gm


Levofloxacin (Levaquin)  500 mg PO Q24H [facility]


   Stop: 06/18/19 08:59


   Last Admin: 06/17/19 08:29 Dose:  500 mg


Lorazepam (Ativan)  0.5 mg PO Q4HR PRN; Protocol


   PRN Reason: Anxiety/Agitation


   Stop: 07/15/19 20:14


   Last Admin: 06/17/19 08:42 Dose:  0.5 mg


Magnesium Hydroxide (Milk Of Magnesia)  30 ml PO HS PRN


   PRN Reason: Constipation


   Stop: 08/14/19 20:14


Midodrine (Proamatine)  5 mg PO Q12H [facility]


   Stop: 08/15/19 08:59


   Last Admin: 06/17/19 08:29 Dose:  5 mg


Multivitamins/Vitamin C (Theragran)  1 tab PO DAILY [facility]


   Stop: 08/15/19 08:59


   Last Admin: 06/17/19 08:42 Dose:  1 tab


Promethazine HCl/Dextromethorphan (Phenergan Dm 6.25/15mg-5 Ml)  5 ml PO TID PRN


   PRN Reason: Cough/ CONGESTION


   Stop: 08/15/19 01:44


Quetiapine Fumarate (Seroquel)  50 mg PO HS [facility]; Protocol


   Stop: 08/15/19 20:59


   Last Admin: 06/16/19 21:15 Dose:  50 mg


Quetiapine Fumarate (Seroquel)  25 mg PO TID [facility]; Protocol


   Stop: 08/15/19 08:59


   Last Admin: 06/17/19 09:00 Dose:  25 mg


Zinc Sulfate (Zinc Sulfate)  220 mg PO DAILY [facility]


   Stop: 08/15/19 08:59


   Last Admin: 06/17/19 08:42 Dose:  220 mg


Zolpidem Tartrate (Ambien)  5 mg PO HS PRN


   PRN Reason: Insomnia


   Stop: 08/14/19 20:14


   Last Admin: 06/16/19 21:18 Dose:  5 mg








General: demented


HEENT: NC/AT, PERRLA, EOMI


Neck: Supple, No JVD


Lungs: rales


Cardiovascular: RRR, Normal S1, Normal S2


Abdomen: soft, non-tender, non-distended, positive bowel sound


Extremities: excoriation


Neurological: no change





- Procedures


Procedures: 


 Procedures











Procedure Code Date


 


CARDIOPULM RESUSCITA NOS 99.60 10/19/07


 


CL REDUC DISLOC-HAND/FNG 79.74 08/29/07


 


CLOSURE SKIN & SUBCUTANEOUS NEC 86.59 09/10/12


 


CONTINUOUS INVASIVE MECHANICAL VENTILATION <96 CONSEC HRS 96.71 10/19/07


 


DESTRUCT EXT EAR LES NEC 18.29 06/27/01


 


DIPHTHERIA TOXOID ADMIN 99.36 02/03/12


 


EGD BIOPSY SINGLE/MULTIPLE 01881 06/16/10


 


EGD CONTROL BLEEDING ANY 86159 09/03/06


 


ELECTROCARDIOGRAM 89.52 06/27/01


 


ELECTROCARDIOGRAM COMPLETE 28334 06/27/01


 


EMERGENCY DEPT VISIT 99118 02/23/12


 


EMERGENCY DEPT VISIT 20108 02/03/12


 


ENDOSCOP EXC OR DEST OF LESION OR TISSUE OF ESOPHA 42.33 09/03/06


 


ESOPHAGOGASTRODUODENOSCOPY [EGD] W/CLOSED BIOPSY 45.16 06/16/10


 


EXC FACE-MM B9+ALBERTO 2.1-3 CM 01735 06/27/01


 


FREEING OF BOWEL ADHESION 05030 06/19/08


 


HEART/LUNG RESUSCITATION CPR 65552 10/19/07


 


HERNIA REPAIR W/MESH 16604 02/23/12


 


IMMUNIZATION ADMIN 63168 02/03/12


 


INJECT/INFUSE NEC 99.29 04/18/13


 


INSERT EMERGENCY AIRWAY 81944/07


 


INSERT ENDOTRACHEAL TUBE 96.04 10/19/07


 


INSERTION OF INFUSION DEV INTO R SUBCLAV VEIN, PERC APPROACH [id number] 05/28/16


 


INSJ PICC 5 YR+ W/O IMAGING 01849 05/28/16


 


OP RED-INT FIX TIB/FIBUL 79.36 11/24/98


 


OTH LYSIS-PERITONEAL ADHES 54.59 02/23/12


 


OTHER C.A.T. SCAN 88.38 11/03/98


 


OTHER OPEN INCISIONAL HERNIA REPAIR WITH GRAFT OR PROSTHESIS 53.61 02/23/12


 


PART SM BOWEL RESECT NEC 45.62 06/19/08


 


PERMANENT ILEOSTOMY NEC 46.23 10/19/07


 


RADIOGRAPHIC PROCEDURE 30754 11/03/98


 


REMOVAL OF COLON 33538 10/19/07


 


REMOVAL OF SPLEEN TOTAL 78707 10/19/07


 


REPAIR BOWEL OPENING 56301 06/19/08


 


RPR S/N/AX/GEN/TRNK 2.5CM/< 12894 02/23/12


 


RPR S/N/AX/GEN/TRNK2.6-7.5CM 89965 09/10/12


 


RPR VENTRAL ALLISON INIT REDUC 00867 02/23/12


 


SM BOWEL STOMA CLOSURE 46.51 06/19/08


 


TD VACCINE > 7 IM 07121 02/03/12


 


TETANUS TOXOID ADMINIST 99.38 02/03/12


 


THER/PROPH/DIAG INJ IV PUSH 08514 04/18/13


 


TOT INTRA-ABD COLECTOMY 45.8 10/19/07


 


TOTAL SPLENECTOMY 41.5 10/19/07


 


TREAT FINGER DISLOCATION 82957 08/29/07


 


TREATMENT OF ANKLE FRACTURE 53101 11/24/98


 


ULTRASONOGRAPHY OF RIGHT SUBCLAVIAN VEIN, GUIDANCE [id number] 05/28/16


 


VENOUS CATHETERIZATION NEC 38.93 09/03/06














Internal Medicine Assmt/Plan





- Assessment


Assessment: 





ASSESSMENT AND PLAN: agitation fecal


impaction, low albumin, schizoaffective disorder, leukocytosis, dementia,


hypothyroidism, asthma, degenerative joint disease.  


elevated ammonia pyuria











- Plan


Plan: 








- Plan


Plan: 





PLAN:   We will continue the


patient on oxygen and bronchodilator treatments.    We will review the patient's


chest x-ray and will review the patient's urine culture.  We will refer the


patient to Psychiatry as well, adjust the patient's psychotropic medication.  We


will continue monitoring the patient closely.  


add lactulose


on short course levaquin

## 2019-06-18 RX ADMIN — ALBUTEROL SULFATE SCH MG: 2 SYRUP ORAL at 09:10

## 2019-06-18 RX ADMIN — HALOPERIDOL LACTATE PRN MG: 2 SOLUTION, CONCENTRATE ORAL at 11:48

## 2019-06-18 RX ADMIN — ALBUTEROL SULFATE SCH MG: 2 SYRUP ORAL at 16:14

## 2019-06-18 RX ADMIN — LACTULOSE SCH: 20 SOLUTION ORAL at 16:14

## 2019-06-18 RX ADMIN — DIGOXIN SCH: 0.05 SOLUTION ORAL at 08:49

## 2019-06-18 RX ADMIN — LACTULOSE SCH: 20 SOLUTION ORAL at 08:50

## 2019-06-18 NOTE — INTERNAL MEDICINE PROG NOTE
Internal Medicine Subjective





- Subjective


Patient seen and examined:: with staff, chart reviewed


Patient is:: awake, verbal, non-interactive, agitated, confused


Per staff patient has:: no adverse event, no episodes of fall, poor appetite, 

agitated, combative





Internal Medicine Objective





- Results


Recent Labs: 


 Laboratory Last Values











Triglycerides  86 mg/dL (<150)   06/16/19  06:45    


 


Cholesterol  158 mg/dL (<200)   06/16/19  06:45    


 


LDL Cholesterol Direct  100 mg/dL ()   06/16/19  06:45    


 


HDL Cholesterol  38 mg/dL (23-92)   06/16/19  06:45    














- Physical Exam


Vitals and I&O: 


 Vital Signs











Temp  98.2 F   06/18/19 05:56


 


Pulse  60   06/18/19 08:49


 


Resp  19   06/18/19 08:00


 


BP  102/57   06/18/19 05:56


 


Pulse Ox  94   06/18/19 05:56








 Intake & Output











 06/17/19 06/18/19 06/18/19





 18:59 06:59 18:59


 


Intake Total 850 120 120


 


Balance 850 120 120


 


Intake:   


 


  Oral 850 120 120


 


Other:   


 


  # Voids 4 1 


 


  # Bowel Movements 2 2 


 


  Stool Characteristics Soft Soft Soft











Active Medications: 


Current Medications





Acetaminophen (Tylenol 650mg Supp)  650 mg RC Q4H PRN


   PRN Reason: Mild Pain/Headache/T above 101


   Stop: 08/14/19 20:14


Al Hydrox/Mg Hydrox/Simethicone (Maalox)  30 ml PO Q6H PRN


   PRN Reason: Dyspepsia


   Stop: 08/14/19 20:14


Albuterol Sulfate (Ventolin)  2 mg PO BID [facility]


   Stop: 08/15/19 08:59


   Last Admin: 06/18/19 09:10 Dose:  2 mg


Ascorbic Acid (Vitamin C)  500 mg PO DAILY [facility]


   Stop: 08/15/19 08:59


   Last Admin: 06/18/19 08:48 Dose:  500 mg


Betamethasone Dipropionate (Betamethasone Dip 0.05% Cream)  1 appl TP DAILY [facility]


   Stop: 08/15/19 08:59


   Last Admin: 06/18/19 08:48 Dose:  1 appl


Clopidogrel Bisulfate (Plavix)  75 mg PO DAILY [facility]


   Stop: 08/15/19 08:59


   Last Admin: 06/18/19 08:48 Dose:  75 mg


Digoxin (Lanoxin)  0.125 mg PO DAILY [facility]


   Stop: 08/15/19 08:59


   Last Admin: 06/18/19 08:49 Dose:  Not Given


Divalproex Sodium (Depakote Er)  500 mg PO Q12HR [facility]; Protocol


   Stop: 08/15/19 08:59


   Last Admin: 06/18/19 08:49 Dose:  500 mg


Donepezil HCl (Aricept)  5 mg PO HS [facility]


   Stop: 08/15/19 20:59


   Last Admin: 06/17/19 21:34 Dose:  5 mg


Haloperidol Lactate (Haldol)  1 mg PO BID PRN; Protocol


   PRN Reason: Agitation


   Stop: 08/15/19 08:59


   Last Admin: 06/18/19 11:48 Dose:  1 mg


Lactulose (Cephulac)  30 gm PO BID [facility]


   Stop: 08/15/19 08:59


   Last Admin: 06/18/19 08:50 Dose:  Not Given


Lorazepam (Ativan)  0.5 mg PO Q4HR PRN; Protocol


   PRN Reason: Anxiety/Agitation


   Stop: 07/15/19 20:14


   Last Admin: 06/17/19 08:42 Dose:  0.5 mg


Magnesium Hydroxide (Milk Of Magnesia)  30 ml PO HS PRN


   PRN Reason: Constipation


   Stop: 08/14/19 20:14


Midodrine (Proamatine)  5 mg PO Q12H [facility]


   Stop: 08/15/19 08:59


   Last Admin: 06/18/19 08:50 Dose:  5 mg


Multivitamins/Vitamin C (Theragran)  1 tab PO DAILY [facility]


   Stop: 08/15/19 08:59


   Last Admin: 06/18/19 08:50 Dose:  1 tab


Promethazine HCl/Dextromethorphan (Phenergan Dm 6.25/15mg-5 Ml)  5 ml PO TID PRN


   PRN Reason: Cough/ CONGESTION


   Stop: 08/15/19 01:44


Quetiapine Fumarate (Seroquel)  50 mg PO HS [facility]; Protocol


   Stop: 08/15/19 20:59


   Last Admin: 06/17/19 21:35 Dose:  50 mg


Quetiapine Fumarate (Seroquel)  25 mg PO TID [facility]; Protocol


   Stop: 08/15/19 08:59


   Last Admin: 06/18/19 08:50 Dose:  25 mg


Zinc Sulfate (Zinc Sulfate)  220 mg PO DAILY [facility]


   Stop: 08/15/19 08:59


   Last Admin: 06/18/19 08:50 Dose:  220 mg


Zolpidem Tartrate (Ambien)  5 mg PO HS PRN


   PRN Reason: Insomnia


   Stop: 08/14/19 20:14


   Last Admin: 06/17/19 21:36 Dose:  5 mg








General: demented


HEENT: NC/AT, PERRLA, EOMI


Neck: Supple, No JVD


Lungs: rales


Cardiovascular: RRR, Normal S1, Normal S2


Abdomen: soft, non-tender, non-distended, positive bowel sound


Extremities: excoriation


Neurological: no change





- Procedures


Procedures: 


 Procedures











Procedure Code Date


 


CARDIOPULM RESUSCITA NOS 99.60 10/19/07


 


CL REDUC DISLOC-HAND/FNG 79.74 08/29/07


 


CLOSURE SKIN & SUBCUTANEOUS NEC 86.59 09/10/12


 


CONTINUOUS INVASIVE MECHANICAL VENTILATION <96 CONSEC HRS 96.71 10/19/07


 


DESTRUCT EXT EAR LES NEC 18.29 06/27/01


 


DIPHTHERIA TOXOID ADMIN 99.36 02/03/12


 


EGD BIOPSY SINGLE/MULTIPLE 46592 06/16/10


 


EGD CONTROL BLEEDING ANY 48399 09/03/06


 


ELECTROCARDIOGRAM 89.52 06/27/01


 


ELECTROCARDIOGRAM COMPLETE 28926 06/27/01


 


EMERGENCY DEPT VISIT 78474 02/23/12


 


EMERGENCY DEPT VISIT 04393 02/03/12


 


ENDOSCOP EXC OR DEST OF LESION OR TISSUE OF ESOPHA 42.33 09/03/06


 


ESOPHAGOGASTRODUODENOSCOPY [EGD] W/CLOSED BIOPSY 45.16 06/16/10


 


EXC FACE-MM B9+LABERTO 2.1-3 CM 44726 06/27/01


 


FREEING OF BOWEL ADHESION 41506 06/19/08


 


HEART/LUNG RESUSCITATION CPR 00979 10/19/07


 


HERNIA REPAIR W/MESH 55540 02/23/12


 


IMMUNIZATION ADMIN 40200 02/03/12


 


INJECT/INFUSE NEC 99.29 04/18/13


 


INSERT EMERGENCY AIRWAY 01811 10/19/07


 


INSERT ENDOTRACHEAL TUBE 96.04 10/19/07


 


INSERTION OF INFUSION DEV INTO R SUBCLAV VEIN, PERC APPROACH [id number] 05/28/16


 


INSJ PICC 5 YR+ W/O IMAGING 78434 05/28/16


 


OP RED-INT FIX TIB/FIBUL 79.36 11/24/98


 


OTH LYSIS-PERITONEAL ADHES 54.59 02/23/12


 


OTHER C.A.T. SCAN 88.38 11/03/98


 


OTHER OPEN INCISIONAL HERNIA REPAIR WITH GRAFT OR PROSTHESIS 53.61 02/23/12


 


PART SM BOWEL RESECT NEC 45.62 06/19/08


 


PERMANENT ILEOSTOMY NEC 46.23 10/19/07


 


RADIOGRAPHIC PROCEDURE 20675 11/03/98


 


REMOVAL OF COLON 52969 10/19/07


 


REMOVAL OF SPLEEN TOTAL 67551 10/19/07


 


REPAIR BOWEL OPENING 10109 06/19/08


 


RPR S/N/AX/GEN/TRNK 2.5CM/< 41164 02/23/12


 


RPR S/N/AX/GEN/TRNK2.6-7.5CM 56294 09/10/12


 


RPR VENTRAL ALLISON INIT REDUC 45393 02/23/12


 


SM BOWEL STOMA CLOSURE 46.51 06/19/08


 


TD VACCINE > 7 IM 27343 02/03/12


 


TETANUS TOXOID ADMINIST 99.38 02/03/12


 


THER/PROPH/DIAG INJ IV PUSH 19880 04/18/13


 


TOT INTRA-ABD COLECTOMY 45.8 10/19/07


 


TOTAL SPLENECTOMY 41.5 10/19/07


 


TREAT FINGER DISLOCATION 61906 08/29/07


 


TREATMENT OF ANKLE FRACTURE 93202 11/24/98


 


ULTRASONOGRAPHY OF RIGHT SUBCLAVIAN VEIN, GUIDANCE [id number] 05/28/16


 


VENOUS CATHETERIZATION NEC 38.93 09/03/06














Internal Medicine Assmt/Plan





- Assessment


Assessment: 





ASSESSMENT AND PLAN: agitation fecal


impaction, low albumin, schizoaffective disorder, leukocytosis, dementia,


hypothyroidism, asthma, degenerative joint disease.  


elevated ammonia pyuria











- Plan


Plan: 








- Plan


Plan: 





PLAN:   We will continue the


patient on oxygen and bronchodilator treatments.    We will review the patient's


chest x-ray and will review the patient's urine culture.  We will refer the


patient to Psychiatry as well, adjust the patient's psychotropic medication.  We


will continue monitoring the patient closely.  


add lactulose


on short course levaquin

## 2019-06-18 NOTE — PROGRESS NOTES
DATE:  



SUBJECTIVE:  Chart was reviewed and the patient interviewed and discussed the

patient's condition with the staff and reviewed records and labs.  The patient

slept better last night.  She is still having episodes of agitation and was

yelling and screaming.  She also is still restless and has difficulty following

directions, although slightly easier to redirect her.  She also has still angry

mood and is still guarded and unable to answer questions coherently.  Otherwise,

the patient is compliant with taking medications with no side effects.



ASSESSMENT:  The patient is still agitated and psychotic and needs close

monitoring.



TREATMENT PLAN:  Continue to monitor behavior and condition closely.  Also,

continue to work on poor impulse control and adjusting medications and followup.





DD: 06/17/2019 21:11

DT: 06/18/2019 00:45

[facility]# [id number]  [id number]

## 2019-06-19 RX ADMIN — ALBUTEROL SULFATE SCH MG: 2 SYRUP ORAL at 08:37

## 2019-06-19 RX ADMIN — DIGOXIN SCH: 0.05 SOLUTION ORAL at 08:38

## 2019-06-19 RX ADMIN — LACTULOSE SCH GM: 20 SOLUTION ORAL at 16:33

## 2019-06-19 RX ADMIN — ALBUTEROL SULFATE SCH MG: 2 SYRUP ORAL at 16:33

## 2019-06-19 RX ADMIN — LACTULOSE SCH GM: 20 SOLUTION ORAL at 08:38

## 2019-06-19 NOTE — PROGRESS NOTES
DATE:  06/18/2019



SUBJECTIVE:  Chart was reviewed and the patient interviewed.  Also discussed the

patient's condition with the staff and reviewed records and labs.  The patient

is still guarded and she is still suspicious and paranoid.  The patient also is

still confused.  The patient also still needs lots of redirections and she is

still withdrawn.  She also still has yelling and screaming episodes and is

easily agitated and easily irritable.  Otherwise, the patient is still compliant

with taking her medications with no side effects of medications.



ASSESSMENT:  The patient is still agitated and she is still psychotic.



TREATMENT PLAN:  We will continue the patient on Depakote 500 mg twice a day and

Seroquel in a dose of 25 mg 3 times a day and 50 mg at bedtime.  Also, continue

Ativan on a p.r.n. basis.  Also, continue to work on behavior of the patient and

continue to follow up closely.





DD: 06/18/2019 19:56

DT: 06/19/2019 01:39

JOB# [id number]  [id number]

## 2019-06-19 NOTE — PROGRESS NOTES
DATE:  06/19/2019



SUBJECTIVE:  Chart was reviewed and the patient interviewed.  Also discussed the

patient's condition with the staff and reviewed records and labs.  The patient

is still confused and she still has episodes of yelling and screaming.  The

patient also is still nonsensical and still has difficulty making any sense and

making her needs known.  She also is still suspicious and paranoid with episodes

of yelling and screaming.  Otherwise, the patient is compliant with taking her

medications with no side effects of medications, which includes Depakote,

Seroquel, Aricept, and Haldol liquid.



ASSESSMENT:  The patient is still considered to be gravely disabled and

psychotic.



TREATMENT PLAN:  We will continue to monitor her condition closely.  Also, the

patient considered to be gravely disabled and will place the patient on 5250

hold.  Also, we will get  Depakote blood level and we will continue to follow up

closely.





DD: 06/19/2019 06:07

DT: 06/19/2019 19:04

JOB# [id number]  [id number]

## 2019-06-19 NOTE — INTERNAL MEDICINE PROG NOTE
Internal Medicine Subjective





- Subjective


Patient seen and examined:: with staff, chart reviewed


Patient is:: awake, verbal, non-interactive, agitated, confused


Per staff patient has:: no adverse event, no episodes of fall, poor appetite, 

agitated, combative





Internal Medicine Objective





- Results


Recent Labs: 


 Laboratory Last Values











Triglycerides  86 mg/dL (<150)   06/16/19  06:45    


 


Cholesterol  158 mg/dL (<200)   06/16/19  06:45    


 


LDL Cholesterol Direct  100 mg/dL ()   06/16/19  06:45    


 


HDL Cholesterol  38 mg/dL (23-92)   06/16/19  06:45    


 


Valproic Acid  35.7 ug/mL (50.0-100.0)  L  06/19/19  07:30    














- Physical Exam


Vitals and I&O: 


 Vital Signs











Temp  97.2 F   06/19/19 06:27


 


Pulse  61   06/19/19 08:38


 


Resp  20   06/19/19 07:41


 


BP  110/56   06/19/19 06:27


 


Pulse Ox  98   06/19/19 06:27








 Intake & Output











 06/18/19 06/19/19 06/19/19





 18:59 06:59 18:59


 


Intake Total 120 240 250


 


Balance 120 240 250


 


Intake:   


 


  Oral 120 240 250


 


Other:   


 


  # Voids  2 


 


  # Bowel Movements  0 


 


  Stool Characteristics Soft Soft Soft











Active Medications: 


Current Medications





Acetaminophen (Tylenol 650mg Supp)  650 mg RC Q4H PRN


   PRN Reason: Mild Pain/Headache/T above 101


   Stop: 08/14/19 20:14


Al Hydrox/Mg Hydrox/Simethicone (Maalox)  30 ml PO Q6H PRN


   PRN Reason: Dyspepsia


   Stop: 08/14/19 20:14


Albuterol Sulfate (Ventolin)  2 mg PO BID [facility]


   Stop: 08/15/19 08:59


   Last Admin: 06/19/19 08:37 Dose:  2 mg


Ascorbic Acid (Vitamin C)  500 mg PO DAILY [facility]


   Stop: 08/15/19 08:59


   Last Admin: 06/19/19 08:37 Dose:  500 mg


Betamethasone Dipropionate (Betamethasone Dip 0.05% Cream)  1 appl TP DAILY [facility]


   Stop: 08/15/19 08:59


   Last Admin: 06/19/19 08:37 Dose:  1 appl


Clopidogrel Bisulfate (Plavix)  75 mg PO DAILY [facility]


   Stop: 08/15/19 08:59


   Last Admin: 06/19/19 08:37 Dose:  75 mg


Digoxin (Lanoxin)  0.125 mg PO DAILY [facility]


   Stop: 08/15/19 08:59


   Last Admin: 06/19/19 08:38 Dose:  Not Given


Divalproex Sodium (Depakote Er)  500 mg PO Q12HR [facility]; Protocol


   Stop: 08/15/19 08:59


   Last Admin: 06/19/19 08:38 Dose:  500 mg


Donepezil HCl (Aricept)  5 mg PO HS [facility]


   Stop: 08/15/19 20:59


   Last Admin: 06/18/19 21:41 Dose:  5 mg


Haloperidol Lactate (Haldol)  1 mg PO BID PRN; Protocol


   PRN Reason: Agitation


   Stop: 08/15/19 08:59


   Last Admin: 06/18/19 11:48 Dose:  1 mg


Lactulose (Cephulac)  30 gm PO BID [facility]


   Stop: 08/15/19 08:59


   Last Admin: 06/19/19 08:38 Dose:  30 gm


Lorazepam (Ativan)  0.5 mg PO Q4HR PRN; Protocol


   PRN Reason: Anxiety/Agitation


   Stop: 07/15/19 20:14


   Last Admin: 06/17/19 08:42 Dose:  0.5 mg


Magnesium Hydroxide (Milk Of Magnesia)  30 ml PO HS PRN


   PRN Reason: Constipation


   Stop: 08/14/19 20:14


Midodrine (Proamatine)  5 mg PO Q12H [facility]


   Stop: 08/15/19 08:59


   Last Admin: 06/19/19 08:38 Dose:  5 mg


Multivitamins/Vitamin C (Theragran)  1 tab PO DAILY [facility]


   Stop: 08/15/19 08:59


   Last Admin: 06/19/19 08:38 Dose:  1 tab


Promethazine HCl/Dextromethorphan (Phenergan Dm 6.25/15mg-5 Ml)  5 ml PO TID PRN


   PRN Reason: Cough/ CONGESTION


   Stop: 08/15/19 01:44


Quetiapine Fumarate (Seroquel)  50 mg PO HS [facility]; Protocol


   Stop: 08/15/19 20:59


   Last Admin: 06/18/19 21:43 Dose:  50 mg


Quetiapine Fumarate (Seroquel)  25 mg PO TID [facility]; Protocol


   Stop: 08/15/19 08:59


   Last Admin: 06/19/19 08:39 Dose:  25 mg


Zinc Sulfate (Zinc Sulfate)  220 mg PO DAILY [facility]


   Stop: 08/15/19 08:59


   Last Admin: 06/19/19 08:39 Dose:  220 mg


Zolpidem Tartrate (Ambien)  5 mg PO HS PRN


   PRN Reason: Insomnia


   Stop: 08/14/19 20:14


   Last Admin: 06/17/19 21:36 Dose:  5 mg








General: demented


HEENT: NC/AT, PERRLA, EOMI


Neck: Supple, No JVD


Lungs: rales


Cardiovascular: RRR, Normal S1, Normal S2


Abdomen: soft, non-tender, non-distended, positive bowel sound


Extremities: excoriation


Neurological: no change





- Procedures


Procedures: 


 Procedures











Procedure Code Date


 


CARDIOPULM RESUSCITA NOS 99.60 10/19/07


 


CL REDUC DISLOC-HAND/FNG 79.74 08/29/07


 


CLOSURE SKIN & SUBCUTANEOUS NEC 86.59 09/10/12


 


CONTINUOUS INVASIVE MECHANICAL VENTILATION <96 CONSEC HRS 96.71 10/19/07


 


DESTRUCT EXT EAR LES NEC 18.29 06/27/01


 


DIPHTHERIA TOXOID ADMIN 99.36 02/03/12


 


EGD BIOPSY SINGLE/MULTIPLE 35849 06/16/10


 


EGD CONTROL BLEEDING ANY 29022 09/03/06


 


ELECTROCARDIOGRAM 89.52 06/27/01


 


ELECTROCARDIOGRAM COMPLETE 38869 06/27/01


 


EMERGENCY DEPT VISIT 19647 02/23/12


 


EMERGENCY DEPT VISIT 78162 02/03/12


 


ENDOSCOP EXC OR DEST OF LESION OR TISSUE OF ESOPHA 42.33 09/03/06


 


ESOPHAGOGASTRODUODENOSCOPY [EGD] W/CLOSED BIOPSY 45.16 06/16/10


 


EXC FACE-MM B9+ALBERTO 2.1-3 CM 79925 06/27/01


 


FREEING OF BOWEL ADHESION 41134 06/19/08


 


HEART/LUNG RESUSCITATION CPR 34714 10/19/07


 


HERNIA REPAIR W/MESH 79911 02/23/12


 


IMMUNIZATION ADMIN 13573 02/03/12


 


INJECT/INFUSE NEC 99.29 04/18/13


 


INSERT EMERGENCY AIRWAY 63742 10/19/07


 


INSERT ENDOTRACHEAL TUBE 96.04 10/19/07


 


INSERTION OF INFUSION DEV INTO R SUBCLAV VEIN, PERC APPROACH [id number] 05/28/16


 


INSJ PICC 5 YR+ W/O IMAGING 11929 05/28/16


 


OP RED-INT FIX TIB/FIBUL 79.36 11/24/98


 


OTH LYSIS-PERITONEAL ADHES 54.59 02/23/12


 


OTHER C.A.T. SCAN 88.38 11/03/98


 


OTHER OPEN INCISIONAL HERNIA REPAIR WITH GRAFT OR PROSTHESIS 53.61 02/23/12


 


PART SM BOWEL RESECT NEC 45.62 06/19/08


 


PERMANENT ILEOSTOMY NEC 46.23 10/19/07


 


RADIOGRAPHIC PROCEDURE 75825 11/03/98


 


REMOVAL OF COLON 06384 10/19/07


 


REMOVAL OF SPLEEN TOTAL 25059 10/19/07


 


REPAIR BOWEL OPENING 17053 06/19/08


 


RPR S/N/AX/GEN/TRNK 2.5CM/< 08533 02/23/12


 


RPR S/N/AX/GEN/TRNK2.6-7.5CM 20679 09/10/12


 


RPR VENTRAL ALLISON INIT REDUC 62145 02/23/12


 


SM BOWEL STOMA CLOSURE 46.51 06/19/08


 


TD VACCINE > 7 IM 35670 02/03/12


 


TETANUS TOXOID ADMINIST 99.38 02/03/12


 


THER/PROPH/DIAG INJ IV PUSH 51221 04/18/13


 


TOT INTRA-ABD COLECTOMY 45.8 10/19/07


 


TOTAL SPLENECTOMY 41.5 10/19/07


 


TREAT FINGER DISLOCATION 12272 08/29/07


 


TREATMENT OF ANKLE FRACTURE 85278 11/24/98


 


ULTRASONOGRAPHY OF RIGHT SUBCLAVIAN VEIN, GUIDANCE [id number] 05/28/16


 


VENOUS CATHETERIZATION NEC 38.93 09/03/06














Internal Medicine Assmt/Plan





- Assessment


Assessment: 





ASSESSMENT AND PLAN: agitation fecal


impaction, low albumin, schizoaffective disorder, leukocytosis, dementia,


hypothyroidism, asthma, degenerative joint disease.  


elevated ammonia pyuria











- Plan


Plan: 








- Plan


Plan: 





PLAN:   We will continue the


patient on oxygen and bronchodilator treatments.    We will review the patient's


chest x-ray and will review the patient's urine culture.  We will refer the


patient to Psychiatry as well, adjust the patient's psychotropic medication.  We


will continue monitoring the patient closely.  


add lactulose


on short course levaquin

## 2019-06-20 RX ADMIN — LACTULOSE SCH: 20 SOLUTION ORAL at 16:10

## 2019-06-20 RX ADMIN — ALBUTEROL SULFATE SCH: 2 SYRUP ORAL at 16:10

## 2019-06-20 RX ADMIN — LACTULOSE SCH GM: 20 SOLUTION ORAL at 09:04

## 2019-06-20 RX ADMIN — ALBUTEROL SULFATE SCH: 2 SYRUP ORAL at 11:18

## 2019-06-20 RX ADMIN — DIGOXIN SCH MG: 0.05 SOLUTION ORAL at 09:04

## 2019-06-20 RX ADMIN — HALOPERIDOL LACTATE PRN MG: 2 SOLUTION, CONCENTRATE ORAL at 18:21

## 2019-06-20 NOTE — INTERNAL MEDICINE PROG NOTE
Internal Medicine Subjective





- Subjective


Patient seen and examined:: with staff, chart reviewed


Patient is:: awake, verbal, non-interactive, agitated, confused


Per staff patient has:: no adverse event, no episodes of fall, poor appetite, 

agitated, combative





Internal Medicine Objective





- Results


Recent Labs: 


 Laboratory Last Values











Triglycerides  86 mg/dL (<150)   19  06:45    


 


Cholesterol  158 mg/dL (<200)   19  06:45    


 


LDL Cholesterol Direct  100 mg/dL ()   19  06:45    


 


HDL Cholesterol  38 mg/dL (23-92)   19  06:45    


 


Valproic Acid  35.7 ug/mL (50.0-100.0)  L  19  07:30    














- Physical Exam


Vitals and I&O: 


 Vital Signs











Temp  97.2 F   19 06:43


 


Pulse  64   19 09:04


 


Resp  18   19 06:43


 


BP  103/60   19 06:43


 


Pulse Ox  98   19 06:43








 Intake & Output











 19





 18:59 06:59 18:59


 


Intake Total 250 120 


 


Balance 250 120 


 


Intake:   


 


  Oral 250 120 


 


Other:   


 


  # Voids  2 


 


  # Bowel Movements  0 


 


  Stool Characteristics Soft  











Active Medications: 


Current Medications





Acetaminophen (Tylenol 650mg Supp)  650 mg RC Q4H PRN


   PRN Reason: Mild Pain/Headache/T above 101


   Stop: 19 20:14


Al Hydrox/Mg Hydrox/Simethicone (Maalox)  30 ml PO Q6H PRN


   PRN Reason: Dyspepsia


   Stop: 19 20:14


Albuterol Sulfate (Ventolin)  2 mg PO BID [facility]


   Stop: 08/15/19 08:59


   Last Admin: 19 11:18 Dose:  Not Given


Ascorbic Acid (Vitamin C)  500 mg PO DAILY [facility]


   Stop: 08/15/19 08:59


   Last Admin: 19 09:05 Dose:  500 mg


Betamethasone Dipropionate (Betamethasone Dip 0.05% Cream)  1 appl TP DAILY [facility]


   Stop: 08/15/19 08:59


   Last Admin: 19 09:05 Dose:  1 appl


Clopidogrel Bisulfate (Plavix)  75 mg PO DAILY [facility]


   Stop: 08/15/19 08:59


   Last Admin: 19 09:05 Dose:  75 mg


Digoxin (Lanoxin)  0.125 mg PO DAILY [facility]


   Stop: 08/15/19 08:59


   Last Admin: 19 09:04 Dose:  0.125 mg


Divalproex Sodium (Depakote Er)  500 mg PO Q12HR [facility]; Protocol


   Stop: 08/15/19 08:59


   Last Admin: 19 09:05 Dose:  500 mg


Donepezil HCl (Aricept)  5 mg PO HS [facility]


   Stop: 08/15/19 20:59


   Last Admin: 19 21:00 Dose:  5 mg


Haloperidol Lactate (Haldol)  1 mg PO BID PRN; Protocol


   PRN Reason: Agitation


   Stop: 08/15/19 08:59


   Last Admin: 19 11:48 Dose:  1 mg


Lactulose (Cephulac)  30 gm PO BID [facility]


   Stop: 08/15/19 08:59


   Last Admin: 19 09:04 Dose:  30 gm


Lorazepam (Ativan)  0.5 mg PO Q4HR PRN; Protocol


   PRN Reason: Anxiety/Agitation


   Stop: 07/15/19 20:14


   Last Admin: 19 08:42 Dose:  0.5 mg


Magnesium Hydroxide (Milk Of Magnesia)  30 ml PO HS PRN


   PRN Reason: Constipation


   Stop: 19 20:14


Midodrine (Proamatine)  5 mg PO Q12H [facility]


   Stop: 08/15/19 08:59


   Last Admin: 19 09:05 Dose:  5 mg


Multivitamins/Vitamin C (Theragran)  1 tab PO DAILY [facility]


   Stop: 08/15/19 08:59


   Last Admin: 19 09:05 Dose:  1 tab


Promethazine HCl/Dextromethorphan (Phenergan Dm 6.25/15mg-5 Ml)  5 ml PO TID PRN


   PRN Reason: Cough/ CONGESTION


   Stop: 08/15/19 01:44


Quetiapine Fumarate (Seroquel)  50 mg PO HS [facility]; Protocol


   Stop: 08/15/19 20:59


   Last Admin: 19 21:00 Dose:  50 mg


Quetiapine Fumarate (Seroquel)  25 mg PO TID [facility]; Protocol


   Stop: 08/15/19 08:59


   Last Admin: 19 09:05 Dose:  25 mg


Zinc Sulfate (Zinc Sulfate)  220 mg PO DAILY [facility]


   Stop: 08/15/19 08:59


   Last Admin: 19 09:05 Dose:  220 mg


Zolpidem Tartrate (Ambien)  5 mg PO HS PRN


   PRN Reason: Insomnia


   Stop: 19 20:14


   Last Admin: 19 21:36 Dose:  5 mg








General: demented


HEENT: NC/AT, PERRLA, EOMI


Neck: Supple, No JVD


Lungs: rales


Cardiovascular: RRR, Normal S1, Normal S2


Abdomen: soft, non-tender, non-distended, positive bowel sound


Extremities: excoriation


Neurological: no change





- Procedures


Procedures: 


 Procedures











Procedure Code Date


 


CARDIOPULM RESUSCITA NOS 99.60 10/19/07


 


CL REDUC DISLOC-HAND/FNG 79.74 07


 


CLOSURE SKIN & SUBCUTANEOUS NEC 86.59 09/10/12


 


CONTINUOUS INVASIVE MECHANICAL VENTILATION <96 CONSEC HRS 96.71 10/19/07


 


DESTRUCT EXT EAR LES NEC 18.29 01


 


DIPHTHERIA TOXOID ADMIN 99.36 12


 


EGD BIOPSY SINGLE/MULTIPLE 53181 06/16/10


 


EGD CONTROL BLEEDING ANY 88127 06


 


ELECTROCARDIOGRAM 89.52 01


 


ELECTROCARDIOGRAM COMPLETE 87479 01


 


EMERGENCY DEPT VISIT 41824 12


 


EMERGENCY DEPT VISIT 30647 12


 


ENDOSCOP EXC OR DEST OF LESION OR TISSUE OF ESOPHA 42.33 06


 


ESOPHAGOGASTRODUODENOSCOPY [EGD] W/CLOSED BIOPSY 45.16 06/16/10


 


EXC FACE-MM B9+ALBERTO 2.1-3 CM 76684 01


 


FREEING OF BOWEL ADHESION 48131 08


 


HEART/LUNG RESUSCITATION CPR 85386 10/19/07


 


HERNIA REPAIR W/MESH 26653 12


 


IMMUNIZATION ADMIN 47590 12


 


INJECT/INFUSE NEC 99.29 13


 


INSERT EMERGENCY AIRWAY 06961 10/19/07


 


INSERT ENDOTRACHEAL TUBE 96.04 10/19/07


 


INSERTION OF INFUSION DEV INTO R SUBCLAV VEIN, PERC APPROACH [id number] 16


 


INSJ PICC 5 YR+ W/O IMAGING 39810 16


 


OP RED-INT FIX TIB/FIBUL 79.36 98


 


OTH LYSIS-PERITONEAL ADHES 54.59 12


 


OTHER C.A.T. SCAN 88.38 98


 


OTHER OPEN INCISIONAL HERNIA REPAIR WITH GRAFT OR PROSTHESIS 53.61 12


 


PART SM BOWEL RESECT NEC 45.62 08


 


PERMANENT ILEOSTOMY NEC 46.23 10/19/07


 


RADIOGRAPHIC PROCEDURE 18111 98


 


REMOVAL OF COLON 01042 10/19/07


 


REMOVAL OF SPLEEN TOTAL 00295 10/19/07


 


REPAIR BOWEL OPENING 12563 08


 


RPR S/N/AX/GEN/TRNK 2.5CM/< 72542 12


 


RPR S/N/AX/GEN/TRNK2.6-7.5CM 61650 09/10/12


 


RPR VENTRAL ALLISON INIT REDUC 00742 12


 


SM BOWEL STOMA CLOSURE 46.51 08


 


TD VACCINE > 7 IM 35776 12


 


TETANUS TOXOID ADMINIST 99.38 12


 


THER/PROPH/DIAG INJ IV PUSH 17381 13


 


TOT INTRA-ABD COLECTOMY 45.8 10/19/07


 


TOTAL SPLENECTOMY 41.5 10/19/07


 


TREAT FINGER DISLOCATION 02747 07


 


TREATMENT OF ANKLE FRACTURE 78971 98


 


ULTRASONOGRAPHY OF RIGHT SUBCLAVIAN VEIN, GUIDANCE [id number] 16


 


VENOUS CATHETERIZATION NEC 38.93 06














Internal Medicine Assmt/Plan





- Assessment


Assessment: 





ASSESSMENT AND PLAN: agitation fecal


impaction, low albumin, schizoaffective disorder, leukocytosis, dementia,


hypothyroidism, asthma, degenerative joint disease.  


elevated ammonia pyuria











- Plan


Plan: 








- Plan


Plan: 





PLAN:   We will continue the


patient on oxygen and bronchodilator treatments.    We will review the patient's


chest x-ray and will review the patient's urine culture.  We will refer the


patient to Psychiatry as well, adjust the patient's psychotropic medication.  We


will continue monitoring the patient closely.  


add lactulose


on short course levaquin





Nutritional Asmnt/Malnutr-PDOC





- Dietary Evaluation


Malnutrition Findings (Please click <Entered> for more info): 








Nutritional Asmnt/Malnutrition                             Start:  19 19:

41


Text:                                                      Status: Active      

  


Freq:                                                                          

  


Protocol:                                                                      

  


 Document     19 19:41  FNS.D01  (Rec: 19 19:53  [id number]  [id number])


 Nutritional Asmnt/Malnutrition


     Patient General Information


      Nutritional Screening                      Moderate Risk


      Diagnosis                                  psychosis


      Subjective Information                     pt non-verbal, spoke with RN


                                                 who reports patient with good


                                                 appetite, is eating 100% of


                                                 meals, loves strawberry ice


                                                 cream and chicken.


      Current Diet Order/ Nutrition Support      Pureed


      Patient / S.O                              Not Indicated


      Pertinent Medications                      vitamin C, MOM, zinc sulfate,


                                                 theragran


      Pertinent Labs                             Labs reviewed.


     Nutritional Hx/Data


      Height                                     1.7 m


      Height (Calculated Centimeters)            170.2


      Current Weight (lbs)                       72.575 kg


      Weight (Calculated Kilograms)              72.6


      Weight (Calculated Grams)                  57165.8


      Ideal Body Weight                          61.4 kg, 135 lb


      % Ideal Body Weight                        119


      Body Mass Index (BMI)                      25.0


      Weight Status                              Overweight


     GI Symptoms


      GI Symptoms                                None


      Last BM                                     (soft, x 2)


      Difficult in:                              Chewing


      Food Allergies                             No


      Cultural/Ethnic/[place] Belief           unable to assess


      Usual diet at home                         unable to assess


      Skin Integrity/Comment:                    no pressure injury/non-healing


                                                 wounds


      Current %PO                                Good (%)


     Estimated Nutritional Goals


      BEE in Kcals:                              Using Current wt


      Calories/Kcals/Kg                          25-30


      Kcals Calculated                           4976-2287


      Protein:                                   Using Current wt


      Protein g/k-90


      Protein Calculated                         1-1.25


      Fluid: ml                                  6159-9497 (1 ml/kcal)


     Nutritional Problem


      1. Problem


       Problem                                   No nutrition diagnosis at this


                                                 time.


     Intervention/Recommendation


      Comments                                   suggest continue current diet


                                                 rx, pureed, per MD.


     Expected Outcomes/Goals


      Expected Outcomes/Goals                    Maintain PO intakes % of


                                                 meals.


                                                 Electronically Signed by:


                                                 Shira Alonso, MPH, RDN


                                                 Clinical Dietitian 19 7:


                                                 54 PM

## 2019-06-21 RX ADMIN — LACTULOSE SCH GM: 20 SOLUTION ORAL at 16:05

## 2019-06-21 RX ADMIN — HALOPERIDOL LACTATE PRN MG: 2 SOLUTION, CONCENTRATE ORAL at 05:16

## 2019-06-21 RX ADMIN — LACTULOSE SCH GM: 20 SOLUTION ORAL at 09:02

## 2019-06-21 RX ADMIN — DIGOXIN SCH MG: 0.05 SOLUTION ORAL at 09:13

## 2019-06-21 NOTE — INTERNAL MEDICINE PROG NOTE
Internal Medicine Subjective





- Subjective


Patient seen and examined:: with staff, chart reviewed


Patient is:: awake, verbal, non-interactive, agitated, confused


Per staff patient has:: no adverse event, no episodes of fall, poor appetite, 

agitated, combative





Internal Medicine Objective





- Results


Recent Labs: 


 Laboratory Last Values











Triglycerides  86 mg/dL (<150)   19  06:45    


 


Cholesterol  158 mg/dL (<200)   19  06:45    


 


LDL Cholesterol Direct  100 mg/dL ()   19  06:45    


 


HDL Cholesterol  38 mg/dL (23-92)   19  06:45    


 


Valproic Acid  35.7 ug/mL (50.0-100.0)  L  19  07:30    














- Physical Exam


Vitals and I&O: 


 Vital Signs











Temp  97.6 F   19 06:20


 


Pulse  85   19 09:13


 


Resp  18   19 06:20


 


BP  139/74   19 06:20


 


Pulse Ox  97   19 06:20








 Intake & Output











 19





 18:59 06:59 18:59


 


Intake Total 1200 120 


 


Balance 1200 120 


 


Intake:   


 


  Oral 1200 120 


 


Other:   


 


  # Voids 3 3 


 


  # Bowel Movements 0 2 











Active Medications: 


Current Medications





Acetaminophen (Tylenol 650mg Supp)  650 mg RC Q4H PRN


   PRN Reason: Mild Pain/Headache/T above 101


   Stop: 19 20:14


Al Hydrox/Mg Hydrox/Simethicone (Maalox)  30 ml PO Q6H PRN


   PRN Reason: Dyspepsia


   Stop: 19 20:14


Albuterol Sulfate (Ventolin)  2 mg PO BID [facility]


   Stop: 08/15/19 08:59


   Last Admin: 19 16:10 Dose:  Not Given


Ascorbic Acid (Vitamin C)  500 mg PO DAILY [facility]


   Stop: 08/15/19 08:59


   Last Admin: 19 09:02 Dose:  500 mg


Betamethasone Dipropionate (Betamethasone Dip 0.05% Cream)  1 appl TP DAILY [facility]


   Stop: 08/15/19 08:59


   Last Admin: 19 09:03 Dose:  1 appl


Clonazepam (Klonopin)  0.5 mg PO BID [facility]; Protocol


   Stop: 19 08:59


Clopidogrel Bisulfate (Plavix)  75 mg PO DAILY [facility]


   Stop: 08/15/19 08:59


   Last Admin: 19 09:02 Dose:  75 mg


Digoxin (Lanoxin)  0.125 mg PO DAILY [facility]


   Stop: 08/15/19 08:59


   Last Admin: 19 09:13 Dose:  0.125 mg


Divalproex Sodium (Depakote Er)  500 mg PO Q12HR [facility]; Protocol


   Stop: 08/15/19 08:59


   Last Admin: 19 09:03 Dose:  500 mg


Donepezil HCl (Aricept)  5 mg PO HS [facility]


   Stop: 08/15/19 20:59


   Last Admin: 19 21:10 Dose:  5 mg


Haloperidol Lactate (Haldol)  1 mg PO BID PRN; Protocol


   PRN Reason: Agitation


   Stop: 08/15/19 08:59


   Last Admin: 19 05:16 Dose:  1 mg


Lactulose (Cephulac)  30 gm PO BID [facility]


   Stop: 08/15/19 08:59


   Last Admin: 19 09:02 Dose:  30 gm


Lorazepam (Ativan)  0.5 mg PO Q4HR PRN; Protocol


   PRN Reason: Anxiety/Agitation


   Stop: 07/15/19 20:14


   Last Admin: 19 01:14 Dose:  0.5 mg


Magnesium Hydroxide (Milk Of Magnesia)  30 ml PO HS PRN


   PRN Reason: Constipation


   Stop: 19 20:14


Midodrine (Proamatine)  5 mg PO Q12H [facility]


   Stop: 08/15/19 08:59


   Last Admin: 19 09:13 Dose:  5 mg


Multivitamins/Vitamin C (Theragran)  1 tab PO DAILY [facility]


   Stop: 08/15/19 08:59


   Last Admin: 19 09:02 Dose:  1 tab


Promethazine HCl/Dextromethorphan (Phenergan Dm 6.25/15mg-5 Ml)  5 ml PO TID PRN


   PRN Reason: Cough/ CONGESTION


   Stop: 08/15/19 01:44


Quetiapine Fumarate (Seroquel)  50 mg PO HS [facility]; Protocol


   Stop: 08/15/19 20:59


   Last Admin: 19 21:10 Dose:  50 mg


Quetiapine Fumarate (Seroquel)  25 mg PO TID [facility]; Protocol


   Stop: 08/15/19 08:59


   Last Admin: 19 09:02 Dose:  25 mg


Zinc Sulfate (Zinc Sulfate)  220 mg PO DAILY [facility]


   Stop: 08/15/19 08:59


   Last Admin: 19 09:03 Dose:  220 mg


Zolpidem Tartrate (Ambien)  5 mg PO HS PRN


   PRN Reason: Insomnia


   Stop: 19 20:14


   Last Admin: 19 00:49 Dose:  5 mg








General: demented


HEENT: NC/AT, PERRLA, EOMI


Neck: Supple, No JVD


Lungs: rales


Cardiovascular: RRR, Normal S1, Normal S2


Abdomen: soft, non-tender, non-distended, positive bowel sound


Extremities: excoriation


Neurological: no change





- Procedures


Procedures: 


 Procedures











Procedure Code Date


 


CARDIOPULM RESUSCITA NOS 99.60 10/19/07


 


CL REDUC DISLOC-HAND/FNG 79.74 07


 


CLOSURE SKIN & SUBCUTANEOUS NEC 86.59 09/10/12


 


CONTINUOUS INVASIVE MECHANICAL VENTILATION <96 CONSEC HRS 96.71 10/19/07


 


DESTRUCT EXT EAR LES NEC 18.29 01


 


DIPHTHERIA TOXOID ADMIN 99.36 12


 


EGD BIOPSY SINGLE/MULTIPLE 75384 06/16/10


 


EGD CONTROL BLEEDING ANY 15744 06


 


ELECTROCARDIOGRAM 89.52 01


 


ELECTROCARDIOGRAM COMPLETE 84234 01


 


EMERGENCY DEPT VISIT 92220 12


 


EMERGENCY DEPT VISIT 10611 12


 


ENDOSCOP EXC OR DEST OF LESION OR TISSUE OF ESOPHA 42.33 06


 


ESOPHAGOGASTRODUODENOSCOPY [EGD] W/CLOSED BIOPSY 45.16 06/16/10


 


EXC FACE-MM B9+ALBERTO 2.1-3 CM 09039 01


 


FREEING OF BOWEL ADHESION 95760 08


 


HEART/LUNG RESUSCITATION CPR 99837 10/19/07


 


HERNIA REPAIR W/MESH 26963 12


 


IMMUNIZATION ADMIN 39535 12


 


INJECT/INFUSE NEC 99.29 13


 


INSERT EMERGENCY AIRWAY 76236 10/19/07


 


INSERT ENDOTRACHEAL TUBE 96.04 10/19/07


 


INSERTION OF INFUSION DEV INTO R SUBCLAV VEIN, PERC APPROACH [id number] 16


 


INSJ PICC 5 YR+ W/O IMAGING 00489 16


 


OP RED-INT FIX TIB/FIBUL 79.36 98


 


OTH LYSIS-PERITONEAL ADHES 54.59 12


 


OTHER C.A.T. SCAN 88.38 98


 


OTHER OPEN INCISIONAL HERNIA REPAIR WITH GRAFT OR PROSTHESIS 53.61 12


 


PART SM BOWEL RESECT NEC 45.62 08


 


PERMANENT ILEOSTOMY NEC 46.23 10/19/07


 


RADIOGRAPHIC PROCEDURE 69222 98


 


REMOVAL OF COLON 58435 10/19/07


 


REMOVAL OF SPLEEN TOTAL 72946 10/19/07


 


REPAIR BOWEL OPENING 59041 08


 


RPR S/N/AX/GEN/TRNK 2.5CM/< 35587 12


 


RPR S/N/AX/GEN/TRNK2.6-7.5CM 13545 09/10/12


 


RPR VENTRAL ALLISON INIT REDUC 35278 12


 


SM BOWEL STOMA CLOSURE 46.51 08


 


TD VACCINE > 7 IM 89184 12


 


TETANUS TOXOID ADMINIST 99.38 12


 


THER/PROPH/DIAG INJ IV PUSH 00917 13


 


TOT INTRA-ABD COLECTOMY 45.8 10/19/07


 


TOTAL SPLENECTOMY 41.5 10/19/07


 


TREAT FINGER DISLOCATION 26194 07


 


TREATMENT OF ANKLE FRACTURE 67208 98


 


ULTRASONOGRAPHY OF RIGHT SUBCLAVIAN VEIN, GUIDANCE [id number] 16


 


VENOUS CATHETERIZATION NEC 38.93 06














Internal Medicine Assmt/Plan





- Assessment


Assessment: 





ASSESSMENT AND PLAN: agitation fecal


impaction, low albumin, schizoaffective disorder, leukocytosis, dementia,


hypothyroidism, asthma, degenerative joint disease.  


elevated ammonia pyuria











- Plan


Plan: 








- Plan


Plan: 





PLAN:   We will continue the


patient on oxygen and bronchodilator treatments.    We will review the patient's


chest x-ray and will review the patient's urine culture.  We will refer the


patient to Psychiatry as well, adjust the patient's psychotropic medication.  We


will continue monitoring the patient closely.  


add lactulose


on short course levaquin





Nutritional Asmnt/Malnutr-PDOC





- Dietary Evaluation


Malnutrition Findings (Please click <Entered> for more info): 








Nutritional Asmnt/Malnutrition                             Start:  19 19:

41


Text:                                                      Status: Active      

  


Freq:                                                                          

  


Protocol:                                                                      

  


 Document     19 19:41  FNS.D01  (Rec: 19 19:53  FNS.D01  VEENA-FNS1)


 Nutritional Asmnt/Malnutrition


     Patient General Information


      Nutritional Screening                      Moderate Risk


      Diagnosis                                  psychosis


      Subjective Information                     pt non-verbal, spoke with RN


                                                 who reports patient with good


                                                 appetite, is eating 100% of


                                                 meals, loves strawberry ice


                                                 cream and chicken.


      Current Diet Order/ Nutrition Support      Pureed


      Patient / S.O                              Not Indicated


      Pertinent Medications                      vitamin C, MOM, zinc sulfate,


                                                 theragran


      Pertinent Labs                             Labs reviewed.


     Nutritional Hx/Data


      Height                                     1.7 m


      Height (Calculated Centimeters)            170.2


      Current Weight (lbs)                       72.575 kg


      Weight (Calculated Kilograms)              72.6


      Weight (Calculated Grams)                  42282.8


      Ideal Body Weight                          61.4 kg, 135 lb


      % Ideal Body Weight                        119


      Body Mass Index (BMI)                      25.0


      Weight Status                              Overweight


     GI Symptoms


      GI Symptoms                                None


      Last BM                                     (soft, x 2)


      Difficult in:                              Chewing


      Food Allergies                             No


      Cultural/Ethnic/Tenriism Belief           unable to assess


      Usual diet at home                         unable to assess


      Skin Integrity/Comment:                    no pressure injury/non-healing


                                                 wounds


      Current %PO                                Good (%)


     Estimated Nutritional Goals


      BEE in Kcals:                              Using Current wt


      Calories/Kcals/Kg                          25-30


      Kcals Calculated                           8060-5946


      Protein:                                   Using Current wt


      Protein g/k-90


      Protein Calculated                         1-1.25


      Fluid: ml                                  9524-5156 (1 ml/kcal)


     Nutritional Problem


      1. Problem


       Problem                                   No nutrition diagnosis at this


                                                 time.


     Intervention/Recommendation


      Comments                                   suggest continue current diet


                                                 rx, pureed, per MD.


     Expected Outcomes/Goals


      Expected Outcomes/Goals                    Maintain PO intakes % of


                                                 meals.


                                                 Electronically Signed by:


                                                 Shira Alonso, MPH, RDN


                                                 Clinical Dietitian 19 7:


                                                 54 PM

## 2019-06-21 NOTE — PROGRESS NOTES
DATE:  



SUBJECTIVE:  Chart was reviewed and the patient interviewed.  Also discussed the

patient's condition with the staff and reviewed records and labs.  The patient

is constantly screaming and constantly yelling with difficulty monitoring any of

her problems and difficulty following any directions.  The patient also is

bothering others and is intrusive and interrupting the milieu of the unit by her

screaming and yelling.  The patient also is restless and she is still unable to

have any coherent conversation because of constant yelling and screaming.  On

the other hand, the patient is compliant with taking medications and no side

effects of Depakote or Seroquel.



ASSESSMENT:  The patient is still agitated and is still psychotic.



TREATMENT PLAN:  We will continue to monitor her behavior and her condition

closely.  Also, we will Klonopin 0.5 mg twice a day and will adjust the dose. 

Also, continue to work on her anger and irritability and continue to follow up

closely.





DD: 06/21/2019 07:57

DT: 06/21/2019 14:30

JOB# [id number]  [id number]

## 2019-06-21 NOTE — PROGRESS NOTES
DATE:  



SUBJECTIVE:  Chart was reviewed and the patient interviewed.  Also discussed the

patient's condition with the staff and reviewed records and labs.  The patient

is still confused and still mumbles nonsensical words and still has difficulty

making her needs known.  The patient also is still restless and she still needs

redirections.  On the other hands, the patient seems to be calmer than before

and seems to be less agitated and less irritable most of the time.  The patient

also has been compliant with taking her medications with no side effects of

medications.



Depakote blood level that was done yesterday came back to be 35.7, which is

below therapeutic level, but at the same time the patient seems to be calmer and

will continue current dose for further evaluation of her behavior.  At the same

time, we will continue to follow up closely and continue to work on her behavior

modification and adjusting psychotropic medications.





DD: 06/20/2019 06:56

DT: 06/20/2019 22:39

[facility]# [id number]  [id number]

## 2019-06-22 RX ADMIN — LACTULOSE SCH GM: 20 SOLUTION ORAL at 16:18

## 2019-06-22 RX ADMIN — LACTULOSE SCH GM: 20 SOLUTION ORAL at 09:07

## 2019-06-22 RX ADMIN — ALBUTEROL SULFATE SCH MG: 2 SYRUP ORAL at 09:09

## 2019-06-22 RX ADMIN — ALBUTEROL SULFATE SCH MG: 2 SYRUP ORAL at 16:18

## 2019-06-22 RX ADMIN — HALOPERIDOL LACTATE PRN MG: 2 SOLUTION, CONCENTRATE ORAL at 14:41

## 2019-06-22 RX ADMIN — DIGOXIN SCH MG: 0.05 SOLUTION ORAL at 09:08

## 2019-06-22 NOTE — INTERNAL MEDICINE PROG NOTE
Internal Medicine Subjective





- Subjective


Service Date: 19


Patient is:: awake, verbal, non-interactive, agitated, confused


Per staff patient has:: no adverse event, no episodes of fall, poor appetite, 

agitated, combative





Internal Medicine Objective





- Results


Recent Labs: 


 Laboratory Last Values











Triglycerides  86 mg/dL (<150)   19  06:45    


 


Cholesterol  158 mg/dL (<200)   19  06:45    


 


LDL Cholesterol Direct  100 mg/dL ()   19  06:45    


 


HDL Cholesterol  38 mg/dL (23-92)   19  06:45    


 


Valproic Acid  35.7 ug/mL (50.0-100.0)  L  19  07:30    














- Physical Exam


Vitals and I&O: 


 Vital Signs











Temp  98.2 F   19 14:20


 


Pulse  96   19 14:20


 


Resp  20   19 14:20


 


BP  128/96   19 14:20


 


Pulse Ox  92   19 14:20








 Intake & Output











 19





 18:59 06:59 18:59


 


Intake Total 480 240 


 


Balance 480 240 


 


Intake:   


 


  Oral 360 240 


 


  Other 120  


 


Other:   


 


  # Voids 3 2 


 


  # Bowel Movements 2 1 











Active Medications: 


Current Medications





Acetaminophen (Tylenol 650mg Supp)  650 mg RC Q4H PRN


   PRN Reason: Mild Pain/Headache/T above 101


   Stop: 19 20:14


Al Hydrox/Mg Hydrox/Simethicone (Maalox)  30 ml PO Q6H PRN


   PRN Reason: Dyspepsia


   Stop: 19 20:14


Albuterol Sulfate (Ventolin)  2 mg PO BID [facility]


   Stop: 08/15/19 08:59


   Last Admin: 19 16:18 Dose:  2 mg


Ascorbic Acid (Vitamin C)  500 mg PO DAILY [facility]


   Stop: 08/15/19 08:59


   Last Admin: 19 09:07 Dose:  500 mg


Betamethasone Dipropionate (Betamethasone Dip 0.05% Cream)  1 appl TP DAILY [facility]


   Stop: 08/15/19 08:59


   Last Admin: 19 09:09 Dose:  1 appl


Clonazepam (Klonopin)  0.5 mg PO BID [facility]; Protocol


   Stop: 19 08:59


   Last Admin: 19 16:18 Dose:  0.5 mg


Clopidogrel Bisulfate (Plavix)  75 mg PO DAILY [facility]


   Stop: 08/15/19 08:59


   Last Admin: 19 09:08 Dose:  75 mg


Digoxin (Lanoxin)  0.125 mg PO DAILY [facility]


   Stop: 08/15/19 08:59


   Last Admin: 19 09:08 Dose:  0.125 mg


Divalproex Sodium (Depakote Er)  500 mg PO Q12HR [facility]; Protocol


   Stop: 08/15/19 08:59


   Last Admin: 19 09:07 Dose:  500 mg


Donepezil HCl (Aricept)  5 mg PO HS [facility]


   Stop: 08/15/19 20:59


   Last Admin: 19 21:10 Dose:  5 mg


Haloperidol Lactate (Haldol)  1 mg PO BID PRN; Protocol


   PRN Reason: Agitation


   Stop: 08/15/19 08:59


   Last Admin: 19 14:41 Dose:  1 mg


Lactulose (Cephulac)  30 gm PO BID [facility]


   Stop: 08/15/19 08:59


   Last Admin: 19 16:18 Dose:  30 gm


Lorazepam (Ativan)  0.5 mg PO Q4HR PRN; Protocol


   PRN Reason: Anxiety/Agitation


   Stop: 07/15/19 20:14


   Last Admin: 19 01:14 Dose:  0.5 mg


Magnesium Hydroxide (Milk Of Magnesia)  30 ml PO HS PRN


   PRN Reason: Constipation


   Stop: 19 20:14


Midodrine (Proamatine)  5 mg PO Q12H [facility]


   Stop: 08/15/19 08:59


   Last Admin: 19 09:09 Dose:  5 mg


Multivitamins/Vitamin C (Theragran)  1 tab PO DAILY [facility]


   Stop: 08/15/19 08:59


   Last Admin: 19 09:08 Dose:  1 tab


Promethazine HCl/Dextromethorphan (Phenergan Dm 6.25/15mg-5 Ml)  5 ml PO TID PRN


   PRN Reason: Cough/ CONGESTION


   Stop: 08/15/19 01:44


Quetiapine Fumarate (Seroquel)  50 mg PO HS [facility]; Protocol


   Stop: 08/15/19 20:59


   Last Admin: 19 21:10 Dose:  50 mg


Quetiapine Fumarate (Seroquel)  25 mg PO TID [facility]; Protocol


   Stop: 08/15/19 08:59


   Last Admin: 19 13:55 Dose:  25 mg


Zinc Sulfate (Zinc Sulfate)  220 mg PO DAILY [facility]


   Stop: 08/15/19 08:59


   Last Admin: 19 09:07 Dose:  220 mg


Zolpidem Tartrate (Ambien)  5 mg PO HS PRN


   PRN Reason: Insomnia


   Stop: 19 20:14


   Last Admin: 19 00:49 Dose:  5 mg








General: demented


HEENT: NC/AT, PERRLA, EOMI


Neck: Supple, No JVD


Lungs: rales


Cardiovascular: RRR, Normal S1, Normal S2


Abdomen: soft, non-tender, non-distended, positive bowel sound


Extremities: excoriation


Neurological: no change





- Procedures


Procedures: 


 Procedures











Procedure Code Date


 


CARDIOPULM RESUSCITA NOS 99.60 10/19/07


 


CL REDUC DISLOC-HAND/FNG 79.74 07


 


CLOSURE SKIN & SUBCUTANEOUS NEC 86.59 09/10/12


 


CONTINUOUS INVASIVE MECHANICAL VENTILATION <96 CONSEC HRS 96.71 10/19/07


 


DESTRUCT EXT EAR LES NEC 18.29 01


 


DIPHTHERIA TOXOID ADMIN 99.36 12


 


EGD BIOPSY SINGLE/MULTIPLE 01860 06/16/10


 


EGD CONTROL BLEEDING ANY 00670 06


 


ELECTROCARDIOGRAM 89.52 01


 


ELECTROCARDIOGRAM COMPLETE 05303 01


 


EMERGENCY DEPT VISIT 34919 12


 


EMERGENCY DEPT VISIT 63336 12


 


ENDOSCOP EXC OR DEST OF LESION OR TISSUE OF ESOPHA 42.33 06


 


ESOPHAGOGASTRODUODENOSCOPY [EGD] W/CLOSED BIOPSY 45.16 06/16/10


 


EXC FACE-MM B9+ALBERTO 2.1-3 CM 22153 01


 


FREEING OF BOWEL ADHESION 00874 08


 


HEART/LUNG RESUSCITATION CPR 20736 10/19/07


 


HERNIA REPAIR W/MESH 57392 12


 


IMMUNIZATION ADMIN 13123 12


 


INJECT/INFUSE NEC 99.29 13


 


INSERT EMERGENCY AIRWAY 05731 10/19/07


 


INSERT ENDOTRACHEAL TUBE 96.04 10/19/07


 


INSERTION OF INFUSION DEV INTO R SUBCLAV VEIN, PERC APPROACH [id number] 16


 


INSJ PICC 5 YR+ W/O IMAGING 58900 16


 


OP RED-INT FIX TIB/FIBUL 79.36 98


 


OTH LYSIS-PERITONEAL ADHES 54.59 12


 


OTHER C.A.T. SCAN 88.38 98


 


OTHER OPEN INCISIONAL HERNIA REPAIR WITH GRAFT OR PROSTHESIS 53.61 12


 


PART SM BOWEL RESECT NEC 45.62 08


 


PERMANENT ILEOSTOMY NEC 46.23 10/19/07


 


RADIOGRAPHIC PROCEDURE 49186 98


 


REMOVAL OF COLON 67787 10/19/07


 


REMOVAL OF SPLEEN TOTAL 10499 10/19/07


 


REPAIR BOWEL OPENING 39242 08


 


RPR S/N/AX/GEN/TRNK 2.5CM/< 72235 12


 


RPR S/N/AX/GEN/TRNK2.6-7.5CM 64092 09/10/12


 


RPR VENTRAL ALLISON INIT REDUC 03297 12


 


SM BOWEL STOMA CLOSURE 46.51 08


 


TD VACCINE > 7 IM 20542 12


 


TETANUS TOXOID ADMINIST 99.38 12


 


THER/PROPH/DIAG INJ IV PUSH 64877 13


 


TOT INTRA-ABD COLECTOMY 45.8 10/19/07


 


TOTAL SPLENECTOMY 41.5 10/19/07


 


TREAT FINGER DISLOCATION 42423 07


 


TREATMENT OF ANKLE FRACTURE 85462 98


 


ULTRASONOGRAPHY OF RIGHT SUBCLAVIAN VEIN, GUIDANCE [id number] 16


 


VENOUS CATHETERIZATION NEC 38.93 06














Internal Medicine Assmt/Plan





- Assessment


Assessment: 





ASSESSMENT AND PLAN: agitation fecal


impaction, low albumin, schizoaffective disorder, leukocytosis, dementia,


hypothyroidism, asthma, degenerative joint disease.  


elevated ammonia pyuria

















- Plan


Plan: 





  We will refer the


patient to Psychiatry as well, adjust the patient's psychotropic medication.  We


will continue monitoring the patient closely.  








Nutritional Asmnt/Malnutr-PDOC





- Dietary Evaluation


Malnutrition Findings (Please click <Entered> for more info): 








Nutritional Asmnt/Malnutrition                             Start:  19 19:

41


Text:                                                      Status: Active      

  


Freq:                                                                          

  


Protocol:                                                                      

  


 Document     19 19:41  FNS.D01  (Rec: 19 19:53  FNS.D01  VEENA-FNS1)


 Nutritional Asmnt/Malnutrition


     Patient General Information


      Nutritional Screening                      Moderate Risk


      Diagnosis                                  psychosis


      Subjective Information                     pt non-verbal, spoke with RN


                                                 who reports patient with good


                                                 appetite, is eating 100% of


                                                 meals, loves strawberry ice


                                                 cream and chicken.


      Current Diet Order/ Nutrition Support      Pureed


      Patient / S.O                              Not Indicated


      Pertinent Medications                      vitamin C, MOM, zinc sulfate,


                                                 theragran


      Pertinent Labs                             Labs reviewed.


     Nutritional Hx/Data


      Height                                     5 ft 7 in


      Height (Calculated Centimeters)            170.2


      Current Weight (lbs)                       160 lb


      Weight (Calculated Kilograms)              72.6


      Weight (Calculated Grams)                  44194.8


      Ideal Body Weight                          61.4 kg, 135 lb


      % Ideal Body Weight                        119


      Body Mass Index (BMI)                      25.0


      Weight Status                              Overweight


     GI Symptoms


      GI Symptoms                                None


      Last BM                                     (soft, x 2)


      Difficult in:                              Chewing


      Food Allergies                             No


      Cultural/Ethnic/Synagogue Belief           unable to assess


      Usual diet at home                         unable to assess


      Skin Integrity/Comment:                    no pressure injury/non-healing


                                                 wounds


      Current %PO                                Good (%)


     Estimated Nutritional Goals


      BEE in Kcals:                              Using Current wt


      Calories/Kcals/Kg                          25-30


      Kcals Calculated                           6011-2860


      Protein:                                   Using Current wt


      Protein g/k-90


      Protein Calculated                         1-1.25


      Fluid: ml                                  4323-1421 (1 ml/kcal)


     Nutritional Problem


      1. Problem


       Problem                                   No nutrition diagnosis at this


                                                 time.


     Intervention/Recommendation


      Comments                                   suggest continue current diet


                                                 rx, pureed, per MD.


     Expected Outcomes/Goals


      Expected Outcomes/Goals                    Maintain PO intakes % of


                                                 meals.


                                                 Electronically Signed by:


                                                 Shira Alonso, MPH, RDN


                                                 Clinical Dietitian 19 7:


                                                 54 PM

## 2019-06-23 RX ADMIN — LACTULOSE SCH GM: 20 SOLUTION ORAL at 09:03

## 2019-06-23 RX ADMIN — HALOPERIDOL LACTATE PRN MG: 2 SOLUTION, CONCENTRATE ORAL at 10:53

## 2019-06-23 RX ADMIN — ALBUTEROL SULFATE SCH MG: 2 SYRUP ORAL at 09:03

## 2019-06-23 RX ADMIN — LACTULOSE SCH GM: 20 SOLUTION ORAL at 16:42

## 2019-06-23 RX ADMIN — ALBUTEROL SULFATE SCH MG: 2 SYRUP ORAL at 16:43

## 2019-06-23 RX ADMIN — DIGOXIN SCH MG: 0.05 SOLUTION ORAL at 09:03

## 2019-06-23 RX ADMIN — HALOPERIDOL LACTATE PRN MG: 2 SOLUTION, CONCENTRATE ORAL at 16:43

## 2019-06-23 NOTE — PROGRESS NOTES
DATE:  06/22/2019



SUBJECTIVE:  The patient was seen and evaluated.  The patient' chart reviewed.



This is Dr. Gutierrez covering for Dr. Lopez.



IDENTIFYING DATA:  A 73-year-old female brought in here for ____ after the

patient presented irritable, inability to follow simple directions and

aggressive.



Medication reconciliation, the patient is currently on albuterol, vitamin C,

Klonopin 0.5 mg p.o. b.i.d., Depakote 500 p.o. b.i.d., ____ 5 mg a day, Haldol 5

mg p.o. b.i.d. p.r.n. for agitation, Seroquel 25 mg p.o. t.i.d. and 50 mg at

nighttime.



Today on face-to-face evaluation, the patient denies any side effects to

medication.  She presented disengaged, withdrawn, does not participate in

interview, at times mumbling to herself.



MENTAL STATUS EXAMINATION:  Mumbling ____.



ASSESSMENT AND PLAN:  The patient is a 73-year-old female who continues to

present disorganized, needing a lot of redirection to maintain a simple

conversation, disengaged, unable to formulate a safe plan.  We will continue

with primary psychiatrist's treatment plan and goals.





DD: 06/22/2019 14:57

DT: 06/23/2019 06:25

[facility]# [id number]  [id number]

## 2019-06-23 NOTE — INTERNAL MEDICINE PROG NOTE
Internal Medicine Subjective





- Subjective


Service Date: 19


Patient is:: awake, verbal, non-interactive, agitated, confused


Per staff patient has:: no adverse event, no episodes of fall, poor appetite, 

agitated, combative





Internal Medicine Objective





- Results


Recent Labs: 


 Laboratory Last Values











Triglycerides  86 mg/dL (<150)   19  06:45    


 


Cholesterol  158 mg/dL (<200)   19  06:45    


 


LDL Cholesterol Direct  100 mg/dL ()   19  06:45    


 


HDL Cholesterol  38 mg/dL (23-92)   19  06:45    


 


Valproic Acid  35.7 ug/mL (50.0-100.0)  L  19  07:30    














- Physical Exam


Vitals and I&O: 


 Vital Signs











Temp  98.2 F   19 14:20


 


Pulse  69   19 09:03


 


Resp  19   19 20:00


 


BP  128/96   19 14:20


 


Pulse Ox  92   19 14:20








 Intake & Output











 19





 18:59 06:59 18:59


 


Intake Total 950  


 


Balance 950  


 


Intake:   


 


  Oral 950  


 


Other:   


 


  # Voids 4  


 


  # Bowel Movements 1  











Active Medications: 


Current Medications





Acetaminophen (Tylenol 650mg Supp)  650 mg RC Q4H PRN


   PRN Reason: Mild Pain/Headache/T above 101


   Stop: 19 20:14


Al Hydrox/Mg Hydrox/Simethicone (Maalox)  30 ml PO Q6H PRN


   PRN Reason: Dyspepsia


   Stop: 19 20:14


Albuterol Sulfate (Ventolin)  2 mg PO BID [facility]


   Stop: 08/15/19 08:59


   Last Admin: 19 09:03 Dose:  2 mg


Ascorbic Acid (Vitamin C)  500 mg PO DAILY [facility]


   Stop: 08/15/19 08:59


   Last Admin: 19 09:04 Dose:  500 mg


Betamethasone Dipropionate (Betamethasone Dip 0.05% Cream)  1 appl TP DAILY [facility]


   Stop: 08/15/19 08:59


   Last Admin: 19 09:06 Dose:  1 appl


Clonazepam (Klonopin)  0.5 mg PO BID [facility]; Protocol


   Stop: 19 08:59


   Last Admin: 19 09:03 Dose:  0.5 mg


Clopidogrel Bisulfate (Plavix)  75 mg PO DAILY [facility]


   Stop: 08/15/19 08:59


   Last Admin: 19 09:04 Dose:  75 mg


Digoxin (Lanoxin)  0.125 mg PO DAILY [facility]


   Stop: 08/15/19 08:59


   Last Admin: 19 09:03 Dose:  0.125 mg


Divalproex Sodium (Depakote Er)  500 mg PO Q12HR [facility]; Protocol


   Stop: 08/15/19 08:59


   Last Admin: 19 09:04 Dose:  500 mg


Donepezil HCl (Aricept)  5 mg PO HS [facility]


   Stop: 08/15/19 20:59


   Last Admin: 19 21:12 Dose:  5 mg


Haloperidol Lactate (Haldol)  1 mg PO BID [facility]; Protocol


   PRN Reason: Agitation


   Stop: 08/15/19 08:59


   Last Admin: 19 10:53 Dose:  1 mg


Lactulose (Cephulac)  30 gm PO BID [facility]


   Stop: 08/15/19 08:59


   Last Admin: 19 09:03 Dose:  30 gm


Lorazepam (Ativan)  0.5 mg PO Q4HR PRN; Protocol


   PRN Reason: Anxiety/Agitation


   Stop: 07/15/19 20:14


   Last Admin: 19 01:14 Dose:  0.5 mg


Magnesium Hydroxide (Milk Of Magnesia)  30 ml PO HS PRN


   PRN Reason: Constipation


   Stop: 19 20:14


Midodrine (Proamatine)  5 mg PO Q12H [facility]


   Stop: 08/15/19 08:59


   Last Admin: 19 09:05 Dose:  5 mg


Multivitamins/Vitamin C (Theragran)  1 tab PO DAILY [facility]


   Stop: 08/15/19 08:59


   Last Admin: 19 09:04 Dose:  1 tab


Promethazine HCl/Dextromethorphan (Phenergan Dm 6.25/15mg-5 Ml)  5 ml PO TID PRN


   PRN Reason: Cough/ CONGESTION


   Stop: 08/15/19 01:44


Quetiapine Fumarate (Seroquel)  50 mg PO HS [facility]; Protocol


   Stop: 08/15/19 20:59


   Last Admin: 19 21:14 Dose:  50 mg


Quetiapine Fumarate (Seroquel)  25 mg PO TID [facility]; Protocol


   Stop: 08/15/19 08:59


   Last Admin: 19 13:18 Dose:  25 mg


Zinc Sulfate (Zinc Sulfate)  220 mg PO DAILY [facility]


   Stop: 08/15/19 08:59


   Last Admin: 19 09:04 Dose:  220 mg


Zolpidem Tartrate (Ambien)  5 mg PO HS PRN


   PRN Reason: Insomnia


   Stop: 19 20:14


   Last Admin: 19 21:15 Dose:  5 mg








General: demented


HEENT: NC/AT, PERRLA, EOMI


Neck: Supple, No JVD


Lungs: rales


Cardiovascular: RRR, Normal S1, Normal S2


Abdomen: soft, non-tender, non-distended, positive bowel sound


Extremities: excoriation


Neurological: no change





- Procedures


Procedures: 


 Procedures











Procedure Code Date


 


CARDIOPULM RESUSCITA NOS 99.60 10/19/07


 


CL REDUC DISLOC-HAND/FNG 79.74 07


 


CLOSURE SKIN & SUBCUTANEOUS NEC 86.59 09/10/12


 


CONTINUOUS INVASIVE MECHANICAL VENTILATION <96 CONSEC HRS 96.71 10/19/07


 


DESTRUCT EXT EAR LES NEC 18.29 01


 


DIPHTHERIA TOXOID ADMIN 99.36 12


 


EGD BIOPSY SINGLE/MULTIPLE 34727 06/16/10


 


EGD CONTROL BLEEDING ANY 76722 06


 


ELECTROCARDIOGRAM 89.52 01


 


ELECTROCARDIOGRAM COMPLETE 55090 01


 


EMERGENCY DEPT VISIT 88715 12


 


EMERGENCY DEPT VISIT 82279 12


 


ENDOSCOP EXC OR DEST OF LESION OR TISSUE OF ESOPHA 42.33 06


 


ESOPHAGOGASTRODUODENOSCOPY [EGD] W/CLOSED BIOPSY 45.16 06/16/10


 


EXC FACE-MM B9+ALBERTO 2.1-3 CM 03907 01


 


FREEING OF BOWEL ADHESION 09895 08


 


HEART/LUNG RESUSCITATION CPR 09088 10/19/07


 


HERNIA REPAIR W/MESH 44542 12


 


IMMUNIZATION ADMIN 29981 12


 


INJECT/INFUSE NEC 99.29 13


 


INSERT EMERGENCY AIRWAY 10488 10/19/07


 


INSERT ENDOTRACHEAL TUBE 96.04 10/19/07


 


INSERTION OF INFUSION DEV INTO R SUBCLAV VEIN, PERC APPROACH [id number] 16


 


INSJ PICC 5 YR+ W/O IMAGING 07801 16


 


OP RED-INT FIX TIB/FIBUL 79.36 98


 


OTH LYSIS-PERITONEAL ADHES 54.59 12


 


OTHER C.A.T. SCAN 88.38 98


 


OTHER OPEN INCISIONAL HERNIA REPAIR WITH GRAFT OR PROSTHESIS 53.61 12


 


PART SM BOWEL RESECT NEC 45.62 08


 


PERMANENT ILEOSTOMY NEC 46.23 10/19/07


 


RADIOGRAPHIC PROCEDURE 25297 98


 


REMOVAL OF COLON 71020 10/19/07


 


REMOVAL OF SPLEEN TOTAL 66336 10/19/07


 


REPAIR BOWEL OPENING 53123 08


 


RPR S/N/AX/GEN/TRNK 2.5CM/< 02401 12


 


RPR S/N/AX/GEN/TRNK2.6-7.5CM 10176 09/10/12


 


RPR VENTRAL ALLISON INIT REDUC 56305 12


 


SM BOWEL STOMA CLOSURE 46.51 08


 


TD VACCINE > 7 IM 01494 12


 


TETANUS TOXOID ADMINIST 99.38 12


 


THER/PROPH/DIAG INJ IV PUSH 70421 13


 


TOT INTRA-ABD COLECTOMY 45.8 10/19/07


 


TOTAL SPLENECTOMY 41.5 10/19/07


 


TREAT FINGER DISLOCATION 79024 07


 


TREATMENT OF ANKLE FRACTURE 15659 98


 


ULTRASONOGRAPHY OF RIGHT SUBCLAVIAN VEIN, GUIDANCE [id number] 16


 


VENOUS CATHETERIZATION NEC 38.93 06














Internal Medicine Assmt/Plan





- Assessment


Assessment: 





ASSESSMENT AND PLAN: agitation fecal


impaction, low albumin, schizoaffective disorder, leukocytosis, dementia,


hypothyroidism, asthma, degenerative joint disease.  


elevated ammonia pyuria

















- Plan


Plan: 





  We will refer the


patient to Psychiatry as well, adjust the patient's psychotropic medication.  We


will continue monitoring the patient closely.  








Nutritional Asmnt/Malnutr-PDOC





- Dietary Evaluation


Malnutrition Findings (Please click <Entered> for more info): 








Nutritional Asmnt/Malnutrition                             Start:  19 19:

41


Text:                                                      Status: Active      

  


Freq:                                                                          

  


Protocol:                                                                      

  


 Document     19 19:41  FNS.D01  (Rec: 19 19:53  FNS.D01  VEENA-FNS1)


 Nutritional Asmnt/Malnutrition


     Patient General Information


      Nutritional Screening                      Moderate Risk


      Diagnosis                                  psychosis


      Subjective Information                     pt non-verbal, spoke with RN


                                                 who reports patient with good


                                                 appetite, is eating 100% of


                                                 meals, loves strawberry ice


                                                 cream and chicken.


      Current Diet Order/ Nutrition Support      Pureed


      Patient / S.O                              Not Indicated


      Pertinent Medications                      vitamin C, MOM, zinc sulfate,


                                                 theragran


      Pertinent Labs                             Labs reviewed.


     Nutritional Hx/Data


      Height                                     5 ft 7 in


      Height (Calculated Centimeters)            170.2


      Current Weight (lbs)                       160 lb


      Weight (Calculated Kilograms)              72.6


      Weight (Calculated Grams)                  80657.8


      Ideal Body Weight                          61.4 kg, 135 lb


      % Ideal Body Weight                        119


      Body Mass Index (BMI)                      25.0


      Weight Status                              Overweight


     GI Symptoms


      GI Symptoms                                None


      Last BM                                     (soft, x 2)


      Difficult in:                              Chewing


      Food Allergies                             No


      Cultural/Ethnic/Mandaeism Belief           unable to assess


      Usual diet at home                         unable to assess


      Skin Integrity/Comment:                    no pressure injury/non-healing


                                                 wounds


      Current %PO                                Good (%)


     Estimated Nutritional Goals


      BEE in Kcals:                              Using Current wt


      Calories/Kcals/Kg                          25-30


      Kcals Calculated                           9231-6139


      Protein:                                   Using Current wt


      Protein g/k-90


      Protein Calculated                         1-1.25


      Fluid: ml                                  5281-8827 (1 ml/kcal)


     Nutritional Problem


      1. Problem


       Problem                                   No nutrition diagnosis at this


                                                 time.


     Intervention/Recommendation


      Comments                                   suggest continue current diet


                                                 rx, pureed, per MD.


     Expected Outcomes/Goals


      Expected Outcomes/Goals                    Maintain PO intakes % of


                                                 meals.


                                                 Electronically Signed by:


                                                 Shira Alonso, MPH, RDN


                                                 Clinical Dietitian 19 7:


                                                 54 PM

## 2019-06-24 RX ADMIN — ALBUTEROL SULFATE SCH MG: 2 SYRUP ORAL at 09:24

## 2019-06-24 RX ADMIN — ALBUTEROL SULFATE SCH: 2 SYRUP ORAL at 18:46

## 2019-06-24 RX ADMIN — DIGOXIN SCH: 0.05 SOLUTION ORAL at 18:47

## 2019-06-24 RX ADMIN — LACTULOSE SCH: 20 SOLUTION ORAL at 18:47

## 2019-06-24 RX ADMIN — LACTULOSE SCH: 20 SOLUTION ORAL at 18:49

## 2019-06-24 NOTE — PROGRESS NOTES
DATE:  06/24/2019



Case was discussed with staff of the patient, reviewed records.  Covering for

Dr. Lopez.  This is a 73-year-old female who was admitted on 06/15/2019.  She

has been yelling and screaming, transferred from [facility].  She has been

agitated, ____ directions, trying to pull her IV, unpredictable, impulsive,

needing redirection, angry and severely agitated.  The patient continues to be

impulsive and unpredictable.  She is currently on Depakote 500 mg twice a day,

Haldol 5 mg twice a day as needed, Klonopin 0.5 mg twice a day, Seroquel 25 mg 3

times a day and 50 mg at bedtime.  No side effects, no sedation, no nausea, no

extrapyramidal symptoms.  We will continue to work with the patient in group

therapy, milieu therapy, and adjust medication as needed.





DD: 06/24/2019 14:02

DT: 06/24/2019 17:24

JOB# [id number]  [id number]

## 2019-06-24 NOTE — PROGRESS NOTES
DATE:  06/23/2019



SUBJECTIVE:  The patient was seen and evaluated.  The patient was interviewed.



I am covering for Dr. Lopez.



____ overnight.  The patient will have intermittent episodes of yelling and

needed redirection.  Today on face-to-face evaluation, continues to be needing

redirection, constant yelling but difficult to engage in conversation as she

mostly just derails in conversation.



ASSESSMENT AND PLAN:   Dementia with severe disorganized thought process.  We

will continue with the current medication regimen of Depakote and Seroquel as

she is able to tolerate them with the recent adjustments of Klonopin.





DD: 06/23/2019 06:36

DT: 06/23/2019 21:04

JOB# [id number]  [id number]

## 2019-06-24 NOTE — INTERNAL MEDICINE PROG NOTE
Internal Medicine Subjective





- Subjective


Patient seen and examined:: with staff, chart reviewed


Patient is:: awake, verbal, non-interactive, agitated, confused


Per staff patient has:: no adverse event, no episodes of fall, poor appetite, 

agitated, combative





Internal Medicine Objective





- Results


Recent Labs: 


 Laboratory Last Values











Triglycerides  86 mg/dL (<150)   19  06:45    


 


Cholesterol  158 mg/dL (<200)   19  06:45    


 


LDL Cholesterol Direct  100 mg/dL ()   19  06:45    


 


HDL Cholesterol  38 mg/dL (23-92)   19  06:45    


 


Valproic Acid  35.7 ug/mL (50.0-100.0)  L  19  07:30    














- Physical Exam


Vitals and I&O: 


 Vital Signs











Temp  97.9 F   19 06:31


 


Pulse  70   19 06:31


 


Resp  18   19 06:31


 


BP  126/76   19 06:31


 


Pulse Ox  91   19 06:31








 Intake & Output











 19





 18:59 06:59 18:59


 


Intake Total 600 120 


 


Output Total  1 


 


Balance 600 119 


 


Intake:   


 


  Oral 480 120 


 


  Other 120  


 


Output:   


 


  Urine/Stool Mix  1 


 


Other:   


 


  # Voids 3 3 


 


  # Bowel Movements 0 0 


 


  Stool Characteristics   Soft











Active Medications: 


Current Medications





Acetaminophen (Tylenol 650mg Supp)  650 mg RC Q4H PRN


   PRN Reason: Mild Pain/Headache/T above 101


   Stop: 19 20:14


Al Hydrox/Mg Hydrox/Simethicone (Maalox)  30 ml PO Q6H PRN


   PRN Reason: Dyspepsia


   Stop: 19 20:14


Albuterol Sulfate (Ventolin)  2 mg PO BID [facility]


   Stop: 08/15/19 08:59


   Last Admin: 19 09:24 Dose:  2 mg


Ascorbic Acid (Vitamin C)  500 mg PO DAILY [facility]


   Stop: 08/15/19 08:59


   Last Admin: 19 09:22 Dose:  500 mg


Betamethasone Dipropionate (Betamethasone Dip 0.05% Cream)  1 appl TP DAILY [facility]


   Stop: 08/15/19 08:59


   Last Admin: 19 09:06 Dose:  1 appl


Clonazepam (Klonopin)  0.5 mg PO BID [facility]; Protocol


   Stop: 19 08:59


   Last Admin: 19 09:22 Dose:  0.5 mg


Clopidogrel Bisulfate (Plavix)  75 mg PO DAILY [facility]


   Stop: 08/15/19 08:59


   Last Admin: 19 09:21 Dose:  75 mg


Digoxin (Lanoxin)  0.125 mg PO DAILY [facility]


   Stop: 08/15/19 08:59


   Last Admin: 19 09:03 Dose:  0.125 mg


Divalproex Sodium (Depakote Er)  500 mg PO Q12HR [facility]; Protocol


   Stop: 08/15/19 08:59


   Last Admin: 19 09:22 Dose:  500 mg


Donepezil HCl (Aricept)  5 mg PO HS [facility]


   Stop: 08/15/19 20:59


   Last Admin: 19 21:54 Dose:  5 mg


Haloperidol Lactate (Haldol)  1 mg PO BID PRN; Protocol


   PRN Reason: Agitation


   Stop: 08/15/19 08:59


   Last Admin: 19 16:43 Dose:  1 mg


Lactulose (Cephulac)  30 gm PO BID [facility]


   Stop: 08/15/19 08:59


   Last Admin: 19 16:42 Dose:  30 gm


Lorazepam (Ativan)  0.5 mg PO Q4HR PRN; Protocol


   PRN Reason: Anxiety/Agitation


   Stop: 07/15/19 20:14


   Last Admin: 19 01:14 Dose:  0.5 mg


Magnesium Hydroxide (Milk Of Magnesia)  30 ml PO HS PRN


   PRN Reason: Constipation


   Stop: 19 20:14


Midodrine (Proamatine)  5 mg PO Q12H [facility]


   Stop: 08/15/19 08:59


   Last Admin: 19 09:24 Dose:  5 mg


Multivitamins/Vitamin C (Theragran)  1 tab PO DAILY [facility]


   Stop: 08/15/19 08:59


   Last Admin: 19 09:21 Dose:  1 tab


Promethazine HCl/Dextromethorphan (Phenergan Dm 6.25/15mg-5 Ml)  5 ml PO TID PRN


   PRN Reason: Cough/ CONGESTION


   Stop: 08/15/19 01:44


Quetiapine Fumarate (Seroquel)  50 mg PO HS [facility]; Protocol


   Stop: 08/15/19 20:59


   Last Admin: 19 21:53 Dose:  50 mg


Quetiapine Fumarate (Seroquel)  25 mg PO TID [facility]; Protocol


   Stop: 08/15/19 08:59


   Last Admin: 19 09:22 Dose:  25 mg


Zinc Sulfate (Zinc Sulfate)  220 mg PO DAILY [facility]


   Stop: 08/15/19 08:59


   Last Admin: 19 09:22 Dose:  220 mg


Zolpidem Tartrate (Ambien)  5 mg PO HS PRN


   PRN Reason: Insomnia


   Stop: 19 20:14


   Last Admin: 19 21:15 Dose:  5 mg








General: demented


HEENT: NC/AT, PERRLA, EOMI


Neck: Supple, No JVD


Lungs: rales


Cardiovascular: RRR, Normal S1, Normal S2


Abdomen: soft, non-tender, non-distended, positive bowel sound


Extremities: excoriation


Neurological: no change





- Procedures


Procedures: 


 Procedures











Procedure Code Date


 


CARDIOPULM RESUSCITA NOS 99.60 10/19/07


 


CL REDUC DISLOC-HAND/FNG 79.74 07


 


CLOSURE SKIN & SUBCUTANEOUS NEC 86.59 09/10/12


 


CONTINUOUS INVASIVE MECHANICAL VENTILATION <96 CONSEC HRS 96.71 10/19/07


 


DESTRUCT EXT EAR LES NEC 18.29 01


 


DIPHTHERIA TOXOID ADMIN 99.36 12


 


EGD BIOPSY SINGLE/MULTIPLE 47034 06/16/10


 


EGD CONTROL BLEEDING ANY 74894 06


 


ELECTROCARDIOGRAM 89.52 01


 


ELECTROCARDIOGRAM COMPLETE 77312 01


 


EMERGENCY DEPT VISIT 49460 12


 


EMERGENCY DEPT VISIT 44598 12


 


ENDOSCOP EXC OR DEST OF LESION OR TISSUE OF ESOPHA 42.33 06


 


ESOPHAGOGASTRODUODENOSCOPY [EGD] W/CLOSED BIOPSY 45.16 06/16/10


 


EXC FACE-MM B9+ALBERTO 2.1-3 CM 47968 01


 


FREEING OF BOWEL ADHESION 27112 08


 


HEART/LUNG RESUSCITATION CPR 67046 10/19/07


 


HERNIA REPAIR W/MESH 84724 12


 


IMMUNIZATION ADMIN 97798 12


 


INJECT/INFUSE NEC 99.29 13


 


INSERT EMERGENCY AIRWAY 01882 10/19/07


 


INSERT ENDOTRACHEAL TUBE 96.04 10/19/07


 


INSERTION OF INFUSION DEV INTO R SUBCLAV VEIN, PERC APPROACH 71I564M 16


 


INSJ PICC 5 YR+ W/O IMAGING 11278 16


 


OP RED-INT FIX TIB/FIBUL 79.36 98


 


OTH LYSIS-PERITONEAL ADHES 54.59 12


 


OTHER C.A.T. SCAN 88.38 98


 


OTHER OPEN INCISIONAL HERNIA REPAIR WITH GRAFT OR PROSTHESIS 53.61 12


 


PART SM BOWEL RESECT NEC 45.62 08


 


PERMANENT ILEOSTOMY NEC 46.23 10/19/07


 


RADIOGRAPHIC PROCEDURE 99239 98


 


REMOVAL OF COLON 50735 10/19/07


 


REMOVAL OF SPLEEN TOTAL 26843 10/19/07


 


REPAIR BOWEL OPENING 83930 08


 


RPR S/N/AX/GEN/TRNK 2.5CM/< 65410 12


 


RPR S/N/AX/GEN/TRNK2.6-7.5CM 09789 09/10/12


 


RPR VENTRAL ALLISON INIT REDUC 68007 12


 


SM BOWEL STOMA CLOSURE 46.51 08


 


TD VACCINE > 7 IM 42741 12


 


TETANUS TOXOID ADMINIST 99.38 12


 


THER/PROPH/DIAG INJ IV PUSH 85074 13


 


TOT INTRA-ABD COLECTOMY 45.8 10/19/07


 


TOTAL SPLENECTOMY 41.5 10/19/07


 


TREAT FINGER DISLOCATION 23445 07


 


TREATMENT OF ANKLE FRACTURE 75660 98


 


ULTRASONOGRAPHY OF RIGHT SUBCLAVIAN VEIN, GUIDANCE [id number] 16


 


VENOUS CATHETERIZATION NEC 38.93 06














Internal Medicine Assmt/Plan





- Assessment


Assessment: 





ASSESSMENT AND PLAN: agitation fecal


impaction, low albumin, schizoaffective disorder, leukocytosis, dementia,


hypothyroidism, asthma, degenerative joint disease.  


elevated ammonia pyuria











- Plan


Plan: 








- Plan


Plan: 





PLAN:   We will continue the


patient on oxygen and bronchodilator treatments.    We will review the patient's


chest x-ray and will review the patient's urine culture.  We will refer the


patient to Psychiatry as well, adjust the patient's psychotropic medication.  We


will continue monitoring the patient closely.  


add lactulose


on short course levaquin





Nutritional Asmnt/Malnutr-PDOC





- Dietary Evaluation


Malnutrition Findings (Please click <Entered> for more info): 








Nutritional Asmnt/Malnutrition                             Start:  19 19:

41


Text:                                                      Status: Active      

  


Freq:                                                                          

  


Protocol:                                                                      

  


 Document     19 19:41  FNS.D01  (Rec: 19 19:53  FNS.D01  VEENA-FNS1)


 Nutritional Asmnt/Malnutrition


     Patient General Information


      Nutritional Screening                      Moderate Risk


      Diagnosis                                  psychosis


      Subjective Information                     pt non-verbal, spoke with RN


                                                 who reports patient with good


                                                 appetite, is eating 100% of


                                                 meals, loves strawberry ice


                                                 cream and chicken.


      Current Diet Order/ Nutrition Support      Pureed


      Patient / S.O                              Not Indicated


      Pertinent Medications                      vitamin C, MOM, zinc sulfate,


                                                 theragran


      Pertinent Labs                             Labs reviewed.


     Nutritional Hx/Data


      Height                                     1.7 m


      Height (Calculated Centimeters)            170.2


      Current Weight (lbs)                       72.575 kg


      Weight (Calculated Kilograms)              72.6


      Weight (Calculated Grams)                  59592.8


      Ideal Body Weight                          61.4 kg, 135 lb


      % Ideal Body Weight                        119


      Body Mass Index (BMI)                      25.0


      Weight Status                              Overweight


     GI Symptoms


      GI Symptoms                                None


      Last BM                                     (soft, x 2)


      Difficult in:                              Chewing


      Food Allergies                             No


      Cultural/Ethnic/Jainism Belief           unable to assess


      Usual diet at home                         unable to assess


      Skin Integrity/Comment:                    no pressure injury/non-healing


                                                 wounds


      Current %PO                                Good (%)


     Estimated Nutritional Goals


      BEE in Kcals:                              Using Current wt


      Calories/Kcals/Kg                          25-30


      Kcals Calculated                           1796-1094


      Protein:                                   Using Current wt


      Protein g/k-90


      Protein Calculated                         1-1.25


      Fluid: ml                                  3971-9637 (1 ml/kcal)


     Nutritional Problem


      1. Problem


       Problem                                   No nutrition diagnosis at this


                                                 time.


     Intervention/Recommendation


      Comments                                   suggest continue current diet


                                                 rx, pureed, per MD.


     Expected Outcomes/Goals


      Expected Outcomes/Goals                    Maintain PO intakes % of


                                                 meals.


                                                 Electronically Signed by:


                                                 Shira Alonso, MPH, RDN


                                                 Clinical Dietitian 19 7:


                                                 54 PM

## 2019-06-25 RX ADMIN — LACTULOSE SCH: 20 SOLUTION ORAL at 17:41

## 2019-06-25 RX ADMIN — ALBUTEROL SULFATE SCH MG: 2 SYRUP ORAL at 09:54

## 2019-06-25 RX ADMIN — ALBUTEROL SULFATE SCH MG: 2 SYRUP ORAL at 16:58

## 2019-06-25 RX ADMIN — HALOPERIDOL LACTATE PRN MG: 2 SOLUTION, CONCENTRATE ORAL at 17:42

## 2019-06-25 RX ADMIN — DIGOXIN SCH: 0.05 SOLUTION ORAL at 09:56

## 2019-06-25 RX ADMIN — LACTULOSE SCH: 20 SOLUTION ORAL at 09:57

## 2019-06-25 NOTE — INTERNAL MEDICINE PROG NOTE
Internal Medicine Subjective





- Subjective


Patient seen and examined:: with staff, chart reviewed


Patient is:: awake, verbal, non-interactive, agitated, confused


Per staff patient has:: no adverse event, no episodes of fall, poor appetite, 

agitated, combative





Internal Medicine Objective





- Results


Recent Labs: 


 Laboratory Last Values











Triglycerides  86 mg/dL (<150)   19  06:45    


 


Cholesterol  158 mg/dL (<200)   19  06:45    


 


LDL Cholesterol Direct  100 mg/dL ()   19  06:45    


 


HDL Cholesterol  38 mg/dL (23-92)   19  06:45    


 


Valproic Acid  35.7 ug/mL (50.0-100.0)  L  19  07:30    














- Physical Exam


Vitals and I&O: 


 Vital Signs











Temp  97.8 F   19 06:21


 


Pulse  63   19 06:21


 


Resp  19   19 06:21


 


BP  115/54   19 06:21


 


Pulse Ox  96   19 06:21








 Intake & Output











 19





 18:59 06:59 18:59


 


Intake Total 720 120 


 


Output Total  1 


 


Balance 720 119 


 


Intake:   


 


  Oral 720 120 


 


Output:   


 


  Urine/Stool Mix  1 


 


Other:   


 


  # Voids 4 3 


 


  # Bowel Movements 2 0 


 


  Stool Characteristics Soft  











Active Medications: 


Current Medications





Acetaminophen (Tylenol 650mg Supp)  650 mg RC Q4H PRN


   PRN Reason: Mild Pain/Headache/T above 101


   Stop: 19 20:14


Al Hydrox/Mg Hydrox/Simethicone (Maalox)  30 ml PO Q6H PRN


   PRN Reason: Dyspepsia


   Stop: 19 20:14


Albuterol Sulfate (Ventolin)  2 mg PO BID [facility]


   Stop: 08/15/19 08:59


   Last Admin: 19 09:54 Dose:  2 mg


Ascorbic Acid (Vitamin C)  500 mg PO DAILY [facility]


   Stop: 08/15/19 08:59


   Last Admin: 19 09:54 Dose:  500 mg


Betamethasone Dipropionate (Betamethasone Dip 0.05% Cream)  1 appl TP DAILY [facility]


   Stop: 08/15/19 08:59


   Last Admin: 19 09:56 Dose:  Not Given


Clonazepam (Klonopin)  0.5 mg PO BID [facility]; Protocol


   Stop: 19 08:59


   Last Admin: 19 09:54 Dose:  0.5 mg


Clopidogrel Bisulfate (Plavix)  75 mg PO DAILY [facility]


   Stop: 08/15/19 08:59


   Last Admin: 19 09:54 Dose:  75 mg


Digoxin (Lanoxin)  0.125 mg PO DAILY [facility]


   Stop: 08/15/19 08:59


   Last Admin: 19 09:56 Dose:  Not Given


Divalproex Sodium (Depakote Er)  500 mg PO Q12HR [facility]; Protocol


   Stop: 08/15/19 08:59


   Last Admin: 19 09:54 Dose:  500 mg


Donepezil HCl (Aricept)  5 mg PO HS [facility]


   Stop: 08/15/19 20:59


   Last Admin: 19 21:59 Dose:  5 mg


Haloperidol Lactate (Haldol)  1 mg PO BID PRN; Protocol


   PRN Reason: Agitation


   Stop: 08/15/19 08:59


   Last Admin: 19 16:43 Dose:  1 mg


Lactulose (Cephulac)  30 gm PO BID [facility]


   Stop: 08/15/19 08:59


   Last Admin: 19 09:57 Dose:  Not Given


Lorazepam (Ativan)  0.5 mg PO Q4HR PRN; Protocol


   PRN Reason: Anxiety/Agitation


   Stop: 07/15/19 20:14


   Last Admin: 19 01:14 Dose:  0.5 mg


Magnesium Hydroxide (Milk Of Magnesia)  30 ml PO HS PRN


   PRN Reason: Constipation


   Stop: 19 20:14


Midodrine (Proamatine)  5 mg PO Q12H [facility]


   Stop: 08/15/19 08:59


   Last Admin: 19 09:56 Dose:  Not Given


Multivitamins/Vitamin C (Theragran)  1 tab PO DAILY [facility]


   Stop: 08/15/19 08:59


   Last Admin: 19 09:54 Dose:  1 tab


Promethazine HCl/Dextromethorphan (Phenergan Dm 6.25/15mg-5 Ml)  5 ml PO TID PRN


   PRN Reason: Cough/ CONGESTION


   Stop: 08/15/19 01:44


Quetiapine Fumarate (Seroquel)  50 mg PO HS [facility]; Protocol


   Stop: 08/15/19 20:59


   Last Admin: 19 21:58 Dose:  50 mg


Quetiapine Fumarate (Seroquel)  25 mg PO TID [facility]; Protocol


   Stop: 08/15/19 08:59


   Last Admin: 19 09:54 Dose:  25 mg


Zinc Sulfate (Zinc Sulfate)  220 mg PO DAILY [facility]


   Stop: 08/15/19 08:59


   Last Admin: 19 09:54 Dose:  220 mg


Zolpidem Tartrate (Ambien)  5 mg PO HS PRN


   PRN Reason: Insomnia


   Stop: 19 20:14


   Last Admin: 19 21:58 Dose:  5 mg








General: demented


HEENT: NC/AT, PERRLA, EOMI


Neck: Supple, No JVD


Lungs: rales


Cardiovascular: RRR, Normal S1, Normal S2


Abdomen: soft, non-tender, non-distended, positive bowel sound


Extremities: excoriation


Neurological: no change





- Procedures


Procedures: 


 Procedures











Procedure Code Date


 


CARDIOPULM RESUSCITA NOS 99.60 10/19/07


 


CL REDUC DISLOC-HAND/FNG 79.74 07


 


CLOSURE SKIN & SUBCUTANEOUS NEC 86.59 09/10/12


 


CONTINUOUS INVASIVE MECHANICAL VENTILATION <96 CONSEC HRS 96.71 10/19/07


 


DESTRUCT EXT EAR LES NEC 18.29 01


 


DIPHTHERIA TOXOID ADMIN 99.36 12


 


EGD BIOPSY SINGLE/MULTIPLE 83203 06/16/10


 


EGD CONTROL BLEEDING ANY 53536 06


 


ELECTROCARDIOGRAM 89.52 01


 


ELECTROCARDIOGRAM COMPLETE 90914 01


 


EMERGENCY DEPT VISIT 20461 12


 


EMERGENCY DEPT VISIT 99118 12


 


ENDOSCOP EXC OR DEST OF LESION OR TISSUE OF ESOPHA 42.33 06


 


ESOPHAGOGASTRODUODENOSCOPY [EGD] W/CLOSED BIOPSY 45.16 06/16/10


 


EXC FACE-MM B9+ALBERTO 2.1-3 CM 05194 01


 


FREEING OF BOWEL ADHESION 65088 08


 


HEART/LUNG RESUSCITATION CPR 02050 10/19/07


 


HERNIA REPAIR W/MESH 52347 12


 


IMMUNIZATION ADMIN 56471 12


 


INJECT/INFUSE NEC 99.29 13


 


INSERT EMERGENCY AIRWAY 68171 10/19/07


 


INSERT ENDOTRACHEAL TUBE 96.04 10/19/07


 


INSERTION OF INFUSION DEV INTO R SUBCLAV VEIN, PERC APPROACH [id number] 16


 


INSJ PICC 5 YR+ W/O IMAGING 34829 16


 


OP RED-INT FIX TIB/FIBUL 79.36 98


 


OTH LYSIS-PERITONEAL ADHES 54.59 12


 


OTHER C.A.T. SCAN 88.38 98


 


OTHER OPEN INCISIONAL HERNIA REPAIR WITH GRAFT OR PROSTHESIS 53.61 12


 


PART SM BOWEL RESECT NEC 45.62 08


 


PERMANENT ILEOSTOMY NEC 46.23 10/19/07


 


RADIOGRAPHIC PROCEDURE 90226 98


 


REMOVAL OF COLON 58485 10/19/07


 


REMOVAL OF SPLEEN TOTAL 22255 10/19/07


 


REPAIR BOWEL OPENING 83663 08


 


RPR S/N/AX/GEN/TRNK 2.5CM/< 02029 12


 


RPR S/N/AX/GEN/TRNK2.6-7.5CM 73802 09/10/12


 


RPR VENTRAL ALLISON INIT REDUC 25428 12


 


SM BOWEL STOMA CLOSURE 46.51 08


 


TD VACCINE > 7 IM 39017 12


 


TETANUS TOXOID ADMINIST 99.38 12


 


THER/PROPH/DIAG INJ IV PUSH 38498 13


 


TOT INTRA-ABD COLECTOMY 45.8 10/19/07


 


TOTAL SPLENECTOMY 41.5 10/19/07


 


TREAT FINGER DISLOCATION 69110 07


 


TREATMENT OF ANKLE FRACTURE 10299 98


 


ULTRASONOGRAPHY OF RIGHT SUBCLAVIAN VEIN, GUIDANCE [id number] 16


 


VENOUS CATHETERIZATION NEC 38.93 06














Internal Medicine Assmt/Plan





- Assessment


Assessment: 





ASSESSMENT AND PLAN: agitation fecal


impaction, low albumin, schizoaffective disorder, leukocytosis, dementia,


hypothyroidism, asthma, degenerative joint disease.  


elevated ammonia pyuria











- Plan


Plan: 








- Plan


Plan: 





PLAN:   We will continue the


patient on oxygen and bronchodilator treatments.    We will review the patient's


chest x-ray and will review the patient's urine culture.  We will refer the


patient to Psychiatry as well, adjust the patient's psychotropic medication.  We


will continue monitoring the patient closely.  


add lactulose


on short course levaquin





Nutritional Asmnt/Malnutr-PDOC





- Dietary Evaluation


Malnutrition Findings (Please click <Entered> for more info): 








Nutritional Asmnt/Malnutrition                             Start:  19 19:

41


Text:                                                      Status: Active      

  


Freq:                                                                          

  


Protocol:                                                                      

  


 Document     19 19:41  FNS.D01  (Rec: 19 19:53  FNS.D01  [id number])


 Nutritional Asmnt/Malnutrition


     Patient General Information


      Nutritional Screening                      Moderate Risk


      Diagnosis                                  psychosis


      Subjective Information                     pt non-verbal, spoke with RN


                                                 who reports patient with good


                                                 appetite, is eating 100% of


                                                 meals, loves strawberry ice


                                                 cream and chicken.


      Current Diet Order/ Nutrition Support      Pureed


      Patient / S.O                              Not Indicated


      Pertinent Medications                      vitamin C, MOM, zinc sulfate,


                                                 theragran


      Pertinent Labs                             Labs reviewed.


     Nutritional Hx/Data


      Height                                     1.7 m


      Height (Calculated Centimeters)            170.2


      Current Weight (lbs)                       72.575 kg


      Weight (Calculated Kilograms)              72.6


      Weight (Calculated Grams)                  77668.8


      Ideal Body Weight                          61.4 kg, 135 lb


      % Ideal Body Weight                        119


      Body Mass Index (BMI)                      25.0


      Weight Status                              Overweight


     GI Symptoms


      GI Symptoms                                None


      Last BM                                     (soft, x 2)


      Difficult in:                              Chewing


      Food Allergies                             No


      Cultural/Ethnic/Catholic Belief           unable to assess


      Usual diet at home                         unable to assess


      Skin Integrity/Comment:                    no pressure injury/non-healing


                                                 wounds


      Current %PO                                Good (%)


     Estimated Nutritional Goals


      BEE in Kcals:                              Using Current wt


      Calories/Kcals/Kg                          25-30


      Kcals Calculated                           7513-4425


      Protein:                                   Using Current wt


      Protein g/k-90


      Protein Calculated                         1-1.25


      Fluid: ml                                  8426-1115 (1 ml/kcal)


     Nutritional Problem


      1. Problem


       Problem                                   No nutrition diagnosis at this


                                                 time.


     Intervention/Recommendation


      Comments                                   suggest continue current diet


                                                 rx, pureed, per MD.


     Expected Outcomes/Goals


      Expected Outcomes/Goals                    Maintain PO intakes % of


                                                 meals.


                                                 Electronically Signed by:


                                                 Shira Alonso, MPH, RDN


                                                 Clinical Dietitian 19 7:


                                                 54 PM

## 2019-06-26 RX ADMIN — ALBUTEROL SULFATE SCH: 2 SYRUP ORAL at 17:21

## 2019-06-26 RX ADMIN — LACTULOSE SCH: 20 SOLUTION ORAL at 10:24

## 2019-06-26 RX ADMIN — HALOPERIDOL LACTATE PRN MG: 2 SOLUTION, CONCENTRATE ORAL at 05:59

## 2019-06-26 RX ADMIN — ALBUTEROL SULFATE SCH: 2 SYRUP ORAL at 10:23

## 2019-06-26 RX ADMIN — DIGOXIN SCH: 0.05 SOLUTION ORAL at 10:23

## 2019-06-26 RX ADMIN — LACTULOSE SCH: 20 SOLUTION ORAL at 17:22

## 2019-06-26 NOTE — PROGRESS NOTES
DATE:  06/25/2019



Case was discussed with staff of the patient, reviewed records.  The patient

continues to need redirection, staff is feeding her.  Continues to be easily

agitative, continues to have poor insight, unable to make safe plan for

self-care or participate in meaningful conversation.  I will continue to work

with the patient in group therapy, milieu therapy, and adjust the medication as

needed.





DD: 06/25/2019 12:26

DT: 06/26/2019 00:01

[facility]# [id number]  [id number]

## 2019-06-26 NOTE — INTERNAL MEDICINE PROG NOTE
Internal Medicine Subjective





- Subjective


Patient seen and examined:: with staff, chart reviewed


Patient is:: awake, verbal, non-interactive, agitated, confused


Per staff patient has:: no adverse event, no episodes of fall, poor appetite, 

agitated, combative





Internal Medicine Objective





- Results


Recent Labs: 


 Laboratory Last Values











Triglycerides  86 mg/dL (<150)   19  06:45    


 


Cholesterol  158 mg/dL (<200)   19  06:45    


 


LDL Cholesterol Direct  100 mg/dL ()   19  06:45    


 


HDL Cholesterol  38 mg/dL (23-92)   19  06:45    


 


Valproic Acid  35.7 ug/mL (50.0-100.0)  L  19  07:30    














- Physical Exam


Vitals and I&O: 


 Vital Signs











Temp  97.1 F   19 06:30


 


Pulse  65   19 10:23


 


Resp  18   19 06:30


 


BP  123/76   19 06:30


 


Pulse Ox  96   19 06:30








 Intake & Output











 19





 18:59 06:59 18:59


 


Intake Total  60 


 


Balance  60 


 


Intake:   


 


  Oral  60 


 


Other:   


 


  # Voids 2 2 


 


  # Bowel Movements 2 2 


 


  Stool Characteristics Soft  











Active Medications: 


Current Medications





Acetaminophen (Tylenol 650mg Supp)  650 mg RC Q4H PRN


   PRN Reason: Mild Pain/Headache/T above 101


   Stop: 19 20:14


Al Hydrox/Mg Hydrox/Simethicone (Maalox)  30 ml PO Q6H PRN


   PRN Reason: Dyspepsia


   Stop: 19 20:14


Albuterol Sulfate (Ventolin)  2 mg PO BID [facility]


   Stop: 08/15/19 08:59


   Last Admin: 19 10:23 Dose:  Not Given


Ascorbic Acid (Vitamin C)  500 mg PO DAILY [facility]


   Stop: 08/15/19 08:59


   Last Admin: 19 10:23 Dose:  Not Given


Betamethasone Dipropionate (Betamethasone Dip 0.05% Cream)  1 appl TP DAILY [facility]


   Stop: 08/15/19 08:59


   Last Admin: 19 10:23 Dose:  Not Given


Clonazepam (Klonopin)  0.5 mg PO BID [facility]; Protocol


   Stop: 19 08:59


   Last Admin: 19 10:23 Dose:  Not Given


Clopidogrel Bisulfate (Plavix)  75 mg PO DAILY [facility]


   Stop: 08/15/19 08:59


   Last Admin: 19 10:23 Dose:  Not Given


Digoxin (Lanoxin)  0.125 mg PO DAILY [facility]


   Stop: 08/15/19 08:59


   Last Admin: 19 10:23 Dose:  Not Given


Divalproex Sodium (Depakote Er)  500 mg PO Q12HR [facility]; Protocol


   Stop: 08/15/19 08:59


   Last Admin: 19 10:23 Dose:  Not Given


Donepezil HCl (Aricept)  10 mg PO HS [facility]


   Stop: 19 20:59


Haloperidol Lactate (Haldol)  1 mg PO BID PRN; Protocol


   PRN Reason: Agitation


   Stop: 08/15/19 08:59


   Last Admin: 19 05:59 Dose:  1 mg


Lactulose (Cephulac)  30 gm PO BID [facility]


   Stop: 08/15/19 08:59


   Last Admin: 19 10:24 Dose:  Not Given


Lorazepam (Ativan)  0.5 mg PO Q4HR PRN; Protocol


   PRN Reason: Anxiety/Agitation


   Stop: 07/15/19 20:14


   Last Admin: 19 23:30 Dose:  0.5 mg


Magnesium Hydroxide (Milk Of Magnesia)  30 ml PO HS PRN


   PRN Reason: Constipation


   Stop: 19 20:14


Midodrine (Proamatine)  5 mg PO Q12H [facility]


   Stop: 08/15/19 08:59


   Last Admin: 19 10:23 Dose:  Not Given


Multivitamins/Vitamin C (Theragran)  1 tab PO DAILY [facility]


   Stop: 08/15/19 08:59


   Last Admin: 19 10:24 Dose:  Not Given


Promethazine HCl/Dextromethorphan (Phenergan Dm 6.25/15mg-5 Ml)  5 ml PO TID PRN


   PRN Reason: Cough/ CONGESTION


   Stop: 08/15/19 01:44


Quetiapine Fumarate (Seroquel)  50 mg PO HS [facility]; Protocol


   Stop: 08/15/19 20:59


   Last Admin: 19 20:30 Dose:  50 mg


Quetiapine Fumarate (Seroquel)  25 mg PO BID [facility]; Protocol


   Stop: 19 08:59


   Last Admin: 19 10:22 Dose:  25 mg


Quetiapine Fumarate (Seroquel)  50 mg PO HS [facility]; Protocol


   Stop: 19 20:59


Zinc Sulfate (Zinc Sulfate)  220 mg PO DAILY [facility]


   Stop: 08/15/19 08:59


   Last Admin: 19 10:24 Dose:  Not Given


Zolpidem Tartrate (Ambien)  5 mg PO HS PRN


   PRN Reason: Insomnia


   Stop: 19 20:14


   Last Admin: 19 00:20 Dose:  5 mg








General: demented


HEENT: NC/AT, PERRLA, EOMI


Neck: Supple, No JVD


Lungs: rales


Cardiovascular: RRR, Normal S1, Normal S2


Abdomen: soft, non-tender, non-distended, positive bowel sound


Extremities: excoriation


Neurological: no change





- Procedures


Procedures: 


 Procedures











Procedure Code Date


 


CARDIOPULM RESUSCITA NOS 99.60 10/19/07


 


CL REDUC DISLOC-HAND/FNG 79.74 07


 


CLOSURE SKIN & SUBCUTANEOUS NEC 86.59 09/10/12


 


CONTINUOUS INVASIVE MECHANICAL VENTILATION <96 CONSEC HRS 96.71 10/19/07


 


DESTRUCT EXT EAR LES NEC 18.29 01


 


DIPHTHERIA TOXOID ADMIN 99.36 12


 


EGD BIOPSY SINGLE/MULTIPLE 90202 06/16/10


 


EGD CONTROL BLEEDING ANY 01257 06


 


ELECTROCARDIOGRAM 89.52 01


 


ELECTROCARDIOGRAM COMPLETE 59438 01


 


EMERGENCY DEPT VISIT 72585 12


 


EMERGENCY DEPT VISIT 14127 12


 


ENDOSCOP EXC OR DEST OF LESION OR TISSUE OF ESOPHA 42.33 06


 


ESOPHAGOGASTRODUODENOSCOPY [EGD] W/CLOSED BIOPSY 45.16 06/16/10


 


EXC FACE-MM B9+ALBERTO 2.1-3 CM 90177 01


 


FREEING OF BOWEL ADHESION 80285 08


 


HEART/LUNG RESUSCITATION CPR 09653 10/19/07


 


HERNIA REPAIR W/MESH 58987 12


 


IMMUNIZATION ADMIN 58579 12


 


INJECT/INFUSE NEC 99.29 13


 


INSERT EMERGENCY AIRWAY 18381 10/19/07


 


INSERT ENDOTRACHEAL TUBE 96.04 10/19/07


 


INSERTION OF INFUSION DEV INTO R SUBCLAV VEIN, PERC APPROACH [id number] 16


 


INSJ PICC 5 YR+ W/O IMAGING 67133 16


 


OP RED-INT FIX TIB/FIBUL 79.36 98


 


OTH LYSIS-PERITONEAL ADHES 54.59 12


 


OTHER C.A.T. SCAN 88.38 98


 


OTHER OPEN INCISIONAL HERNIA REPAIR WITH GRAFT OR PROSTHESIS 53.61 12


 


PART SM BOWEL RESECT NEC 45.62 08


 


PERMANENT ILEOSTOMY NEC 46.23 10/19/07


 


RADIOGRAPHIC PROCEDURE 35692 98


 


REMOVAL OF COLON 80733 10/19/07


 


REMOVAL OF SPLEEN TOTAL 42024 10/19/07


 


REPAIR BOWEL OPENING 08404 08


 


RPR S/N/AX/GEN/TRNK 2.5CM/< 05669 12


 


RPR S/N/AX/GEN/TRNK2.6-7.5CM 61118 09/10/12


 


RPR VENTRAL ALLISON INIT REDUC 99540 12


 


SM BOWEL STOMA CLOSURE 46.51 08


 


TD VACCINE > 7 IM 79179 12


 


TETANUS TOXOID ADMINIST 99.38 12


 


THER/PROPH/DIAG INJ IV PUSH 70585 13


 


TOT INTRA-ABD COLECTOMY 45.8 10/19/07


 


TOTAL SPLENECTOMY 41.5 10/19/07


 


TREAT FINGER DISLOCATION 57151 07


 


TREATMENT OF ANKLE FRACTURE 41720 98


 


ULTRASONOGRAPHY OF RIGHT SUBCLAVIAN VEIN, GUIDANCE [id number] 16


 


VENOUS CATHETERIZATION NEC 38.93 06














Internal Medicine Assmt/Plan





- Assessment


Assessment: 





ASSESSMENT AND PLAN: agitation fecal


impaction, low albumin, schizoaffective disorder, leukocytosis, dementia,


hypothyroidism, asthma, degenerative joint disease.  


elevated ammonia pyuria











- Plan


Plan: 








- Plan


Plan: 





PLAN:   We will continue the


patient on oxygen and bronchodilator treatments.    We will review the patient's


chest x-ray and will review the patient's urine culture.  We will refer the


patient to Psychiatry as well, adjust the patient's psychotropic medication.  We


will continue monitoring the patient closely.  


add lactulose


on short course levaquin





Nutritional Asmnt/Malnutr-PDOC





- Dietary Evaluation


Malnutrition Findings (Please click <Entered> for more info): 








Nutritional Asmnt/Malnutrition                             Start:  19 19:

41


Text:                                                      Status: Active      

  


Freq:                                                                          

  


Protocol:                                                                      

  


 Document     19 19:41  FNS.D01  (Rec: 19 19:53  FNS.D01  VEENA-FNS1)


 Nutritional Asmnt/Malnutrition


     Patient General Information


      Nutritional Screening                      Moderate Risk


      Diagnosis                                  psychosis


      Subjective Information                     pt non-verbal, spoke with RN


                                                 who reports patient with good


                                                 appetite, is eating 100% of


                                                 meals, loves strawberry ice


                                                 cream and chicken.


      Current Diet Order/ Nutrition Support      Pureed


      Patient / S.O                              Not Indicated


      Pertinent Medications                      vitamin C, MOM, zinc sulfate,


                                                 theragran


      Pertinent Labs                             Labs reviewed.


     Nutritional Hx/Data


      Height                                     1.7 m


      Height (Calculated Centimeters)            170.2


      Current Weight (lbs)                       72.575 kg


      Weight (Calculated Kilograms)              72.6


      Weight (Calculated Grams)                  81260.8


      Ideal Body Weight                          61.4 kg, 135 lb


      % Ideal Body Weight                        119


      Body Mass Index (BMI)                      25.0


      Weight Status                              Overweight


     GI Symptoms


      GI Symptoms                                None


      Last BM                                     (soft, x 2)


      Difficult in:                              Chewing


      Food Allergies                             No


      Cultural/Ethnic/Shinto Belief           unable to assess


      Usual diet at home                         unable to assess


      Skin Integrity/Comment:                    no pressure injury/non-healing


                                                 wounds


      Current %PO                                Good (%)


     Estimated Nutritional Goals


      BEE in Kcals:                              Using Current wt


      Calories/Kcals/Kg                          25-30


      Kcals Calculated                           9427-1003


      Protein:                                   Using Current wt


      Protein g/k-90


      Protein Calculated                         1-1.25


      Fluid: ml                                  2668-5069 (1 ml/kcal)


     Nutritional Problem


      1. Problem


       Problem                                   No nutrition diagnosis at this


                                                 time.


     Intervention/Recommendation


      Comments                                   suggest continue current diet


                                                 rx, pureed, per MD.


     Expected Outcomes/Goals


      Expected Outcomes/Goals                    Maintain PO intakes % of


                                                 meals.


                                                 Electronically Signed by:


                                                 Shira Alonso, MPH, RDN


                                                 Clinical Dietitian 19 7:


                                                 54 PM

## 2019-06-27 RX ADMIN — LACTULOSE SCH GM: 20 SOLUTION ORAL at 08:36

## 2019-06-27 RX ADMIN — ALBUTEROL SULFATE SCH MG: 2 SYRUP ORAL at 08:35

## 2019-06-27 RX ADMIN — DIGOXIN SCH MG: 0.05 SOLUTION ORAL at 08:36

## 2019-06-27 RX ADMIN — ALBUTEROL SULFATE SCH MG: 2 SYRUP ORAL at 16:22

## 2019-06-27 RX ADMIN — HALOPERIDOL LACTATE PRN MG: 2 SOLUTION, CONCENTRATE ORAL at 14:51

## 2019-06-27 RX ADMIN — LACTULOSE SCH GM: 20 SOLUTION ORAL at 16:22

## 2019-06-27 NOTE — INTERNAL MEDICINE PROG NOTE
Internal Medicine Subjective





- Subjective


Patient seen and examined:: with staff, chart reviewed


Patient is:: awake, verbal, non-interactive, agitated, confused


Per staff patient has:: no adverse event, no episodes of fall, poor appetite, 

agitated, combative





Internal Medicine Objective





- Results


Recent Labs: 


 Laboratory Last Values











Triglycerides  86 mg/dL (<150)   19  06:45    


 


Cholesterol  158 mg/dL (<200)   19  06:45    


 


LDL Cholesterol Direct  100 mg/dL ()   19  06:45    


 


HDL Cholesterol  38 mg/dL (23-92)   19  06:45    


 


Valproic Acid  35.7 ug/mL (50.0-100.0)  L  19  07:30    














- Physical Exam


Vitals and I&O: 


 Vital Signs











Temp  97.8 F   19 06:48


 


Pulse  67   19 08:36


 


Resp  18   19 06:48


 


BP  110/58   19 06:48


 


Pulse Ox  93   19 06:48








 Intake & Output











 19





 18:59 06:59 18:59


 


Intake Total  1320 


 


Balance  1320 


 


Intake:   


 


  Oral  1320 


 


Other:   


 


  # Voids  2 


 


  # Bowel Movements  0 











Active Medications: 


Current Medications





Acetaminophen (Tylenol 650mg Supp)  650 mg RC Q4H PRN


   PRN Reason: Mild Pain/Headache/T above 101


   Stop: 19 20:14


Al Hydrox/Mg Hydrox/Simethicone (Maalox)  30 ml PO Q6H PRN


   PRN Reason: Dyspepsia


   Stop: 19 20:14


Albuterol Sulfate (Ventolin)  2 mg PO BID [facility]


   Stop: 08/15/19 08:59


   Last Admin: 19 08:35 Dose:  2 mg


Ascorbic Acid (Vitamin C)  500 mg PO DAILY [facility]


   Stop: 08/15/19 08:59


   Last Admin: 19 08:36 Dose:  500 mg


Betamethasone Dipropionate (Betamethasone Dip 0.05% Cream)  1 appl TP DAILY [facility]


   Stop: 08/15/19 08:59


   Last Admin: 19 08:36 Dose:  1 appl


Clonazepam (Klonopin)  0.5 mg PO HS [facility]; Protocol


   Stop: 19 20:59


Clopidogrel Bisulfate (Plavix)  75 mg PO DAILY [facility]


   Stop: 08/15/19 08:59


   Last Admin: 19 08:36 Dose:  75 mg


Digoxin (Lanoxin)  0.125 mg PO DAILY [facility]


   Stop: 08/15/19 08:59


   Last Admin: 19 08:36 Dose:  0.125 mg


Divalproex Sodium (Depakote Er)  500 mg PO Q12HR [facility]; Protocol


   Stop: 08/15/19 08:59


   Last Admin: 19 08:35 Dose:  500 mg


Donepezil HCl (Aricept)  10 mg PO HS [facility]


   Stop: 19 20:59


   Last Admin: 19 20:32 Dose:  10 mg


Haloperidol Lactate (Haldol)  1 mg PO BID [facility]; Protocol


   PRN Reason: Agitation


   Stop: 08/15/19 08:59


   Last Admin: 19 05:59 Dose:  1 mg


Lactulose (Cephulac)  30 gm PO BID [facility]


   Stop: 08/15/19 08:59


   Last Admin: 19 08:36 Dose:  30 gm


Lorazepam (Ativan)  0.5 mg PO Q4HR PRN; Protocol


   PRN Reason: Anxiety/Agitation


   Stop: 07/15/19 20:14


   Last Admin: 19 23:30 Dose:  0.5 mg


Magnesium Hydroxide (Milk Of Magnesia)  30 ml PO HS PRN


   PRN Reason: Constipation


   Stop: 19 20:14


Midodrine (Proamatine)  5 mg PO Q12H [facility]


   Stop: 08/15/19 08:59


   Last Admin: 19 08:36 Dose:  5 mg


Multivitamins/Vitamin C (Theragran)  1 tab PO DAILY [facility]


   Stop: 08/15/19 08:59


   Last Admin: 19 08:36 Dose:  1 tab


Promethazine HCl/Dextromethorphan (Phenergan Dm 6.25/15mg-5 Ml)  5 ml PO TID PRN


   PRN Reason: Cough/ CONGESTION


   Stop: 08/15/19 01:44


Quetiapine Fumarate (Seroquel)  50 mg PO HS [facility]; Protocol


   Stop: 08/15/19 20:59


   Last Admin: 19 20:32 Dose:  50 mg


Quetiapine Fumarate (Seroquel)  25 mg PO BID [facility]; Protocol


   Stop: 19 08:59


   Last Admin: 19 08:36 Dose:  25 mg


Zinc Sulfate (Zinc Sulfate)  220 mg PO DAILY [facility]


   Stop: 08/15/19 08:59


   Last Admin: 19 08:36 Dose:  220 mg


Zolpidem Tartrate (Ambien)  5 mg PO HS PRN


   PRN Reason: Insomnia


   Stop: 19 20:14


   Last Admin: 19 00:20 Dose:  5 mg








General: demented


HEENT: NC/AT, PERRLA, EOMI


Neck: Supple, No JVD


Lungs: rales


Cardiovascular: RRR, Normal S1, Normal S2


Abdomen: soft, non-tender, non-distended, positive bowel sound


Extremities: excoriation


Neurological: no change





- Procedures


Procedures: 


 Procedures











Procedure Code Date


 


CARDIOPULM RESUSCITA NOS 99.60 10/19/07


 


CL REDUC DISLOC-HAND/FNG 79.74 07


 


CLOSURE SKIN & SUBCUTANEOUS NEC 86.59 09/10/12


 


CONTINUOUS INVASIVE MECHANICAL VENTILATION <96 CONSEC HRS 96.71 10/19/07


 


DESTRUCT EXT EAR LES NEC 18.29 01


 


DIPHTHERIA TOXOID ADMIN 99.36 12


 


EGD BIOPSY SINGLE/MULTIPLE 85996 06/16/10


 


EGD CONTROL BLEEDING ANY 16313 06


 


ELECTROCARDIOGRAM 89.52 01


 


ELECTROCARDIOGRAM COMPLETE 81636 01


 


EMERGENCY DEPT VISIT 48501 12


 


EMERGENCY DEPT VISIT 34268 12


 


ENDOSCOP EXC OR DEST OF LESION OR TISSUE OF ESOPHA 42.33 06


 


ESOPHAGOGASTRODUODENOSCOPY [EGD] W/CLOSED BIOPSY 45.16 06/16/10


 


EXC FACE-MM B9+ALBERTO 2.1-3 CM 70830 01


 


FREEING OF BOWEL ADHESION 84343 08


 


HEART/LUNG RESUSCITATION CPR 98497 10/19/07


 


HERNIA REPAIR W/MESH 63375 12


 


IMMUNIZATION ADMIN 45321 12


 


INJECT/INFUSE NEC 99.29 13


 


INSERT EMERGENCY AIRWAY 55417 10/19/07


 


INSERT ENDOTRACHEAL TUBE 96.04 10/19/07


 


INSERTION OF INFUSION DEV INTO R SUBCLAV VEIN, PERC APPROACH [id number] 16


 


INSJ PICC 5 YR+ W/O IMAGING 15560 16


 


OP RED-INT FIX TIB/FIBUL 79.36 98


 


OTH LYSIS-PERITONEAL ADHES 54.59 12


 


OTHER C.A.T. SCAN 88.38 98


 


OTHER OPEN INCISIONAL HERNIA REPAIR WITH GRAFT OR PROSTHESIS 53.61 12


 


PART SM BOWEL RESECT NEC 45.62 08


 


PERMANENT ILEOSTOMY NEC 46.23 10/19/07


 


RADIOGRAPHIC PROCEDURE 57389 98


 


REMOVAL OF COLON 52448 10/19/07


 


REMOVAL OF SPLEEN TOTAL 07170 10/19/07


 


REPAIR BOWEL OPENING 96326 08


 


RPR S/N/AX/GEN/TRNK 2.5CM/< 84912 12


 


RPR S/N/AX/GEN/TRNK2.6-7.5CM 88043 09/10/12


 


RPR VENTRAL ALLISON INIT REDUC 16358 12


 


SM BOWEL STOMA CLOSURE 46.51 08


 


TD VACCINE > 7 IM 11287 12


 


TETANUS TOXOID ADMINIST 99.38 12


 


THER/PROPH/DIAG INJ IV PUSH 33099 13


 


TOT INTRA-ABD COLECTOMY 45.8 10/19/07


 


TOTAL SPLENECTOMY 41.5 10/19/07


 


TREAT FINGER DISLOCATION 26718 07


 


TREATMENT OF ANKLE FRACTURE 36313 98


 


ULTRASONOGRAPHY OF RIGHT SUBCLAVIAN VEIN, GUIDANCE [id number] 16


 


VENOUS CATHETERIZATION NEC 38.93 06














Internal Medicine Assmt/Plan





- Assessment


Assessment: 





ASSESSMENT AND PLAN: agitation fecal


impaction, low albumin, schizoaffective disorder, leukocytosis, dementia,


hypothyroidism, asthma, degenerative joint disease.  


elevated ammonia pyuria











- Plan


Plan: 








- Plan


Plan: 





PLAN:   We will continue the


patient on oxygen and bronchodilator treatments.    We will review the patient's


chest x-ray and will review the patient's urine culture.  We will refer the


patient to Psychiatry as well, adjust the patient's psychotropic medication.  We


will continue monitoring the patient closely.  


add lactulose


on short course levaquin





Nutritional Asmnt/Malnutr-PDOC





- Dietary Evaluation


Malnutrition Findings (Please click <Entered> for more info): 








Nutritional Asmnt/Malnutrition                             Start:  19 19:

41


Text:                                                      Status: Active      

  


Freq:                                                                          

  


Protocol:                                                                      

  


 Document     19 19:41  FNS.D01  (Rec: 19 19:53  FNS.D01  VEENA-FNS1)


 Nutritional Asmnt/Malnutrition


     Patient General Information


      Nutritional Screening                      Moderate Risk


      Diagnosis                                  psychosis


      Subjective Information                     pt non-verbal, spoke with RN


                                                 who reports patient with good


                                                 appetite, is eating 100% of


                                                 meals, loves strawberry ice


                                                 cream and chicken.


      Current Diet Order/ Nutrition Support      Pureed


      Patient / S.O                              Not Indicated


      Pertinent Medications                      vitamin C, MOM, zinc sulfate,


                                                 theragran


      Pertinent Labs                             Labs reviewed.


     Nutritional Hx/Data


      Height                                     1.7 m


      Height (Calculated Centimeters)            170.2


      Current Weight (lbs)                       72.575 kg


      Weight (Calculated Kilograms)              72.6


      Weight (Calculated Grams)                  12716.8


      Ideal Body Weight                          61.4 kg, 135 lb


      % Ideal Body Weight                        119


      Body Mass Index (BMI)                      25.0


      Weight Status                              Overweight


     GI Symptoms


      GI Symptoms                                None


      Last BM                                     (soft, x 2)


      Difficult in:                              Chewing


      Food Allergies                             No


      Cultural/Ethnic/Religion Belief           unable to assess


      Usual diet at home                         unable to assess


      Skin Integrity/Comment:                    no pressure injury/non-healing


                                                 wounds


      Current %PO                                Good (%)


     Estimated Nutritional Goals


      BEE in Kcals:                              Using Current wt


      Calories/Kcals/Kg                          25-30


      Kcals Calculated                           6713-2859


      Protein:                                   Using Current wt


      Protein g/k-90


      Protein Calculated                         1-1.25


      Fluid: ml                                  7988-6662 (1 ml/kcal)


     Nutritional Problem


      1. Problem


       Problem                                   No nutrition diagnosis at this


                                                 time.


     Intervention/Recommendation


      Comments                                   suggest continue current diet


                                                 rx, pureed, per MD.


     Expected Outcomes/Goals


      Expected Outcomes/Goals                    Maintain PO intakes % of


                                                 meals.


                                                 Electronically Signed by:


                                                 Shira Alonso, MPH, RDN


                                                 Clinical Dietitian 19 7:


                                                 54 PM

## 2019-06-27 NOTE — PROGRESS NOTES
DATE:  06/27/2019



DATE OF SERVICE:  06/27/2019



SUBJECTIVE:  Chart reviewed and the patient interviewed.  Also discussed the

patient's condition with the staff and reviewed records and labs.  The patient

is still confused and only oriented to her name.  The patient seems to be

slightly calmer than before, but she is still having episodes of screaming and

yelling for no reason.  She is also still selectively mute.  The patient also

refused to take her medications yesterday morning, but she did take her

medications at night.  The patient also still needs lots of redirections. 

Otherwise, the patient is cooperative most of the time and seems to be slightly

less irritable and less agitated.



ASSESSMENT:  The patient is still confused, not needs redirections.



TREATMENT PLAN:  We will continue to monitor her behavior and her condition

closely.  Also, since the patient showing some improvement, we will decrease

Klonopin to 0.5 mg only.  Also, continue to work on behavioral modification and

adjusting psychotropic medications and also working on discharge plans.





DD: 06/27/2019 07:07

DT: 06/27/2019 08:33

JOB# [id number]  [id number]

## 2019-06-28 RX ADMIN — LACTULOSE SCH GM: 20 SOLUTION ORAL at 08:57

## 2019-06-28 RX ADMIN — ALBUTEROL SULFATE SCH: 2 SYRUP ORAL at 17:37

## 2019-06-28 RX ADMIN — ALBUTEROL SULFATE SCH MG: 2 SYRUP ORAL at 09:02

## 2019-06-28 RX ADMIN — DIGOXIN SCH MG: 0.05 SOLUTION ORAL at 09:02

## 2019-06-28 NOTE — PROGRESS NOTES
DATE:  



SUBJECTIVE:  Chart was reviewed and the patient interviewed.  Also discussed the

patient's condition with the staff and reviewed records and labs.  The patient

is still rambling and she is still restless and in irritable mood.  The patient

also still has episodes of yelling and screaming and she is easily irritable and

easily agitated.  The patient is also still having severe mood swings. 

Otherwise, the patient is more compliant with taking her medications with no

side effects of medications.



Vital signs are stable and no reports of medical issues.  Depakote blood level

that was done on the 19th is 35.7.  We will repeat Depakote blood level on

Monday and we will continue to follow up.





DD: 06/28/2019 06:44

DT: 06/28/2019 19:16

[facility]# [id number]  [id number]

## 2019-06-28 NOTE — INTERNAL MEDICINE PROG NOTE
Internal Medicine Subjective





- Subjective


Patient seen and examined:: with staff, chart reviewed


Patient is:: awake, verbal, non-interactive, agitated, confused


Per staff patient has:: no adverse event, no episodes of fall, poor appetite, 

agitated, combative





Internal Medicine Objective





- Results


Recent Labs: 


 Laboratory Last Values











Triglycerides  86 mg/dL (<150)   19  06:45    


 


Cholesterol  158 mg/dL (<200)   19  06:45    


 


LDL Cholesterol Direct  100 mg/dL ()   19  06:45    


 


HDL Cholesterol  38 mg/dL (23-92)   19  06:45    


 


Valproic Acid  44.5 ug/mL (50.0-100.0)  L  19  08:50    














- Physical Exam


Vitals and I&O: 


 Vital Signs











Temp  97.7 F   19 13:49


 


Pulse  78   19 13:49


 


Resp  16   19 13:49


 


BP  102/59   19 13:49


 


Pulse Ox  93   19 13:49








 Intake & Output











 19





 18:59 06:59 18:59


 


Intake Total 1400 240 


 


Balance 1400 240 


 


Intake:   


 


  Oral 1400 240 


 


Other:   


 


  # Voids 3 2 


 


  # Bowel Movements 0 1 











Active Medications: 


Current Medications





Acetaminophen (Tylenol 650mg Supp)  650 mg RC Q4H PRN


   PRN Reason: Mild Pain/Headache/T above 101


   Stop: 19 20:14


Al Hydrox/Mg Hydrox/Simethicone (Maalox)  30 ml PO Q6H PRN


   PRN Reason: Dyspepsia


   Stop: 19 20:14


Albuterol Sulfate (Ventolin)  2 mg PO BID [facility]


   Stop: 08/15/19 08:59


   Last Admin: 19 09:02 Dose:  2 mg


Ascorbic Acid (Vitamin C)  500 mg PO DAILY [facility]


   Stop: 08/15/19 08:59


   Last Admin: 19 08:56 Dose:  500 mg


Betamethasone Dipropionate (Betamethasone Dip 0.05% Cream)  1 appl TP DAILY [facility]


   Stop: 08/15/19 08:59


   Last Admin: 19 09:02 Dose:  1 appl


Clonazepam (Klonopin)  0.5 mg PO HS [facility]; Protocol


   Stop: 19 20:59


   Last Admin: 19 20:44 Dose:  0.5 mg


Clopidogrel Bisulfate (Plavix)  75 mg PO DAILY [facility]


   Stop: 08/15/19 08:59


   Last Admin: 19 08:56 Dose:  75 mg


Digoxin (Lanoxin)  0.125 mg PO DAILY [facility]


   Stop: 08/15/19 08:59


   Last Admin: 19 09:02 Dose:  0.125 mg


Divalproex Sodium (Depakote Er)  500 mg PO Q12HR [facility]; Protocol


   Stop: 08/15/19 08:59


   Last Admin: 19 08:55 Dose:  500 mg


Donepezil HCl (Aricept)  10 mg PO HS [facility]


   Stop: 19 20:59


   Last Admin: 19 20:44 Dose:  10 mg


Haloperidol Lactate (Haldol)  1 mg PO BID [facility]; Protocol


   PRN Reason: Agitation


   Stop: 08/15/19 08:59


   Last Admin: 19 14:51 Dose:  1 mg


Lactulose (Cephulac)  30 gm PO BID [facility]


   Stop: 08/15/19 08:59


   Last Admin: 19 08:57 Dose:  30 gm


Lorazepam (Ativan)  0.5 mg PO Q4HR PRN; Protocol


   PRN Reason: Anxiety/Agitation


   Stop: 07/15/19 20:14


   Last Admin: 19 21:35 Dose:  0.5 mg


Magnesium Hydroxide (Milk Of Magnesia)  30 ml PO HS PRN


   PRN Reason: Constipation


   Stop: 19 20:14


Midodrine (Proamatine)  5 mg PO Q12H [facility]


   Stop: 08/15/19 08:59


   Last Admin: 19 09:01 Dose:  5 mg


Multivitamins/Vitamin C (Theragran)  1 tab PO DAILY [facility]


   Stop: 08/15/19 08:59


   Last Admin: 19 08:56 Dose:  1 tab


Promethazine HCl/Dextromethorphan (Phenergan Dm 6.25/15mg-5 Ml)  5 ml PO TID PRN


   PRN Reason: Cough/ CONGESTION


   Stop: 08/15/19 01:44


Quetiapine Fumarate (Seroquel)  50 mg PO HS [facility]; Protocol


   Stop: 08/15/19 20:59


   Last Admin: 19 20:44 Dose:  50 mg


Quetiapine Fumarate (Seroquel)  25 mg PO BID [facility]; Protocol


   Stop: 19 08:59


   Last Admin: 19 08:55 Dose:  25 mg


Zinc Sulfate (Zinc Sulfate)  220 mg PO DAILY [facility]


   Stop: 08/15/19 08:59


   Last Admin: 19 08:55 Dose:  220 mg


Zolpidem Tartrate (Ambien)  5 mg PO HS PRN


   PRN Reason: Insomnia


   Stop: 19 20:14


   Last Admin: 19 20:44 Dose:  5 mg








General: demented


HEENT: NC/AT, PERRLA, EOMI


Neck: Supple, No JVD


Lungs: rales


Cardiovascular: RRR, Normal S1, Normal S2


Abdomen: soft, non-tender, non-distended, positive bowel sound


Extremities: excoriation


Neurological: no change





- Procedures


Procedures: 


 Procedures











Procedure Code Date


 


CARDIOPULM RESUSCITA NOS 99.60 10/19/07


 


CL REDUC DISLOC-HAND/FNG 79.74 07


 


CLOSURE SKIN & SUBCUTANEOUS NEC 86.59 09/10/12


 


CONTINUOUS INVASIVE MECHANICAL VENTILATION <96 CONSEC HRS 96.71 10/19/07


 


DESTRUCT EXT EAR LES NEC 18.29 01


 


DIPHTHERIA TOXOID ADMIN 99.36 12


 


EGD BIOPSY SINGLE/MULTIPLE 44801 06/16/10


 


EGD CONTROL BLEEDING ANY 61348 06


 


ELECTROCARDIOGRAM 89.52 01


 


ELECTROCARDIOGRAM COMPLETE 04476 01


 


EMERGENCY DEPT VISIT 03273 12


 


EMERGENCY DEPT VISIT 40577 12


 


ENDOSCOP EXC OR DEST OF LESION OR TISSUE OF ESOPHA 42.33 06


 


ESOPHAGOGASTRODUODENOSCOPY [EGD] W/CLOSED BIOPSY 45.16 06/16/10


 


EXC FACE-MM B9+ALBERTO 2.1-3 CM 91653 01


 


FREEING OF BOWEL ADHESION 23121 08


 


HEART/LUNG RESUSCITATION CPR 75398 10/19/07


 


HERNIA REPAIR W/MESH 56624 12


 


IMMUNIZATION ADMIN 75792 12


 


INJECT/INFUSE NEC 99.29 13


 


INSERT EMERGENCY AIRWAY 08453 10/19/07


 


INSERT ENDOTRACHEAL TUBE 96.04 10/19/07


 


INSERTION OF INFUSION DEV INTO R SUBCLAV VEIN, PERC APPROACH [id number] 16


 


INSJ PICC 5 YR+ W/O IMAGING 55446 16


 


OP RED-INT FIX TIB/FIBUL 79.36 98


 


OTH LYSIS-PERITONEAL ADHES 54.59 12


 


OTHER C.A.T. SCAN 88.38 98


 


OTHER OPEN INCISIONAL HERNIA REPAIR WITH GRAFT OR PROSTHESIS 53.61 12


 


PART SM BOWEL RESECT NEC 45.62 08


 


PERMANENT ILEOSTOMY NEC 46.23 10/19/07


 


RADIOGRAPHIC PROCEDURE 25245 98


 


REMOVAL OF COLON 39477 10/19/07


 


REMOVAL OF SPLEEN TOTAL 60059 10/19/07


 


REPAIR BOWEL OPENING 64150 08


 


RPR S/N/AX/GEN/TRNK 2.5CM/< 62872 12


 


RPR S/N/AX/GEN/TRNK2.6-7.5CM 97361 09/10/12


 


RPR VENTRAL ALLISON INIT REDUC 61348 12


 


SM BOWEL STOMA CLOSURE 46.51 08


 


TD VACCINE > 7 IM 99194 12


 


TETANUS TOXOID ADMINIST 99.38 12


 


THER/PROPH/DIAG INJ IV PUSH 77293 13


 


TOT INTRA-ABD COLECTOMY 45.8 10/19/07


 


TOTAL SPLENECTOMY 41.5 10/19/07


 


TREAT FINGER DISLOCATION 22818 07


 


TREATMENT OF ANKLE FRACTURE 21027 98


 


ULTRASONOGRAPHY OF RIGHT SUBCLAVIAN VEIN, GUIDANCE [id number] 16


 


VENOUS CATHETERIZATION NEC 38.93 06














Internal Medicine Assmt/Plan





- Assessment


Assessment: 





ASSESSMENT AND PLAN: agitation fecal


impaction, low albumin, schizoaffective disorder, leukocytosis, dementia,


hypothyroidism, asthma, degenerative joint disease.  


elevated ammonia pyuria











- Plan


Plan: 








- Plan


Plan: 





PLAN:   We will continue the


patient on oxygen and bronchodilator treatments.    We will review the patient's


chest x-ray and will review the patient's urine culture.  We will refer the


patient to Psychiatry as well, adjust the patient's psychotropic medication.  We


will continue monitoring the patient closely.  


add lactulose


on short course levaquin





Nutritional Asmnt/Malnutr-PDOC





- Dietary Evaluation


Malnutrition Findings (Please click <Entered> for more info): 








Nutritional Asmnt/Malnutrition                             Start:  19 19:

41


Text:                                                      Status: Active      

  


Freq:                                                                          

  


Protocol:                                                                      

  


 Document     19 19:41  FNS.D01  (Rec: 19 19:53  FNS.D01  VEENA-FNS1)


 Nutritional Asmnt/Malnutrition


     Patient General Information


      Nutritional Screening                      Moderate Risk


      Diagnosis                                  psychosis


      Subjective Information                     pt non-verbal, spoke with RN


                                                 who reports patient with good


                                                 appetite, is eating 100% of


                                                 meals, loves strawberry ice


                                                 cream and chicken.


      Current Diet Order/ Nutrition Support      Pureed


      Patient / S.O                              Not Indicated


      Pertinent Medications                      vitamin C, MOM, zinc sulfate,


                                                 theragran


      Pertinent Labs                             Labs reviewed.


     Nutritional Hx/Data


      Height                                     1.7 m


      Height (Calculated Centimeters)            170.2


      Current Weight (lbs)                       72.575 kg


      Weight (Calculated Kilograms)              72.6


      Weight (Calculated Grams)                  30108.8


      Ideal Body Weight                          61.4 kg, 135 lb


      % Ideal Body Weight                        119


      Body Mass Index (BMI)                      25.0


      Weight Status                              Overweight


     GI Symptoms


      GI Symptoms                                None


      Last BM                                     (soft, x 2)


      Difficult in:                              Chewing


      Food Allergies                             No


      Cultural/Ethnic/Adventist Belief           unable to assess


      Usual diet at home                         unable to assess


      Skin Integrity/Comment:                    no pressure injury/non-healing


                                                 wounds


      Current %PO                                Good (%)


     Estimated Nutritional Goals


      BEE in Kcals:                              Using Current wt


      Calories/Kcals/Kg                          25-30


      Kcals Calculated                           5889-9056


      Protein:                                   Using Current wt


      Protein g/k-90


      Protein Calculated                         1-1.25


      Fluid: ml                                  4913-7966 (1 ml/kcal)


     Nutritional Problem


      1. Problem


       Problem                                   No nutrition diagnosis at this


                                                 time.


     Intervention/Recommendation


      Comments                                   suggest continue current diet


                                                 rx, pureed, per MD.


     Expected Outcomes/Goals


      Expected Outcomes/Goals                    Maintain PO intakes % of


                                                 meals.


                                                 Electronically Signed by:


                                                 Shira Alonso, MPH, RDN


                                                 Clinical Dietitian 19 7:


                                                 54 PM

## 2019-06-28 NOTE — PROGRESS NOTES
DATE:  06/26/2019



SUBJECTIVE:  Chart was reviewed and the patient interviewed.  Also discussed the

patient's condition with the staff and reviewed records and labs.  The patient

is still having episodes of yelling and screaming for no reason.  The patient

also is still restless and she is still confused.  The patient also did not

sleep most of last night according to staff.  Angry mood.  Needs lots of

redirections.  The patient is compliant with taking her medications.



ASSESSMENT:  The patient is still agitated and psychotic.



TREATMENT PLAN:  Continue to monitor her behavior and her condition closely. 

Also, we will increase Aricept to 10 mg at bedtime and we will increase Seroquel

to 25 mg twice a day and continue to follow up closely.





DD: 06/27/2019 07:33

DT: 06/27/2019 19:57

[facility]# [id number]  [id number]

## 2019-06-29 RX ADMIN — ALBUTEROL SULFATE SCH MG: 2 SYRUP ORAL at 17:44

## 2019-06-29 RX ADMIN — ALBUTEROL SULFATE SCH MG: 2 SYRUP ORAL at 09:27

## 2019-06-29 RX ADMIN — LACTULOSE SCH: 20 SOLUTION ORAL at 09:25

## 2019-06-29 RX ADMIN — DIGOXIN SCH MG: 0.05 SOLUTION ORAL at 09:27

## 2019-06-29 RX ADMIN — LACTULOSE SCH: 20 SOLUTION ORAL at 17:44

## 2019-06-29 NOTE — INTERNAL MEDICINE PROG NOTE
Internal Medicine Subjective





- Subjective


Patient seen and examined:: with staff, chart reviewed


Patient is:: awake, verbal, non-interactive, agitated, confused


Per staff patient has:: no adverse event, no episodes of fall, poor appetite, 

agitated, combative





Internal Medicine Objective





- Results


Recent Labs: 


 Laboratory Last Values











Triglycerides  86 mg/dL (<150)   19  06:45    


 


Cholesterol  158 mg/dL (<200)   19  06:45    


 


LDL Cholesterol Direct  100 mg/dL ()   19  06:45    


 


HDL Cholesterol  38 mg/dL (23-92)   19  06:45    


 


Valproic Acid  44.5 ug/mL (50.0-100.0)  L  19  08:50    














- Physical Exam


Vitals and I&O: 


 Vital Signs











Temp  97.2 F   19 06:10


 


Pulse  62   19 06:10


 


Resp  19   19 06:10


 


BP  109/56   19 06:10


 


Pulse Ox  95   19 06:10








 Intake & Output











 19





 18:59 06:59 18:59


 


Intake Total 850 0 


 


Balance 850 0 


 


Intake:   


 


  Oral 850 0 


 


Other:   


 


  # Voids 4 2 


 


  # Bowel Movements 1  











Active Medications: 


Current Medications





Acetaminophen (Tylenol 650mg Supp)  650 mg RC Q4H PRN


   PRN Reason: Mild Pain/Headache/T above 101


   Stop: 19 20:14


Al Hydrox/Mg Hydrox/Simethicone (Maalox)  30 ml PO Q6H PRN


   PRN Reason: Dyspepsia


   Stop: 19 20:14


Albuterol Sulfate (Ventolin)  2 mg PO BID [facility]


   Stop: 08/15/19 08:59


   Last Admin: 19 09:27 Dose:  2 mg


Ascorbic Acid (Vitamin C)  500 mg PO DAILY [facility]


   Stop: 08/15/19 08:59


   Last Admin: 19 09:24 Dose:  500 mg


Betamethasone Dipropionate (Betamethasone Dip 0.05% Cream)  1 appl TP DAILY [facility]


   Stop: 08/15/19 08:59


   Last Admin: 19 09:25 Dose:  1 appl


Clonazepam (Klonopin)  0.5 mg PO HS [facility]; Protocol


   Stop: 19 20:59


   Last Admin: 19 21:50 Dose:  0.5 mg


Clopidogrel Bisulfate (Plavix)  75 mg PO DAILY [facility]


   Stop: 08/15/19 08:59


   Last Admin: 19 09:24 Dose:  75 mg


Digoxin (Lanoxin)  0.125 mg PO DAILY [facility]


   Stop: 08/15/19 08:59


   Last Admin: 19 09:27 Dose:  0.125 mg


Divalproex Sodium (Depakote Er)  500 mg PO Q12HR [facility]; Protocol


   Stop: 08/15/19 08:59


   Last Admin: 19 09:24 Dose:  500 mg


Donepezil HCl (Aricept)  10 mg PO HS [facility]


   Stop: 19 20:59


   Last Admin: 19 21:50 Dose:  10 mg


Haloperidol Lactate (Haldol)  1 mg PO BID PRN; Protocol


   PRN Reason: Agitation


   Stop: 08/15/19 08:59


   Last Admin: 19 14:51 Dose:  1 mg


Lactulose (Cephulac)  30 gm PO BID [facility]


   Stop: 08/15/19 08:59


   Last Admin: 19 09:25 Dose:  Not Given


Lorazepam (Ativan)  0.5 mg PO Q4HR PRN; Protocol


   PRN Reason: Anxiety/Agitation


   Stop: 07/15/19 20:14


   Last Admin: 19 00:49 Dose:  0.5 mg


Magnesium Hydroxide (Milk Of Magnesia)  30 ml PO HS PRN


   PRN Reason: Constipation


   Stop: 19 20:14


Midodrine (Proamatine)  5 mg PO Q12H [facility]


   Stop: 08/15/19 08:59


   Last Admin: 19 09:27 Dose:  5 mg


Multivitamins/Vitamin C (Theragran)  1 tab PO DAILY [facility]


   Stop: 08/15/19 08:59


   Last Admin: 19 09:25 Dose:  1 tab


Promethazine HCl/Dextromethorphan (Phenergan Dm 6.25/15mg-5 Ml)  5 ml PO TID PRN


   PRN Reason: Cough/ CONGESTION


   Stop: 08/15/19 01:44


Quetiapine Fumarate (Seroquel)  50 mg PO HS [facility]; Protocol


   Stop: 08/15/19 20:59


   Last Admin: 19 21:50 Dose:  50 mg


Quetiapine Fumarate (Seroquel)  25 mg PO BID [facility]; Protocol


   Stop: 19 08:59


   Last Admin: 19 09:25 Dose:  25 mg


Zinc Sulfate (Zinc Sulfate)  220 mg PO DAILY [facility]


   Stop: 08/15/19 08:59


   Last Admin: 19 09:25 Dose:  220 mg


Zolpidem Tartrate (Ambien)  5 mg PO HS PRN


   PRN Reason: Insomnia


   Stop: 19 20:14


   Last Admin: 19 20:44 Dose:  5 mg








General: demented


HEENT: NC/AT, PERRLA, EOMI


Neck: Supple, No JVD


Lungs: rales


Cardiovascular: RRR, Normal S1, Normal S2


Abdomen: soft, non-tender, non-distended, positive bowel sound


Extremities: excoriation


Neurological: no change





- Procedures


Procedures: 


 Procedures











Procedure Code Date


 


CARDIOPULM RESUSCITA NOS 99.60 10/19/07


 


CL REDUC DISLOC-HAND/FNG 79.74 07


 


CLOSURE SKIN & SUBCUTANEOUS NEC 86.59 09/10/12


 


CONTINUOUS INVASIVE MECHANICAL VENTILATION <96 CONSEC HRS 96.71 10/19/07


 


DESTRUCT EXT EAR LES NEC 18.29 01


 


DIPHTHERIA TOXOID ADMIN 99.36 12


 


EGD BIOPSY SINGLE/MULTIPLE 79901 06/16/10


 


EGD CONTROL BLEEDING ANY 32724 06


 


ELECTROCARDIOGRAM 89.52 01


 


ELECTROCARDIOGRAM COMPLETE 95096 01


 


EMERGENCY DEPT VISIT 42128 12


 


EMERGENCY DEPT VISIT 78094 12


 


ENDOSCOP EXC OR DEST OF LESION OR TISSUE OF ESOPHA 42.33 06


 


ESOPHAGOGASTRODUODENOSCOPY [EGD] W/CLOSED BIOPSY 45.16 06/16/10


 


EXC FACE-MM B9+ALBERTO 2.1-3 CM 30323 01


 


FREEING OF BOWEL ADHESION 10160 08


 


HEART/LUNG RESUSCITATION CPR 53320 10/19/07


 


HERNIA REPAIR W/MESH 59574 12


 


IMMUNIZATION ADMIN 46745 12


 


INJECT/INFUSE NEC 99.29 13


 


INSERT EMERGENCY AIRWAY 39382 10/19/07


 


INSERT ENDOTRACHEAL TUBE 96.04 10/19/07


 


INSERTION OF INFUSION DEV INTO R SUBCLAV VEIN, PERC APPROACH [id number] 16


 


INSJ PICC 5 YR+ W/O IMAGING 67505 16


 


OP RED-INT FIX TIB/FIBUL 79.36 98


 


OTH LYSIS-PERITONEAL ADHES 54.59 12


 


OTHER C.A.T. SCAN 88.38 98


 


OTHER OPEN INCISIONAL HERNIA REPAIR WITH GRAFT OR PROSTHESIS 53.61 12


 


PART SM BOWEL RESECT NEC 45.62 08


 


PERMANENT ILEOSTOMY NEC 46.23 10/19/07


 


RADIOGRAPHIC PROCEDURE 16520 98


 


REMOVAL OF COLON 88941 10/19/07


 


REMOVAL OF SPLEEN TOTAL 13338 10/19/07


 


REPAIR BOWEL OPENING 93304 08


 


RPR S/N/AX/GEN/TRNK 2.5CM/< 73187 12


 


RPR S/N/AX/GEN/TRNK2.6-7.5CM 67404 09/10/12


 


RPR VENTRAL ALLISON INIT REDUC 22411 12


 


SM BOWEL STOMA CLOSURE 46.51 08


 


TD VACCINE > 7 IM 35775 12


 


TETANUS TOXOID ADMINIST 99.38 12


 


THER/PROPH/DIAG INJ IV PUSH 75563 13


 


TOT INTRA-ABD COLECTOMY 45.8 10/19/07


 


TOTAL SPLENECTOMY 41.5 10/19/07


 


TREAT FINGER DISLOCATION 35510 07


 


TREATMENT OF ANKLE FRACTURE 82178 98


 


ULTRASONOGRAPHY OF RIGHT SUBCLAVIAN VEIN, GUIDANCE [id number] 16


 


VENOUS CATHETERIZATION NEC 38.93 06














Internal Medicine Assmt/Plan





- Assessment


Assessment: 





ASSESSMENT AND PLAN: agitation fecal


impaction, low albumin, schizoaffective disorder, leukocytosis, dementia,


hypothyroidism, asthma, degenerative joint disease.  


elevated ammonia pyuria











- Plan


Plan: 








- Plan


Plan: 





PLAN:   We will continue the


patient on oxygen and bronchodilator treatments.    We will review the patient's


chest x-ray and will review the patient's urine culture.  We will refer the


patient to Psychiatry as well, adjust the patient's psychotropic medication.  We


will continue monitoring the patient closely.  


add lactulose


on short course levaquin





Nutritional Asmnt/Malnutr-PDOC





- Dietary Evaluation


Malnutrition Findings (Please click <Entered> for more info): 








Nutritional Asmnt/Malnutrition                             Start:  19 19:

41


Text:                                                      Status: Active      

  


Freq:                                                                          

  


Protocol:                                                                      

  


 Document     19 19:41  FNS.D01  (Rec: 19 19:53  FNS.D01  VEENA-FNS1)


 Nutritional Asmnt/Malnutrition


     Patient General Information


      Nutritional Screening                      Moderate Risk


      Diagnosis                                  psychosis


      Subjective Information                     pt non-verbal, spoke with RN


                                                 who reports patient with good


                                                 appetite, is eating 100% of


                                                 meals, loves strawberry ice


                                                 cream and chicken.


      Current Diet Order/ Nutrition Support      Pureed


      Patient / S.O                              Not Indicated


      Pertinent Medications                      vitamin C, MOM, zinc sulfate,


                                                 theragran


      Pertinent Labs                             Labs reviewed.


     Nutritional Hx/Data


      Height                                     1.7 m


      Height (Calculated Centimeters)            170.2


      Current Weight (lbs)                       72.575 kg


      Weight (Calculated Kilograms)              72.6


      Weight (Calculated Grams)                  35790.8


      Ideal Body Weight                          61.4 kg, 135 lb


      % Ideal Body Weight                        119


      Body Mass Index (BMI)                      25.0


      Weight Status                              Overweight


     GI Symptoms


      GI Symptoms                                None


      Last BM                                     (soft, x 2)


      Difficult in:                              Chewing


      Food Allergies                             No


      Cultural/Ethnic/Confucianist Belief           unable to assess


      Usual diet at home                         unable to assess


      Skin Integrity/Comment:                    no pressure injury/non-healing


                                                 wounds


      Current %PO                                Good (%)


     Estimated Nutritional Goals


      BEE in Kcals:                              Using Current wt


      Calories/Kcals/Kg                          25-30


      Kcals Calculated                           2791-5503


      Protein:                                   Using Current wt


      Protein g/k-90


      Protein Calculated                         1-1.25


      Fluid: ml                                  9780-4956 (1 ml/kcal)


     Nutritional Problem


      1. Problem


       Problem                                   No nutrition diagnosis at this


                                                 time.


     Intervention/Recommendation


      Comments                                   suggest continue current diet


                                                 rx, pureed, per MD.


     Expected Outcomes/Goals


      Expected Outcomes/Goals                    Maintain PO intakes % of


                                                 meals.


                                                 Electronically Signed by:


                                                 Shira Alonso, MPH, RDN


                                                 Clinical Dietitian 19 7:


                                                 54 PM

## 2019-06-30 RX ADMIN — ALBUTEROL SULFATE SCH MG: 2 SYRUP ORAL at 17:30

## 2019-06-30 RX ADMIN — ALBUTEROL SULFATE SCH MG: 2 SYRUP ORAL at 09:06

## 2019-06-30 RX ADMIN — DIGOXIN SCH MG: 0.05 SOLUTION ORAL at 09:07

## 2019-06-30 RX ADMIN — LACTULOSE SCH: 20 SOLUTION ORAL at 17:29

## 2019-06-30 RX ADMIN — LACTULOSE SCH: 20 SOLUTION ORAL at 09:07

## 2019-06-30 NOTE — INTERNAL MEDICINE PROG NOTE
Internal Medicine Subjective





- Subjective


Patient seen and examined:: with staff, chart reviewed


Patient is:: awake, verbal, non-interactive, agitated, confused


Per staff patient has:: no adverse event, no episodes of fall, poor appetite, 

agitated, combative





Internal Medicine Objective





- Results


Recent Labs: 


 Laboratory Last Values











Triglycerides  86 mg/dL (<150)   19  06:45    


 


Cholesterol  158 mg/dL (<200)   19  06:45    


 


LDL Cholesterol Direct  100 mg/dL ()   19  06:45    


 


HDL Cholesterol  38 mg/dL (23-92)   19  06:45    


 


Valproic Acid  44.5 ug/mL (50.0-100.0)  L  19  08:50    














- Physical Exam


Vitals and I&O: 


 Vital Signs











Temp  97.3 F   19 05:52


 


Pulse  72   19 05:52


 


Resp  20   19 05:52


 


BP  140/76   19 05:52


 


Pulse Ox  97   19 05:52








 Intake & Output











 19





 18:59 06:59 18:59


 


Intake Total 700 240 


 


Balance 700 240 


 


Intake:   


 


  Oral 700 240 


 


Other:   


 


  # Voids 3 2 


 


  # Bowel Movements 0  











Active Medications: 


Current Medications





Acetaminophen (Tylenol 650mg Supp)  650 mg RC Q4H PRN


   PRN Reason: Mild Pain/Headache/T above 101


   Stop: 19 20:14


Al Hydrox/Mg Hydrox/Simethicone (Maalox)  30 ml PO Q6H PRN


   PRN Reason: Dyspepsia


   Stop: 19 20:14


Albuterol Sulfate (Ventolin)  2 mg PO BID [facility]


   Stop: 08/15/19 08:59


   Last Admin: 19 09:06 Dose:  2 mg


Ascorbic Acid (Vitamin C)  500 mg PO DAILY [facility]


   Stop: 08/15/19 08:59


   Last Admin: 19 09:06 Dose:  500 mg


Betamethasone Dipropionate (Betamethasone Dip 0.05% Cream)  1 appl TP DAILY [facility]


   Stop: 08/15/19 08:59


   Last Admin: 19 09:07 Dose:  1 appl


Clonazepam (Klonopin)  0.5 mg PO HS [facility]; Protocol


   Stop: 19 20:59


   Last Admin: 19 21:53 Dose:  0.5 mg


Clopidogrel Bisulfate (Plavix)  75 mg PO DAILY [facility]


   Stop: 08/15/19 08:59


   Last Admin: 19 09:05 Dose:  75 mg


Digoxin (Lanoxin)  0.125 mg PO DAILY [facility]


   Stop: 08/15/19 08:59


   Last Admin: 19 09:07 Dose:  0.125 mg


Divalproex Sodium (Depakote Er)  500 mg PO Q12HR [facility]; Protocol


   Stop: 08/15/19 08:59


   Last Admin: 19 09:06 Dose:  500 mg


Donepezil HCl (Aricept)  10 mg PO HS [facility]


   Stop: 19 20:59


   Last Admin: 19 21:53 Dose:  10 mg


Haloperidol Lactate (Haldol)  1 mg PO BID PRN; Protocol


   PRN Reason: Agitation


   Stop: 08/15/19 08:59


   Last Admin: 19 14:51 Dose:  1 mg


Lactulose (Cephulac)  30 gm PO BID [facility]


   Stop: 08/15/19 08:59


   Last Admin: 19 09:07 Dose:  Not Given


Lorazepam (Ativan)  0.5 mg PO Q4HR PRN; Protocol


   PRN Reason: Anxiety/Agitation


   Stop: 07/15/19 20:14


   Last Admin: 19 00:49 Dose:  0.5 mg


Magnesium Hydroxide (Milk Of Magnesia)  30 ml PO HS PRN


   PRN Reason: Constipation


   Stop: 19 20:14


Midodrine (Proamatine)  5 mg PO Q12H [facility]


   Stop: 08/15/19 08:59


   Last Admin: 19 09:04 Dose:  5 mg


Multivitamins/Vitamin C (Theragran)  1 tab PO DAILY [facility]


   Stop: 08/15/19 08:59


   Last Admin: 19 09:05 Dose:  1 tab


Promethazine HCl/Dextromethorphan (Phenergan Dm 6.25/15mg-5 Ml)  5 ml PO TID PRN


   PRN Reason: Cough/ CONGESTION


   Stop: 08/15/19 01:44


Quetiapine Fumarate (Seroquel)  50 mg PO HS [facility]; Protocol


   Stop: 08/15/19 20:59


   Last Admin: 19 21:53 Dose:  50 mg


Quetiapine Fumarate (Seroquel)  25 mg PO BID [facility]; Protocol


   Stop: 19 08:59


   Last Admin: 19 09:05 Dose:  25 mg


Zinc Sulfate (Zinc Sulfate)  220 mg PO DAILY [facility]


   Stop: 08/15/19 08:59


   Last Admin: 19 09:06 Dose:  220 mg


Zolpidem Tartrate (Ambien)  5 mg PO HS PRN


   PRN Reason: Insomnia


   Stop: 19 20:14


   Last Admin: 19 21:54 Dose:  5 mg








General: demented


HEENT: NC/AT, PERRLA, EOMI


Neck: Supple, No JVD


Lungs: rales


Cardiovascular: RRR, Normal S1, Normal S2


Abdomen: soft, non-tender, non-distended, positive bowel sound


Extremities: excoriation


Neurological: no change





- Procedures


Procedures: 


 Procedures











Procedure Code Date


 


CARDIOPULM RESUSCITA NOS 99.60 10/19/07


 


CL REDUC DISLOC-HAND/FNG 79.74 07


 


CLOSURE SKIN & SUBCUTANEOUS NEC 86.59 09/10/12


 


CONTINUOUS INVASIVE MECHANICAL VENTILATION <96 CONSEC HRS 96.71 10/19/07


 


DESTRUCT EXT EAR LES NEC 18.29 01


 


DIPHTHERIA TOXOID ADMIN 99.36 12


 


EGD BIOPSY SINGLE/MULTIPLE 49275 06/16/10


 


EGD CONTROL BLEEDING ANY 30721 06


 


ELECTROCARDIOGRAM 89.52 01


 


ELECTROCARDIOGRAM COMPLETE 99285 01


 


EMERGENCY DEPT VISIT 88157 12


 


EMERGENCY DEPT VISIT 46824 12


 


ENDOSCOP EXC OR DEST OF LESION OR TISSUE OF ESOPHA 42.33 06


 


ESOPHAGOGASTRODUODENOSCOPY [EGD] W/CLOSED BIOPSY 45.16 06/16/10


 


EXC FACE-MM B9+ALBERTO 2.1-3 CM 00135 01


 


FREEING OF BOWEL ADHESION 03331 08


 


HEART/LUNG RESUSCITATION CPR 38374 10/19/07


 


HERNIA REPAIR W/MESH 03833 12


 


IMMUNIZATION ADMIN 62594 12


 


INJECT/INFUSE NEC 99.29 13


 


INSERT EMERGENCY AIRWAY 29999 10/19/07


 


INSERT ENDOTRACHEAL TUBE 96.04 10/19/07


 


INSERTION OF INFUSION DEV INTO R SUBCLAV VEIN, PERC APPROACH [id number] 16


 


INSJ PICC 5 YR+ W/O IMAGING 53146 16


 


OP RED-INT FIX TIB/FIBUL 79.36 98


 


OTH LYSIS-PERITONEAL ADHES 54.59 12


 


OTHER C.A.T. SCAN 88.38 98


 


OTHER OPEN INCISIONAL HERNIA REPAIR WITH GRAFT OR PROSTHESIS 53.61 12


 


PART SM BOWEL RESECT NEC 45.62 08


 


PERMANENT ILEOSTOMY NEC 46.23 10/19/07


 


RADIOGRAPHIC PROCEDURE 89854 98


 


REMOVAL OF COLON 88442 10/19/07


 


REMOVAL OF SPLEEN TOTAL 97866 10/19/07


 


REPAIR BOWEL OPENING 92287 08


 


RPR S/N/AX/GEN/TRNK 2.5CM/< 34513 12


 


RPR S/N/AX/GEN/TRNK2.6-7.5CM 08902 09/10/12


 


RPR VENTRAL ALLISON INIT REDUC 24569 12


 


SM BOWEL STOMA CLOSURE 46.51 08


 


TD VACCINE > 7 IM 15701 12


 


TETANUS TOXOID ADMINIST 99.38 12


 


THER/PROPH/DIAG INJ IV PUSH 53427 13


 


TOT INTRA-ABD COLECTOMY 45.8 10/19/07


 


TOTAL SPLENECTOMY 41.5 10/19/07


 


TREAT FINGER DISLOCATION 76067 07


 


TREATMENT OF ANKLE FRACTURE 58460 98


 


ULTRASONOGRAPHY OF RIGHT SUBCLAVIAN VEIN, GUIDANCE [id number] 16


 


VENOUS CATHETERIZATION NEC 38.93 06














Internal Medicine Assmt/Plan





- Assessment


Assessment: 





ASSESSMENT AND PLAN: agitation fecal


impaction, low albumin, schizoaffective disorder, leukocytosis, dementia,


hypothyroidism, asthma, degenerative joint disease.  


elevated ammonia pyuria











- Plan


Plan: 








- Plan


Plan: 





PLAN:   We will continue the


patient on oxygen and bronchodilator treatments.    We will review the patient's


chest x-ray and will review the patient's urine culture.  We will refer the


patient to Psychiatry as well, adjust the patient's psychotropic medication.  We


will continue monitoring the patient closely.  


add lactulose


on short course levaquin





Nutritional Asmnt/Malnutr-PDOC





- Dietary Evaluation


Malnutrition Findings (Please click <Entered> for more info): 








Nutritional Asmnt/Malnutrition                             Start:  19 19:

41


Text:                                                      Status: Active      

  


Freq:                                                                          

  


Protocol:                                                                      

  


 Document     19 19:41  FNS.D01  (Rec: 19 19:53  FNS.D01  VEENA-FNS1)


 Nutritional Asmnt/Malnutrition


     Patient General Information


      Nutritional Screening                      Moderate Risk


      Diagnosis                                  psychosis


      Subjective Information                     pt non-verbal, spoke with RN


                                                 who reports patient with good


                                                 appetite, is eating 100% of


                                                 meals, loves strawberry ice


                                                 cream and chicken.


      Current Diet Order/ Nutrition Support      Pureed


      Patient / S.O                              Not Indicated


      Pertinent Medications                      vitamin C, MOM, zinc sulfate,


                                                 theragran


      Pertinent Labs                             Labs reviewed.


     Nutritional Hx/Data


      Height                                     1.7 m


      Height (Calculated Centimeters)            170.2


      Current Weight (lbs)                       72.575 kg


      Weight (Calculated Kilograms)              72.6


      Weight (Calculated Grams)                  48605.8


      Ideal Body Weight                          61.4 kg, 135 lb


      % Ideal Body Weight                        119


      Body Mass Index (BMI)                      25.0


      Weight Status                              Overweight


     GI Symptoms


      GI Symptoms                                None


      Last BM                                     (soft, x 2)


      Difficult in:                              Chewing


      Food Allergies                             No


      Cultural/Ethnic/B[employer]ist Belief           unable to assess


      Usual diet at home                         unable to assess


      Skin Integrity/Comment:                    no pressure injury/non-healing


                                                 wounds


      Current %PO                                Good (%)


     Estimated Nutritional Goals


      BEE in Kcals:                              Using Current wt


      Calories/Kcals/Kg                          25-30


      Kcals Calculated                           6764-0201


      Protein:                                   Using Current wt


      Protein g/k-90


      Protein Calculated                         1-1.25


      Fluid: ml                                  6732-6682 (1 ml/kcal)


     Nutritional Problem


      1. Problem


       Problem                                   No nutrition diagnosis at this


                                                 time.


     Intervention/Recommendation


      Comments                                   suggest continue current diet


                                                 rx, pureed, per MD.


     Expected Outcomes/Goals


      Expected Outcomes/Goals                    Maintain PO intakes % of


                                                 meals.


                                                 Electronically Signed by:


                                                 Shira Alonso, MPH, RDN


                                                 Clinical Dietitian 19 7:


                                                 54 PM

## 2019-06-30 NOTE — PROGRESS NOTES
DATE:  06/29/2019



SUBJECTIVE:  Chart was reviewed and the patient interviewed.  Also, discussed

the patient's condition with the staff and reviewed records and labs.  The

patient is still confused, agitated, actively hallucinating.  The patient is

talking to herself.  The patient also still has disorganized thoughts and unable

to carry on coherent conversation because of confusion and because of

disorganized thoughts.



ASSESSMENT:  The patient is still confused.



TREATMENT PLAN:  We will continue monitoring her behavior and her condition

closely.  Also, continue to work on behavior modification and her irritability

as well as discharge plans.





DD: 06/29/2019 17:56

DT: 06/29/2019 23:58

JOB# [id number]  [id number]

## 2019-07-01 RX ADMIN — ALBUTEROL SULFATE SCH MG: 2 SYRUP ORAL at 08:48

## 2019-07-01 RX ADMIN — LACTULOSE SCH: 20 SOLUTION ORAL at 08:42

## 2019-07-01 RX ADMIN — LACTULOSE SCH: 20 SOLUTION ORAL at 16:23

## 2019-07-01 RX ADMIN — ALBUTEROL SULFATE SCH MG: 2 SYRUP ORAL at 16:25

## 2019-07-01 RX ADMIN — HALOPERIDOL LACTATE PRN MG: 2 SOLUTION, CONCENTRATE ORAL at 08:43

## 2019-07-01 RX ADMIN — HALOPERIDOL LACTATE PRN MG: 2 SOLUTION, CONCENTRATE ORAL at 16:26

## 2019-07-01 RX ADMIN — DIGOXIN SCH MG: 0.05 SOLUTION ORAL at 08:52

## 2019-07-01 NOTE — INTERNAL MEDICINE PROG NOTE
Internal Medicine Subjective





- Subjective


Patient seen and examined:: with staff, chart reviewed


Patient is:: awake, verbal, non-interactive, agitated, confused


Per staff patient has:: no adverse event, no episodes of fall, poor appetite, 

agitated, combative





Internal Medicine Objective





- Results


Recent Labs: 


 Laboratory Last Values











Triglycerides  86 mg/dL (<150)   19  06:45    


 


Cholesterol  158 mg/dL (<200)   19  06:45    


 


LDL Cholesterol Direct  100 mg/dL ()   19  06:45    


 


HDL Cholesterol  38 mg/dL (23-92)   19  06:45    


 


Valproic Acid  68.5 ug/mL (50.0-100.0)   19  06:50    














- Physical Exam


Vitals and I&O: 


 Vital Signs











Temp  97.0 F   19 06:00


 


Pulse  68   19 08:52


 


Resp  18   19 08:00


 


BP  99/52   19 06:00


 


Pulse Ox  95   19 06:00








 Intake & Output











 19





 18:59 06:59 18:59


 


Intake Total  240 


 


Balance  240 


 


Intake:   


 


  Oral  240 


 


Other:   


 


  # Voids  2 


 


  # Bowel Movements  1 











Active Medications: 


Current Medications





Acetaminophen (Tylenol 650mg Supp)  650 mg RC Q4H PRN


   PRN Reason: Mild Pain/Headache/T above 101


   Stop: 19 20:14


Al Hydrox/Mg Hydrox/Simethicone (Maalox)  30 ml PO Q6H PRN


   PRN Reason: Dyspepsia


   Stop: 19 20:14


Albuterol Sulfate (Ventolin)  2 mg PO BID [facility]


   Stop: 08/15/19 08:59


   Last Admin: 19 08:48 Dose:  2 mg


Ascorbic Acid (Vitamin C)  500 mg PO DAILY [facility]


   Stop: 08/15/19 08:59


   Last Admin: 19 08:43 Dose:  500 mg


Betamethasone Dipropionate (Betamethasone Dip 0.05% Cream)  1 appl TP DAILY [facility]


   Stop: 08/15/19 08:59


   Last Admin: 19 08:50 Dose:  1 appl


Clonazepam (Klonopin)  0.5 mg PO HS [facility]; Protocol


   Stop: 19 20:59


   Last Admin: 19 20:58 Dose:  0.5 mg


Clopidogrel Bisulfate (Plavix)  75 mg PO DAILY [facility]


   Stop: 08/15/19 08:59


   Last Admin: 19 08:43 Dose:  75 mg


Digoxin (Lanoxin)  0.125 mg PO DAILY [facility]


   Stop: 08/15/19 08:59


   Last Admin: 19 08:52 Dose:  0.125 mg


Divalproex Sodium (Depakote Er)  500 mg PO Q12HR [facility]; Protocol


   Stop: 08/15/19 08:59


   Last Admin: 19 08:42 Dose:  500 mg


Donepezil HCl (Aricept)  10 mg PO HS [facility]


   Stop: 19 20:59


   Last Admin: 19 20:59 Dose:  10 mg


Haloperidol Lactate (Haldol)  1 mg PO BID [facility]; Protocol


   PRN Reason: Agitation


   Stop: 08/15/19 08:59


   Last Admin: 19 08:43 Dose:  1 mg


Lactulose (Cephulac)  30 gm PO BID [facility]


   Stop: 08/15/19 08:59


   Last Admin: 19 08:42 Dose:  Not Given


Lorazepam (Ativan)  0.5 mg PO Q4HR PRN; Protocol


   PRN Reason: Anxiety/Agitation


   Stop: 07/15/19 20:14


   Last Admin: 19 00:49 Dose:  0.5 mg


Magnesium Hydroxide (Milk Of Magnesia)  30 ml PO HS PRN


   PRN Reason: Constipation


   Stop: 19 20:14


Midodrine (Proamatine)  5 mg PO Q12H [facility]


   Stop: 08/15/19 08:59


   Last Admin: 19 08:54 Dose:  5 mg


Multivitamins/Vitamin C (Theragran)  1 tab PO DAILY [facility]


   Stop: 08/15/19 08:59


   Last Admin: 19 08:43 Dose:  1 tab


Promethazine HCl/Dextromethorphan (Phenergan Dm 6.25/15mg-5 Ml)  5 ml PO TID PRN


   PRN Reason: Cough/ CONGESTION


   Stop: 08/15/19 01:44


Quetiapine Fumarate (Seroquel)  50 mg PO HS [facility]; Protocol


   Stop: 08/15/19 20:59


   Last Admin: 19 21:00 Dose:  50 mg


Quetiapine Fumarate (Seroquel)  25 mg PO BID [facility]; Protocol


   Stop: 19 08:59


   Last Admin: 19 08:43 Dose:  25 mg


Zinc Sulfate (Zinc Sulfate)  220 mg PO DAILY [facility]


   Stop: 08/15/19 08:59


   Last Admin: 19 08:43 Dose:  220 mg


Zolpidem Tartrate (Ambien)  5 mg PO HS PRN


   PRN Reason: Insomnia


   Stop: 19 20:14


   Last Admin: 19 21:00 Dose:  5 mg








General: demented


HEENT: NC/AT, PERRLA, EOMI


Neck: Supple, No JVD


Lungs: rales


Cardiovascular: RRR, Normal S1, Normal S2


Abdomen: soft, non-tender, non-distended, positive bowel sound


Extremities: excoriation


Neurological: no change





- Procedures


Procedures: 


 Procedures











Procedure Code Date


 


CARDIOPULM RESUSCITA NOS 99.60 10/19/07


 


CL REDUC DISLOC-HAND/FNG 79.74 07


 


CLOSURE SKIN & SUBCUTANEOUS NEC 86.59 09/10/12


 


CONTINUOUS INVASIVE MECHANICAL VENTILATION <96 CONSEC HRS 96.71 10/19/07


 


DESTRUCT EXT EAR LES NEC 18.29 01


 


DIPHTHERIA TOXOID ADMIN 99.36 12


 


EGD BIOPSY SINGLE/MULTIPLE 95198 06/16/10


 


EGD CONTROL BLEEDING ANY 59790 06


 


ELECTROCARDIOGRAM 89.52 01


 


ELECTROCARDIOGRAM COMPLETE 19092 01


 


EMERGENCY DEPT VISIT 49474 12


 


EMERGENCY DEPT VISIT 65544 12


 


ENDOSCOP EXC OR DEST OF LESION OR TISSUE OF ESOPHA 42.33 06


 


ESOPHAGOGASTRODUODENOSCOPY [EGD] W/CLOSED BIOPSY 45.16 06/16/10


 


EXC FACE-MM B9+ALBERTO 2.1-3 CM 71202 01


 


FREEING OF BOWEL ADHESION 27337 08


 


HEART/LUNG RESUSCITATION CPR 39756 10/19/07


 


HERNIA REPAIR W/MESH 94680 12


 


IMMUNIZATION ADMIN 68547 12


 


INJECT/INFUSE NEC 99.29 13


 


INSERT EMERGENCY AIRWAY 87546 10/19/07


 


INSERT ENDOTRACHEAL TUBE 96.04 10/19/07


 


INSERTION OF INFUSION DEV INTO R SUBCLAV VEIN, PERC APPROACH [id number] 16


 


INSJ PICC 5 YR+ W/O IMAGING 53921 16


 


OP RED-INT FIX TIB/FIBUL 79.36 98


 


OTH LYSIS-PERITONEAL ADHES 54.59 12


 


OTHER C.A.T. SCAN 88.38 98


 


OTHER OPEN INCISIONAL HERNIA REPAIR WITH GRAFT OR PROSTHESIS 53.61 12


 


PART SM BOWEL RESECT NEC 45.62 08


 


PERMANENT ILEOSTOMY NEC 46.23 10/19/07


 


RADIOGRAPHIC PROCEDURE 91413 98


 


REMOVAL OF COLON 89873 10/19/07


 


REMOVAL OF SPLEEN TOTAL 55019 10/19/07


 


REPAIR BOWEL OPENING 01846 08


 


RPR S/N/AX/GEN/TRNK 2.5CM/< 20998 12


 


RPR S/N/AX/GEN/TRNK2.6-7.5CM 26563 09/10/12


 


RPR VENTRAL ALLISON INIT REDUC 20135 12


 


SM BOWEL STOMA CLOSURE 46.51 08


 


TD VACCINE > 7 IM 47623 12


 


TETANUS TOXOID ADMINIST 99.38 12


 


THER/PROPH/DIAG INJ IV PUSH 32264 13


 


TOT INTRA-ABD COLECTOMY 45.8 10/19/07


 


TOTAL SPLENECTOMY 41.5 10/19/07


 


TREAT FINGER DISLOCATION 65583 07


 


TREATMENT OF ANKLE FRACTURE 30356 98


 


ULTRASONOGRAPHY OF RIGHT SUBCLAVIAN VEIN, GUIDANCE Q156ETF 16


 


VENOUS CATHETERIZATION NEC 38.93 06














Internal Medicine Assmt/Plan





- Assessment


Assessment: 





ASSESSMENT AND PLAN: agitation fecal


impaction, low albumin, schizoaffective disorder, leukocytosis, dementia,


hypothyroidism, asthma, degenerative joint disease.  


elevated ammonia pyuria











- Plan


Plan: 








- Plan


Plan: 





PLAN:   We will continue the


patient on oxygen and bronchodilator treatments.    We will review the patient's


chest x-ray and will review the patient's urine culture.  We will refer the


patient to Psychiatry as well, adjust the patient's psychotropic medication.  We


will continue monitoring the patient closely.  


add lactulose


on short course levaquin





Nutritional Asmnt/Malnutr-PDOC





- Dietary Evaluation


Malnutrition Findings (Please click <Entered> for more info): 








Nutritional Asmnt/Malnutrition                             Start:  19 19:

41


Text:                                                      Status: Active      

  


Freq:                                                                          

  


Protocol:                                                                      

  


 Document     19 19:41  FNS.D01  (Rec: 19 19:53  FNS.D01  [id number])


 Nutritional Asmnt/Malnutrition


     Patient General Information


      Nutritional Screening                      Moderate Risk


      Diagnosis                                  psychosis


      Subjective Information                     pt non-verbal, spoke with RN


                                                 who reports patient with good


                                                 appetite, is eating 100% of


                                                 meals, loves strawberry ice


                                                 cream and chicken.


      Current Diet Order/ Nutrition Support      Pureed


      Patient / S.O                              Not Indicated


      Pertinent Medications                      vitamin C, MOM, zinc sulfate,


                                                 theragran


      Pertinent Labs                             Labs reviewed.


     Nutritional Hx/Data


      Height                                     1.7 m


      Height (Calculated Centimeters)            170.2


      Current Weight (lbs)                       72.575 kg


      Weight (Calculated Kilograms)              72.6


      Weight (Calculated Grams)                  23348.8


      Ideal Body Weight                          61.4 kg, 135 lb


      % Ideal Body Weight                        119


      Body Mass Index (BMI)                      25.0


      Weight Status                              Overweight


     GI Symptoms


      GI Symptoms                                None


      Last BM                                     (soft, x 2)


      Difficult in:                              Chewing


      Food Allergies                             No


      Cultural/Ethnic/Hoahaoism Belief           unable to assess


      Usual diet at home                         unable to assess


      Skin Integrity/Comment:                    no pressure injury/non-healing


                                                 wounds


      Current %PO                                Good (%)


     Estimated Nutritional Goals


      BEE in Kcals:                              Using Current wt


      Calories/Kcals/Kg                          25-30


      Kcals Calculated                           1734-8027


      Protein:                                   Using Current wt


      Protein g/k-90


      Protein Calculated                         1-1.25


      Fluid: ml                                  7129-6382 (1 ml/kcal)


     Nutritional Problem


      1. Problem


       Problem                                   No nutrition diagnosis at this


                                                 time.


     Intervention/Recommendation


      Comments                                   suggest continue current diet


                                                 rx, pureed, per MD.


     Expected Outcomes/Goals


      Expected Outcomes/Goals                    Maintain PO intakes % of


                                                 meals.


                                                 Electronically Signed by:


                                                 Shira Alonso, MPH, RDN


                                                 Clinical Dietitian 19 7:


                                                 54 PM

## 2019-07-01 NOTE — PROGRESS NOTES
DATE:  06/30/2019



SUBJECTIVE:  Chart was reviewed and the patient interviewed.  Also discussed the

patient's condition with the staff and reviewed records and labs.  The patient

is still confused.  She also still rambling and talking to herself.  Also

thought processes are circumstantial and tangential with occasional flight of

ideas.



ASSESSMENT:  The patient is still agitated and psychotic.



TREATMENT PLAN:  Continue monitoring her behavior and her condition closely. 

Also, continue adjusting psychotropic medications and work on behavioral

modification.





DD: 06/30/2019 21:09

DT: 07/01/2019 01:09

JOB# [id number]  [id number]

## 2019-07-02 RX ADMIN — ALBUTEROL SULFATE SCH MG: 2 SYRUP ORAL at 09:15

## 2019-07-02 RX ADMIN — DIGOXIN SCH MG: 0.05 SOLUTION ORAL at 09:15

## 2019-07-02 RX ADMIN — LACTULOSE SCH GM: 20 SOLUTION ORAL at 09:15

## 2019-07-02 NOTE — INTERNAL MEDICINE PROG NOTE
Internal Medicine Subjective





- Subjective


Patient seen and examined:: with staff, chart reviewed


Patient is:: awake, verbal, non-interactive, agitated, confused


Per staff patient has:: no adverse event, no episodes of fall, poor appetite, 

agitated, combative





Internal Medicine Objective





- Results


Recent Labs: 


 Laboratory Last Values











Triglycerides  86 mg/dL (<150)   19  06:45    


 


Cholesterol  158 mg/dL (<200)   19  06:45    


 


LDL Cholesterol Direct  100 mg/dL ()   19  06:45    


 


HDL Cholesterol  38 mg/dL (23-92)   19  06:45    


 


Valproic Acid  68.5 ug/mL (50.0-100.0)   19  06:50    














- Physical Exam


Vitals and I&O: 


 Vital Signs











Temp  97.1 F   19 08:00


 


Pulse  63   19 09:15


 


Resp  18   19 08:00


 


BP  119/58   19 08:00


 


Pulse Ox  100   19 08:00








 Intake & Output











 19





 18:59 06:59 18:59


 


Intake Total 1600 200 


 


Balance 1600 200 


 


Intake:   


 


  Oral 1600 200 


 


Other:   


 


  # Voids 3 2 


 


  # Bowel Movements 0 0 











Active Medications: 


Current Medications





Acetaminophen (Tylenol 650mg Supp)  650 mg RC Q4H PRN


   PRN Reason: Mild Pain/Headache/T above 101


   Stop: 19 20:14


Al Hydrox/Mg Hydrox/Simethicone (Maalox)  30 ml PO Q6H PRN


   PRN Reason: Dyspepsia


   Stop: 19 20:14


Albuterol Sulfate (Ventolin)  2 mg PO BID [facility]


   Stop: 08/15/19 08:59


   Last Admin: 19 09:15 Dose:  2 mg


Ascorbic Acid (Vitamin C)  500 mg PO DAILY [facility]


   Stop: 08/15/19 08:59


   Last Admin: 19 09:15 Dose:  500 mg


Betamethasone Dipropionate (Betamethasone Dip 0.05% Cream)  1 appl TP DAILY [facility]


   Stop: 08/15/19 08:59


   Last Admin: 19 09:15 Dose:  1 appl


Clonazepam (Klonopin)  0.5 mg PO HS [facility]; Protocol


   Stop: 19 20:59


   Last Admin: 19 20:51 Dose:  0.5 mg


Clopidogrel Bisulfate (Plavix)  75 mg PO DAILY [facility]


   Stop: 08/15/19 08:59


   Last Admin: 19 09:15 Dose:  75 mg


Digoxin (Lanoxin)  0.125 mg PO DAILY [facility]


   Stop: 08/15/19 08:59


   Last Admin: 19 09:15 Dose:  0.125 mg


Divalproex Sodium (Depakote Er)  500 mg PO Q12HR [facility]; Protocol


   Stop: 08/15/19 08:59


   Last Admin: 19 09:16 Dose:  500 mg


Donepezil HCl (Aricept)  10 mg PO HS [facility]


   Stop: 19 20:59


   Last Admin: 19 20:51 Dose:  10 mg


Haloperidol Lactate (Haldol)  1 mg PO BID [facility]; Protocol


   PRN Reason: Agitation


   Stop: 08/15/19 08:59


   Last Admin: 19 16:26 Dose:  1 mg


Lactulose (Cephulac)  30 gm PO BID [facility]


   Stop: 08/15/19 08:59


   Last Admin: 19 09:15 Dose:  30 gm


Lorazepam (Ativan)  0.5 mg PO Q4HR PRN; Protocol


   PRN Reason: Anxiety/Agitation


   Stop: 07/15/19 20:14


   Last Admin: 19 21:50 Dose:  0.5 mg


Magnesium Hydroxide (Milk Of Magnesia)  30 ml PO HS PRN


   PRN Reason: Constipation


   Stop: 19 20:14


Midodrine (Proamatine)  5 mg PO Q12H [facility]


   Stop: 08/15/19 08:59


   Last Admin: 19 09:16 Dose:  5 mg


Multivitamins/Vitamin C (Theragran)  1 tab PO DAILY [facility]


   Stop: 08/15/19 08:59


   Last Admin: 19 09:16 Dose:  1 tab


Promethazine HCl/Dextromethorphan (Phenergan Dm 6.25/15mg-5 Ml)  5 ml PO TID PRN


   PRN Reason: Cough/ CONGESTION


   Stop: 08/15/19 01:44


Quetiapine Fumarate (Seroquel)  50 mg PO HS [facility]; Protocol


   Stop: 08/15/19 20:59


   Last Admin: 19 20:52 Dose:  50 mg


Quetiapine Fumarate (Seroquel)  25 mg PO BID [facility]; Protocol


   Stop: 19 08:59


   Last Admin: 19 09:16 Dose:  25 mg


Zinc Sulfate (Zinc Sulfate)  220 mg PO DAILY [facility]


   Stop: 08/15/19 08:59


   Last Admin: 19 09:16 Dose:  220 mg


Zolpidem Tartrate (Ambien)  5 mg PO HS PRN


   PRN Reason: Insomnia


   Stop: 19 20:14


   Last Admin: 19 20:53 Dose:  5 mg








General: demented


HEENT: NC/AT, PERRLA, EOMI


Neck: Supple, No JVD


Lungs: rales


Cardiovascular: RRR, Normal S1, Normal S2


Abdomen: soft, non-tender, non-distended, positive bowel sound


Extremities: excoriation


Neurological: no change





- Procedures


Procedures: 


 Procedures











Procedure Code Date


 


CARDIOPULM RESUSCITA NOS 99.60 10/19/07


 


CL REDUC DISLOC-HAND/FNG 79.74 07


 


CLOSURE SKIN & SUBCUTANEOUS NEC 86.59 09/10/12


 


CONTINUOUS INVASIVE MECHANICAL VENTILATION <96 CONSEC HRS 96.71 10/19/07


 


DESTRUCT EXT EAR LES NEC 18.29 01


 


DIPHTHERIA TOXOID ADMIN 99.36 12


 


EGD BIOPSY SINGLE/MULTIPLE 70679 06/16/10


 


EGD CONTROL BLEEDING ANY 98585 06


 


ELECTROCARDIOGRAM 89.52 01


 


ELECTROCARDIOGRAM COMPLETE 22270 01


 


EMERGENCY DEPT VISIT 75310 12


 


EMERGENCY DEPT VISIT 96996 12


 


ENDOSCOP EXC OR DEST OF LESION OR TISSUE OF ESOPHA 42.33 06


 


ESOPHAGOGASTRODUODENOSCOPY [EGD] W/CLOSED BIOPSY 45.16 06/16/10


 


EXC FACE-MM B9+ALBERTO 2.1-3 CM 61954 01


 


FREEING OF BOWEL ADHESION 85077 08


 


HEART/LUNG RESUSCITATION CPR 39702 10/19/07


 


HERNIA REPAIR W/MESH 12863 12


 


IMMUNIZATION ADMIN 12821 12


 


INJECT/INFUSE NEC 99.29 13


 


INSERT EMERGENCY AIRWAY 62734 10/19/07


 


INSERT ENDOTRACHEAL TUBE 96.04 10/19/07


 


INSERTION OF INFUSION DEV INTO R SUBCLAV VEIN, PERC APPROACH [id number] 16


 


INSJ PICC 5 YR+ W/O IMAGING 13413 16


 


OP RED-INT FIX TIB/FIBUL 79.36 98


 


OTH LYSIS-PERITONEAL ADHES 54.59 12


 


OTHER C.A.T. SCAN 88.38 98


 


OTHER OPEN INCISIONAL HERNIA REPAIR WITH GRAFT OR PROSTHESIS 53.61 12


 


PART SM BOWEL RESECT NEC 45.62 08


 


PERMANENT ILEOSTOMY NEC 46.23 10/19/07


 


RADIOGRAPHIC PROCEDURE 98325 98


 


REMOVAL OF COLON 37311 10/19/07


 


REMOVAL OF SPLEEN TOTAL 35590 10/19/07


 


REPAIR BOWEL OPENING 38753 08


 


RPR S/N/AX/GEN/TRNK 2.5CM/< 52463 12


 


RPR S/N/AX/GEN/TRNK2.6-7.5CM 99312 09/10/12


 


RPR VENTRAL ALLISON INIT REDUC 52588 12


 


SM BOWEL STOMA CLOSURE 46.51 08


 


TD VACCINE > 7 IM 86781 12


 


TETANUS TOXOID ADMINIST 99.38 12


 


THER/PROPH/DIAG INJ IV PUSH 14274 13


 


TOT INTRA-ABD COLECTOMY 45.8 10/19/07


 


TOTAL SPLENECTOMY 41.5 10/19/07


 


TREAT FINGER DISLOCATION 88932 07


 


TREATMENT OF ANKLE FRACTURE 05808 98


 


ULTRASONOGRAPHY OF RIGHT SUBCLAVIAN VEIN, GUIDANCE [id number] 16


 


VENOUS CATHETERIZATION NEC 38.93 06














Internal Medicine Assmt/Plan





- Assessment


Assessment: 





ASSESSMENT AND PLAN: agitation fecal


impaction, low albumin, schizoaffective disorder, leukocytosis, dementia,


hypothyroidism, asthma, degenerative joint disease.  


elevated ammonia pyuria











- Plan


Plan: 








- Plan


Plan: 





PLAN:   We will continue the


patient on oxygen and bronchodilator treatments.    We will review the patient's


chest x-ray and will review the patient's urine culture.  We will refer the


patient to Psychiatry as well, adjust the patient's psychotropic medication.  We


will continue monitoring the patient closely.  


add lactulose


on short course levaquin





Nutritional Asmnt/Malnutr-PDOC





- Dietary Evaluation


Malnutrition Findings (Please click <Entered> for more info): 








Nutritional Asmnt/Malnutrition                             Start:  19 19:

41


Text:                                                      Status: Active      

  


Freq:                                                                          

  


Protocol:                                                                      

  


 Document     19 19:41  FNS.D01  (Rec: 19 19:53  FNS.D01  VEENA-FNS1)


 Nutritional Asmnt/Malnutrition


     Patient General Information


      Nutritional Screening                      Moderate Risk


      Diagnosis                                  psychosis


      Subjective Information                     pt non-verbal, spoke with RN


                                                 who reports patient with good


                                                 appetite, is eating 100% of


                                                 meals, loves strawberry ice


                                                 cream and chicken.


      Current Diet Order/ Nutrition Support      Pureed


      Patient / S.O                              Not Indicated


      Pertinent Medications                      vitamin C, MOM, zinc sulfate,


                                                 theragran


      Pertinent Labs                             Labs reviewed.


     Nutritional Hx/Data


      Height                                     1.7 m


      Height (Calculated Centimeters)            170.2


      Current Weight (lbs)                       72.575 kg


      Weight (Calculated Kilograms)              72.6


      Weight (Calculated Grams)                  78059.8


      Ideal Body Weight                          61.4 kg, 135 lb


      % Ideal Body Weight                        119


      Body Mass Index (BMI)                      25.0


      Weight Status                              Overweight


     GI Symptoms


      GI Symptoms                                None


      Last BM                                     (soft, x 2)


      Difficult in:                              Chewing


      Food Allergies                             No


      Cultural/Ethnic/Advent Belief           unable to assess


      Usual diet at home                         unable to assess


      Skin Integrity/Comment:                    no pressure injury/non-healing


                                                 wounds


      Current %PO                                Good (%)


     Estimated Nutritional Goals


      BEE in Kcals:                              Using Current wt


      Calories/Kcals/Kg                          25-30


      Kcals Calculated                           6870-1491


      Protein:                                   Using Current wt


      Protein g/k-90


      Protein Calculated                         1-1.25


      Fluid: ml                                  1750-1701 (1 ml/kcal)


     Nutritional Problem


      1. Problem


       Problem                                   No nutrition diagnosis at this


                                                 time.


     Intervention/Recommendation


      Comments                                   suggest continue current diet


                                                 rx, pureed, per MD.


     Expected Outcomes/Goals


      Expected Outcomes/Goals                    Maintain PO intakes % of


                                                 meals.


                                                 Electronically Signed by:


                                                 Shira Alonso, MPH, RDN


                                                 Clinical Dietitian 19 7:


                                                 54 PM

## 2019-07-03 NOTE — DISCHARGE SUMMARY
DATE OF DISCHARGE:  07/02/2019



AGE:  73.



SEX:  Female.



PHYSICIAN:  Dr. Lopez.



FINAL DIAGNOSIS:



PRIMARY DIAGNOSIS:  Unspecified psychosis.



SECONDARY DIAGNOSIS:  Dementia, moderate to severe, but psychotic features.



REASON FOR HOSPITALIZATION:  The patient was admitted to the hospital because of

increased yelling and screaming in Med/Surg unit and the patient was transferred

to [facility].



HOSPITAL COURSE:  The patient continued to be in irritable mood and agitated. 

The patient was started on Haldol because the patient was refused to take any

medications, but she did take Haldol on a p.r.n. basis.  The patient also was

getting Seroquel and the dose adjusted to 25 mg twice a day and 50 mg at

bedtime.  Gradually, the patient's affect was brighter.  The patient was less

agitated and less irritable.  She also was interacting more.  The patient was

transferred back to the [facility].



Physical exam of the patient showed no major medical problems.  Also, the

patient has no significant abnormal labs.



AFTER DISCHARGE PLANS:  The patient was discharged from the hospital back to

[facility] with plans to follow up there.



EXPECTED OUTCOME AFTER DISCHARGE:  Fair if the patient continues to take her

psychotropic medication and follow up with discharge plans.





DD: 07/03/2019 18:54

DT: 07/03/2019 19:18

[facility]# [id number]  [id number]

## 2019-07-05 NOTE — PROGRESS NOTES
DATE:  07/01/2019



DATE OF SERVICE:  07/01/2019



SUBJECTIVE:  Chart reviewed and the patient interviewed.  Also, discussed the

patient's condition with the staff and reviewed records and labs.  The patient

is still guarded and anxious.  The patient also is still in depressed mood.  The

patient also is still having mood swings and is still having difficulty with

following directions.  On the other hand, the patient seems to be less agitated

and less irritable and slightly easier to redirect her.  The patient also has

been compliant with taking her medications with no side effects of medications.



ASSESSMENT:  The patient is still agitated and needs redirections.



TREATMENT PLAN:  Continue to monitor behavior and condition closely.  Also,

continue to work on discharge plans and placement issue.





DD: 07/04/2019 12:38

DT: 07/04/2019 20:38

JOB# [id number]  [id number]

## 2019-07-05 NOTE — PROGRESS NOTES
DATE:  07/02/2019



DATE OF SERVICE:  07/02/2019



SUBJECTIVE:  Chart reviewed and the patient interviewed.  Also, discussed the

patient's condition with the staff and reviewed records and labs.  The patient

is still anxious and is still depressed.  The patient also does still have

difficulty following directions, but the patient seems to be less paranoid and

less agitated.  The patient also is interacting more with peers and is compliant

with taking her medications.



ASSESSMENT:  The patient is still psychotic, but less agitated and less

irritable and compliant with taking her medications.



TREATMENT PLAN:  We will continue monitoring her behavior closely.  Also,

continue to work on her compliance with taking her medications and will continue

to follow up.





DD: 07/04/2019 12:40

DT: 07/04/2019 20:36

[facility]# [id number]  [id number]

## 2019-07-15 NOTE — CONSULTATION
Covering for Dr. Lopez.



Case was discussed with staff of the patient, reviewed records.



IDENTIFYING INFORMATION:  The patient is a 71-year-old female.



REASON FOR CONSULTATION:  I was asked to see the patient who was admitted.  She

is on many psychotropic medications, Seroquel, Risperdal, Haldol, Zyprexa as

needed.



HISTORY OF PRESENT ILLNESS:  Apparently when she came, she had urinary tract

infection.  She was very agitated.  When I talked to her, she was a poor

historian, unable to participate in any meaningful conversation.  The staff

reports the patient was very agitated this morning, but since then, she has been

sleepy, would not answer any questions.  The patient is a very poor historian.



PAST PSYCHIATRIC HISTORY:  The patient has a history of psychiatric illness, the

nature of which I was unable to get because she was unable to participate in a

meaningful conversation, so I could not tell if she has any prior suicide

attempt, why she is taking all of these medications, who prescribed them.



MEDICAL HISTORY:  Deferred to the medical doctor.  The patient has infection and

pain.  She has urinary tract infection.  She is on antibiotic.



FAMILY HISTORY:  Unobtainable.



SOCIAL HISTORY:  Unobtainable.



MENTAL STATUS EXAMINATION:  The patient is appropriately dressed, not very well

groomed.  She was very agitated, combative earlier.  Apparently, she had to be

medicated with Haldol.  Unable to participate in meaningful conversation or make

safe plan for her self-care.  She is confused.  Long and short term memory poor.

 Insight and judgment are impaired.  Unable to answer questions regarding

suicide or homicide or hallucinations.



IMPRESSION:

AXIS I:  Psychosis, not otherwise specified.



RECOMMENDATIONS:  I would recommend to take the patient off Seroquel and

Zyprexa.  Keep her on Risperdal 1 mg twice a day, Haldol on an as-needed basis,

this could be given IM.  Continue with the Depakote.  The patient needs follow

up with the psychiatrist upon discharge.



Thank you very much for allowing me to participate in the care of this most

interesting lady.





DD: 02/28/2017 12:27

DT: 03/01/2017 01:04

JOB# [id number]  [id number] Complex Repair And Graft Additional Text (Will Appearing After The Standard Complex Repair Text): The complex repair was not sufficient to completely close the primary defect. The remaining additional defect was repaired with the graft mentioned below.